# Patient Record
Sex: FEMALE | Race: WHITE | NOT HISPANIC OR LATINO | Employment: FULL TIME | URBAN - METROPOLITAN AREA
[De-identification: names, ages, dates, MRNs, and addresses within clinical notes are randomized per-mention and may not be internally consistent; named-entity substitution may affect disease eponyms.]

---

## 2017-02-21 RX ORDER — GLIPIZIDE 5 MG/1
TABLET, EXTENDED RELEASE ORAL
Qty: 30 TAB | Refills: 5 | Status: SHIPPED | OUTPATIENT
Start: 2017-02-21 | End: 2017-10-30 | Stop reason: SDUPTHER

## 2017-02-21 NOTE — TELEPHONE ENCOUNTER
Was the patient seen in the last year in this department? No 12/15/15    Does patient have an active prescription for medications requested? No     Received Request Via: Pharmacy

## 2017-05-22 RX ORDER — HYDROCORTISONE 20 MG/1
TABLET ORAL
Qty: 60 TAB | Refills: 0 | Status: SHIPPED | OUTPATIENT
Start: 2017-05-22 | End: 2017-07-10 | Stop reason: SDUPTHER

## 2017-05-31 ENCOUNTER — OFFICE VISIT (OUTPATIENT)
Dept: MEDICAL GROUP | Facility: CLINIC | Age: 37
End: 2017-05-31
Payer: COMMERCIAL

## 2017-05-31 ENCOUNTER — HOSPITAL ENCOUNTER (OUTPATIENT)
Dept: LAB | Facility: MEDICAL CENTER | Age: 37
End: 2017-05-31
Attending: INTERNAL MEDICINE
Payer: COMMERCIAL

## 2017-05-31 VITALS
SYSTOLIC BLOOD PRESSURE: 120 MMHG | TEMPERATURE: 96.5 F | BODY MASS INDEX: 25.1 KG/M2 | HEIGHT: 64 IN | HEART RATE: 74 BPM | RESPIRATION RATE: 14 BRPM | OXYGEN SATURATION: 100 % | WEIGHT: 147 LBS | DIASTOLIC BLOOD PRESSURE: 70 MMHG

## 2017-05-31 DIAGNOSIS — E05.00 THYROTOXICOSIS WITH DIFFUSE GOITER AND WITHOUT THYROID STORM: ICD-10-CM

## 2017-05-31 DIAGNOSIS — E27.40 ADRENAL INSUFFICIENCY (HCC): ICD-10-CM

## 2017-05-31 DIAGNOSIS — E11.9 TYPE 2 DIABETES MELLITUS WITHOUT COMPLICATION, WITHOUT LONG-TERM CURRENT USE OF INSULIN (HCC): ICD-10-CM

## 2017-05-31 DIAGNOSIS — L70.0 ACNE VULGARIS: ICD-10-CM

## 2017-05-31 LAB
HBA1C MFR BLD: 6.4 % (ref ?–5.8)
INT CON NEG: NORMAL
INT CON POS: NORMAL
T3FREE SERPL-MCNC: 3.85 PG/ML (ref 2.4–4.2)
T4 FREE SERPL-MCNC: 1.11 NG/DL (ref 0.53–1.43)
TSH SERPL DL<=0.005 MIU/L-ACNC: 0.58 UIU/ML (ref 0.3–3.7)

## 2017-05-31 PROCEDURE — 36415 COLL VENOUS BLD VENIPUNCTURE: CPT

## 2017-05-31 PROCEDURE — 84481 FREE ASSAY (FT-3): CPT

## 2017-05-31 PROCEDURE — 84443 ASSAY THYROID STIM HORMONE: CPT

## 2017-05-31 PROCEDURE — 84439 ASSAY OF FREE THYROXINE: CPT

## 2017-05-31 PROCEDURE — 99214 OFFICE O/P EST MOD 30 MIN: CPT | Performed by: NURSE PRACTITIONER

## 2017-05-31 PROCEDURE — 92250 FUNDUS PHOTOGRAPHY W/I&R: CPT | Mod: TC | Performed by: NURSE PRACTITIONER

## 2017-05-31 PROCEDURE — 83036 HEMOGLOBIN GLYCOSYLATED A1C: CPT | Performed by: NURSE PRACTITIONER

## 2017-05-31 RX ORDER — MINOCYCLINE HYDROCHLORIDE 100 MG/1
100 TABLET ORAL 2 TIMES DAILY
Qty: 60 TAB | Refills: 11 | Status: SHIPPED | OUTPATIENT
Start: 2017-05-31 | End: 2018-06-16 | Stop reason: SDUPTHER

## 2017-05-31 RX ORDER — MINOCYCLINE HYDROCHLORIDE 50 MG/1
50 TABLET ORAL 2 TIMES DAILY
Qty: 60 TAB | Refills: 11 | Status: SHIPPED | OUTPATIENT
Start: 2017-05-31 | End: 2017-05-31

## 2017-05-31 ASSESSMENT — PATIENT HEALTH QUESTIONNAIRE - PHQ9: CLINICAL INTERPRETATION OF PHQ2 SCORE: 0

## 2017-05-31 NOTE — PROGRESS NOTES
CC: Medication Refill        HPI:     Gala presents today for the followin. Acne vulgaris  Here requesting refills of her minocycline for acne. Would like to know she can increase her dose that she does not feel it is helpful as has been previously for acne. She does not get any side effects no GI upset with this.    2. Type 2 diabetes mellitus without complication, without long-term current use of insulin (CMS-HCC)  Related to a history of for adrenal insufficiency and steroid use. Currently is taking glipizide 5 mg once daily at night. She does occasionally check her blood sugar at home and has not had any high levels. No reports of hypoglycemia. Has not had her blood sugar checked in a year    3. Adrenal insufficiency (CMS-HCC)  Followed by endocrinology for this as well as her hyperthyroidism. Has an appointment next week. Steroids daily.    Current Outpatient Prescriptions   Medication Sig Dispense Refill   • minocycline (DYNACIN) 100 MG tablet Take 1 Tab by mouth 2 times a day. 60 Tab 11   • hydrocortisone (CORTEF) 20 MG Tab TAKE ONE TABLET BY MOUTH TWICE A DAY 60 Tab 0   • GLIPIZIDE XL 5 MG TABLET SR 24 HR TAKE ONE TABLET BY MOUTH DAILY 30 Tab 5   • methimazole (TAPAZOLE) 10 MG Tab TAKE ONE AND ONE-HALF (1 & 1/2) TABLET BY MOUTH DAILY 60 Tab 3   • omeprazole (PRILOSEC) 40 MG delayed-release capsule Take 40 mg by mouth 2 times a day.     • sumatriptan (IMITREX) 50 MG Tab TAKE ONE TABLET BY MOUTH ONE TIME AS NEEDED FOR MIGRAINE. MAY REPEAT IN 2 HOURS IF HEADACHE PERSISTS. MAX 2 TABLETS IN 24 HOURS 10 Tab 5   • Ibuprofen (ADVIL) 200 MG CAPS Take 4 Caps by mouth as needed. Indications: Mild to Moderate Pain       No current facility-administered medications for this visit.     Social History   Substance Use Topics   • Smoking status: Former Smoker -- 1.00 packs/day for 13 years     Types: Cigarettes     Quit date: 2006   • Smokeless tobacco: Never Used   • Alcohol Use: No     I reviewed patients  "allergies, problem list and medications today in [facility].    ROS: Any/all pertinent positives listed in the HPI, otherwise all others reviewed are negative today.      /70 mmHg  Pulse 74  Temp(Src) 35.8 °C (96.5 °F)  Resp 14  Ht 1.626 m (5' 4\")  Wt 66.679 kg (147 lb)  BMI 25.22 kg/m2  SpO2 100%    Exam:   Gen: Alert and oriented, No apparent distress. WDWN  Psych: A+Ox3, normal affect and mood  Skin: Warm, dry and intact. Good turgor   No rashes in visible areas.  Eye: Conjunctiva clear, lids normal  ENMT: Lips without lesions, good dentition  Lungs: Clear to auscultation bilaterally, no rales or rhonchi   Unlabored respiratory effort.   CV: Regular rate and rhythm, S1, S2. No murmurs.   No Edema    Monofilament testing with a 10 gram force: sensation intact: intact bilaterally  Visual Inspection: Feet without maceration, ulcers, fissures.  Pedal pulses: intact bilaterally         Point-of-care A1c: 6.4    Assessment and Plan.   37 y.o. female with the following issues.    1. Acne vulgaris  Stable. Increased to 100 mg twice a day. She'll notify mouth she has any adverse effects or stomach upset with this. Recommend probiotic    2. Type 2 diabetes mellitus without complication, without long-term current use of insulin (CMS-HCC)  Stable.  Continue current medication labs ordered as below. Retinal scan today  - POCT  A1C  - COMP METABOLIC PANEL; Future  - LIPID PROFILE; Future  - MICROALBUMIN CREAT RATIO URINE; Future  - Diabetic Monofilament LE Exam  - POCT Retinal Eye Exam    3. Adrenal insufficiency (CMS-[facility])  Stable. Continue follow-up with endocrinology        "

## 2017-05-31 NOTE — MR AVS SNAPSHOT
"        Gala Camarena   2017 11:40 AM   Office Visit   MRN: [id number]    Department:  [facility]   Dept Phone:  [phone]    Description:  Female : 1980   Provider:  SHANNA Wagoner           Reason for Visit     Medication Refill dynacin      Allergies as of 2017     Allergen Noted Reactions    Ativan 2015       Hallucinations, restless    Compazine 2014   Palpitations    Hypertension    Prochlorperazine 2014   Palpitations    \"Compazine\"; severe HTN    Tape 2014   Rash    Paper tape is okay.      You were diagnosed with     Acne vulgaris   [172882]       Type 2 diabetes mellitus without complication, without long-term current use of insulin (CMS-HCC)   [7727133]       Adrenal insufficiency (CMS-HCC)   [471143]         Vital Signs     Blood Pressure Pulse Temperature Respirations Height Weight    120/70 mmHg 74 35.8 °C (96.5 °F) 14 1.626 m (5' 4\") 66.679 kg (147 lb)    Body Mass Index Oxygen Saturation Smoking Status             25.22 kg/m2 100% Former Smoker         Basic Information     Date Of Birth Sex Race Ethnicity Preferred Language    1980 Female White Non- English      Your appointments     2017 11:20 AM   Established Patient with Sebastian Soler M.D.   [facility]    [address] [place] [address], [address]  [facility] [address] [phone]           You will be receiving a confirmation call a few days before your appointment from our automated call confirmation system.              Problem List              ICD-10-CM Priority Class Noted - Resolved    S/P colectomy Z90.49 Low  2014 - Present    Ulcerative colitis (CMS-HCC) K51.90 Medium  2014 - Present    Mixed anxiety and depressive disorder F41.8 Low  2014 - Present    Adrenal insufficiency (CMS-HCC) E27.40 Medium  2014 - Present    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CMS-HCC) C90.00 High  2014 - Present    IgA " gammopathy D47.2 Low  Unknown - Present    Thyrotoxicosis E05.90 Low  11/6/2014 - Present    Headache, classical migraine G43.109 Low  11/1/2014 - Present    Chronic abdominal pain R10.9, G89.29 Low  3/6/2015 - Present    Ulcerative colitis (CMS-HCC) K51.90 Low  5/20/2015 - Present    Type 2 diabetes mellitus E11.9 Medium  12/14/2015 - Present    Acne vulgaris L70.0 Low  12/15/2015 - Present    IUD (intrauterine device) in place-mirena 8/2013 Z97.5 Low  6/15/2016 - Present      Health Maintenance        Date Due Completion Dates    IMM HEP B VACCINE (1 of 3 - Primary Series) 1980 ---    DIABETES MONOFILAMENT / LE EXAM 1980 ---    RETINAL SCREENING 5/25/1997 ---    IMM DTaP/Tdap/Td Vaccine (1 - Tdap) 5/25/1998 ---    IMM PNEUMOCOCCAL 19-64 (ADULT) HIGHEST RISK SERIES (1 of 3 - PCV13) 5/25/1998 ---    A1C SCREENING 8/13/2016 2/13/2016, 4/27/2015, 12/8/2014, 9/19/2014    FASTING LIPID PROFILE 2/13/2017 2/13/2016, 12/8/2014    URINE ACR / MICROALBUMIN 2/13/2017 2/13/2016    SERUM CREATININE 2/13/2017 2/13/2016, 5/30/2015, 5/29/2015, 5/27/2015, 5/24/2015, 5/23/2015, 5/22/2015, 5/21/2015, 5/8/2015, 4/27/2015, 4/27/2015, 12/29/2014, 12/8/2014, 11/26/2014, 10/29/2014, 10/2/2014, 9/19/2014, 9/4/2014, 9/1/2014, 8/30/2014, 8/29/2014, 8/28/2014, 8/27/2014, 8/26/2014, 8/25/2014, 8/24/2014, 8/23/2014, 8/22/2014, 8/21/2014, 8/21/2014, 8/21/2014, 8/18/2014, 8/13/2014, 8/12/2014, 8/11/2014, 7/21/2014, 6/23/2014, 6/23/2014, 6/22/2014, 6/21/2014, 6/21/2014, 6/20/2014, 6/19/2014, 6/19/2014, 6/18/2014, 6/17/2014, 6/17/2014, 6/17/2014, 6/15/2014, 6/14/2014, 6/13/2014, 6/11/2014, 6/10/2014, 6/8/2014, 6/7/2014, 6/6/2014, 6/5/2014, 6/4/2014, 6/3/2014, 5/31/2014, 5/29/2014, 5/28/2014, 5/26/2014, 5/22/2014, 5/20/2014, 5/19/2014, 5/18/2014, 5/17/2014, 5/16/2014, 5/16/2014, 5/15/2014, 5/14/2014, 5/13/2014, 5/12/2014, 5/10/2014, 5/9/2014, 5/2/2014, 4/28/2014, 4/12/2014, 4/11/2014, 4/10/2014, 4/9/2014, 4/8/2014, 4/7/2014, 4/6/2014,  4/5/2014, 4/4/2014, 3/31/2014, 3/30/2014, 3/29/2014, 3/27/2014, 3/26/2014, 3/25/2014, 3/24/2014, 3/23/2014, 3/22/2014, 3/21/2014, 3/20/2014, 3/19/2014, 3/18/2014, 3/17/2014, 3/16/2014, 3/15/2014, 3/14/2014, 3/13/2014, 3/12/2014, 3/11/2014, 3/10/2014, 3/9/2014, 3/8/2014, 3/7/2014, 3/6/2014, 3/6/2014, 3/5/2014, 3/3/2014, 3/2/2014, 2/27/2014, 2/26/2014, 2/25/2014, 2/22/2014, 2/21/2014, 2/20/2014, 2/15/2014    PAP SMEAR 5/14/2017 5/14/2014 (Done)    Override on 5/14/2014: Done            Results     POCT  A1C      Component    Glycohemoglobin    6.4    Internal Control Negative    Internal Control Positive                        Current Immunizations     Influenza TIV (IM) 2/24/2014    Influenza Vaccine Quad Inj (Preserved) 12/19/2014    Tuberculin Skin Test  Incomplete      Below and/or attached are the medications your provider expects you to take. Review all of your home medications and newly ordered medications with your provider and/or pharmacist. Follow medication instructions as directed by your provider and/or pharmacist. Please keep your medication list with you and share with your provider. Update the information when medications are discontinued, doses are changed, or new medications (including over-the-counter products) are added; and carry medication information at all times in the event of emergency situations     Allergies:  ATIVAN - (reactions not documented)     COMPAZINE - Palpitations     PROCHLORPERAZINE - Palpitations     TAPE - Rash               Medications  Valid as of: May 31, 2017 - 12:09 PM    Generic Name Brand Name Tablet Size Instructions for use    GlipiZIDE (TABLET SR 24 HR) GLIPIZIDE XL 5 MG TAKE ONE TABLET BY MOUTH DAILY        Hydrocortisone (Tab) CORTEF 20 MG TAKE ONE TABLET BY MOUTH TWICE A DAY        Ibuprofen (Cap) Ibuprofen 200 MG Take 4 Caps by mouth as needed. Indications: Mild to Moderate Pain        MethIMAzole (Tab) TAPAZOLE 10 MG TAKE ONE AND ONE-HALF (1 & 1/2) TABLET BY  MOUTH DAILY        Minocycline HCl (Tab) DYNACIN 100 MG Take 1 Tab by mouth 2 times a day.        Omeprazole (CAPSULE DELAYED RELEASE) PRILOSEC 40 MG Take 40 mg by mouth 2 times a day.        SUMAtriptan Succinate (Tab) IMITREX 50 MG TAKE ONE TABLET BY MOUTH ONE TIME AS NEEDED FOR MIGRAINE. MAY REPEAT IN 2 HOURS IF HEADACHE PERSISTS. MAX 2 TABLETS IN 24 HOURS        .                 Medicines prescribed today were sent to:     [facility] #[id number] - [address] AT [address] [address]    Phone: [phone] Fax: [phone]    Open 24 Hours?: No      Medication refill instructions:       If your prescription bottle indicates you have medication refills left, it is not necessary to call your provider’s office. Please contact your pharmacy and they will refill your medication.    If your prescription bottle indicates you do not have any refills left, you may request refills at any time through one of the following ways: The online iCrumz system (except Urgent Care), by calling your provider’s office, or by asking your pharmacy to contact your provider’s office with a refill request. Medication refills are processed only during regular business hours and may not be available until the next business day. Your provider may request additional information or to have a follow-up visit with you prior to refilling your medication.   *Please Note: Medication refills are assigned a new Rx number when refilled electronically. Your pharmacy may indicate that no refills were authorized even though a new prescription for the same medication is available at the pharmacy. Please request the medicine by name with the pharmacy before contacting your provider for a refill.        Your To Do List     Future Labs/Procedures Complete By Expires    COMP METABOLIC PANEL  As directed 6/1/2018    LIPID PROFILE  As directed 6/1/2018    MICROALBUMIN CREAT RATIO URINE  As directed 6/1/2018          [employer] Access Code: Activation code not generated  Current [employer] Status: Active

## 2017-06-06 ENCOUNTER — OFFICE VISIT (OUTPATIENT)
Dept: ENDOCRINOLOGY | Facility: MEDICAL CENTER | Age: 37
End: 2017-06-06
Payer: COMMERCIAL

## 2017-06-06 VITALS
HEART RATE: 88 BPM | DIASTOLIC BLOOD PRESSURE: 66 MMHG | BODY MASS INDEX: 24.41 KG/M2 | SYSTOLIC BLOOD PRESSURE: 104 MMHG | WEIGHT: 143 LBS | HEIGHT: 64 IN | OXYGEN SATURATION: 99 %

## 2017-06-06 DIAGNOSIS — E05.80 OTHER THYROTOXICOSIS: Primary | ICD-10-CM

## 2017-06-06 DIAGNOSIS — E27.40 ADRENAL INSUFFICIENCY (HCC): ICD-10-CM

## 2017-06-06 PROCEDURE — 99214 OFFICE O/P EST MOD 30 MIN: CPT | Performed by: INTERNAL MEDICINE

## 2017-06-06 NOTE — MR AVS SNAPSHOT
"        Gala Camarena   2017 11:20 AM   Office Visit   MRN: [id number]    Department:  Endocrinology [facility]   Dept Phone:  [phone]    Description:  Female : 1980   Provider:  Sebastian Soler M.D.           Allergies as of 2017     Allergen Noted Reactions    Ativan 2015       Hallucinations, restless    Compazine 2014   Palpitations    Hypertension    Prochlorperazine 2014   Palpitations    \"Compazine\"; severe HTN    Tape 2014   Rash    Paper tape is okay.      You were diagnosed with     Other thyrotoxicosis   [0928914]  -  Primary     Adrenal insufficiency (CMS-HCC)   [375667]         Vital Signs     Blood Pressure Pulse Height Weight Body Mass Index Oxygen Saturation    104/66 mmHg 88 1.626 m (5' 4.02\") 64.864 kg (143 lb) 24.53 kg/m2 99%    Smoking Status                   Former Smoker           Basic Information     Date Of Birth Sex Race Ethnicity Preferred Language    1980 Female White Non- English      Your appointments     2017 10:40 AM   Established Patient with Sebastian Soler M.D.   [facility]    [address] [facility], [address]  [facility] [address] [phone]           You will be receiving a confirmation call a few days before your appointment from our automated call confirmation system.              Problem List              ICD-10-CM Priority Class Noted - Resolved    S/P colectomy Z90.49 Low  2014 - Present    Ulcerative colitis (CMS-[facility]) K51.90 Medium  2014 - Present    Mixed anxiety and depressive disorder F41.8 Low  2014 - Present    Adrenal insufficiency (CMS-[facility]) E27.40 Medium  2014 - Present    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CMS-[facility]) C90.00 High  2014 - Present    IgA gammopathy D47.2 Low  Unknown - Present    Thyrotoxicosis E05.90 Low  2014 - Present    Headache, classical migraine G43.109 Low  2014 - Present    Chronic abdominal pain R10.9, G89.29 " Low  3/6/2015 - Present    Ulcerative colitis (CMS-HCC) K51.90 Low  5/20/2015 - Present    Type 2 diabetes mellitus E11.9 Medium  12/14/2015 - Present    Acne vulgaris L70.0 Low  12/15/2015 - Present    IUD (intrauterine device) in place-mirena 8/2013 Z97.5 Low  6/15/2016 - Present      Health Maintenance        Date Due Completion Dates    IMM HEP B VACCINE (1 of 3 - Primary Series) 1980 ---    IMM DTaP/Tdap/Td Vaccine (1 - Tdap) 5/25/1998 ---    IMM PNEUMOCOCCAL 19-64 (ADULT) HIGHEST RISK SERIES (1 of 3 - PCV13) 5/25/1998 ---    FASTING LIPID PROFILE 2/13/2017 2/13/2016, 12/8/2014    URINE ACR / MICROALBUMIN 2/13/2017 2/13/2016    SERUM CREATININE 2/13/2017 2/13/2016, 5/30/2015, 5/29/2015, 5/27/2015, 5/24/2015, 5/23/2015, 5/22/2015, 5/21/2015, 5/8/2015, 4/27/2015, 4/27/2015, 12/29/2014, 12/8/2014, 11/26/2014, 10/29/2014, 10/2/2014, 9/19/2014, 9/4/2014, 9/1/2014, 8/30/2014, 8/29/2014, 8/28/2014, 8/27/2014, 8/26/2014, 8/25/2014, 8/24/2014, 8/23/2014, 8/22/2014, 8/21/2014, 8/21/2014, 8/21/2014, 8/18/2014, 8/13/2014, 8/12/2014, 8/11/2014, 7/21/2014, 6/23/2014, 6/23/2014, 6/22/2014, 6/21/2014, 6/21/2014, 6/20/2014, 6/19/2014, 6/19/2014, 6/18/2014, 6/17/2014, 6/17/2014, 6/17/2014, 6/15/2014, 6/14/2014, 6/13/2014, 6/11/2014, 6/10/2014, 6/8/2014, 6/7/2014, 6/6/2014, 6/5/2014, 6/4/2014, 6/3/2014, 5/31/2014, 5/29/2014, 5/28/2014, 5/26/2014, 5/22/2014, 5/20/2014, 5/19/2014, 5/18/2014, 5/17/2014, 5/16/2014, 5/16/2014, 5/15/2014, 5/14/2014, 5/13/2014, 5/12/2014, 5/10/2014, 5/9/2014, 5/2/2014, 4/28/2014, 4/12/2014, 4/11/2014, 4/10/2014, 4/9/2014, 4/8/2014, 4/7/2014, 4/6/2014, 4/5/2014, 4/4/2014, 3/31/2014, 3/30/2014, 3/29/2014, 3/27/2014, 3/26/2014, 3/25/2014, 3/24/2014, 3/23/2014, 3/22/2014, 3/21/2014, 3/20/2014, 3/19/2014, 3/18/2014, 3/17/2014, 3/16/2014, 3/15/2014, 3/14/2014, 3/13/2014, 3/12/2014, 3/11/2014, 3/10/2014, 3/9/2014, 3/8/2014, 3/7/2014, 3/6/2014, 3/6/2014, 3/5/2014, 3/3/2014, 3/2/2014, 2/27/2014, 2/26/2014,  2/25/2014, 2/22/2014, 2/21/2014, 2/20/2014, 2/15/2014    PAP SMEAR 5/14/2017 5/14/2014 (Done)    Override on 5/14/2014: Done    A1C SCREENING 11/30/2017 5/31/2017, 2/13/2016, 4/27/2015, 12/8/2014, 9/19/2014    RETINAL SCREENING 5/31/2018 5/31/2017    DIABETES MONOFILAMENT / LE EXAM 5/31/2018 5/31/2017            Current Immunizations     Influenza TIV (IM) 2/24/2014    Influenza Vaccine Quad Inj (Preserved) 12/19/2014    Tuberculin Skin Test  Incomplete      Below and/or attached are the medications your provider expects you to take. Review all of your home medications and newly ordered medications with your provider and/or pharmacist. Follow medication instructions as directed by your provider and/or pharmacist. Please keep your medication list with you and share with your provider. Update the information when medications are discontinued, doses are changed, or new medications (including over-the-counter products) are added; and carry medication information at all times in the event of emergency situations     Allergies:  ATIVAN - (reactions not documented)     COMPAZINE - Palpitations     PROCHLORPERAZINE - Palpitations     TAPE - Rash               Medications  Valid as of: June 06, 2017 - 12:00 PM    Generic Name Brand Name Tablet Size Instructions for use    GlipiZIDE (TABLET SR 24 HR) GLIPIZIDE XL 5 MG TAKE ONE TABLET BY MOUTH DAILY        Hydrocortisone (Tab) CORTEF 20 MG TAKE ONE TABLET BY MOUTH TWICE A DAY        Ibuprofen (Cap) Ibuprofen 200 MG Take 4 Caps by mouth as needed. Indications: Mild to Moderate Pain        MethIMAzole (Tab) TAPAZOLE 10 MG TAKE ONE AND ONE-HALF (1 & 1/2) TABLET BY MOUTH DAILY        Minocycline HCl (Tab) DYNACIN 100 MG Take 1 Tab by mouth 2 times a day.        Omeprazole (CAPSULE DELAYED RELEASE) PRILOSEC 40 MG Take 40 mg by mouth 2 times a day.        SUMAtriptan Succinate (Tab) IMITREX 50 MG TAKE ONE TABLET BY MOUTH ONE TIME AS NEEDED FOR MIGRAINE. MAY REPEAT IN 2 HOURS IF  HEADACHE PERSISTS. MAX 2 TABLETS IN 24 HOURS        .                 Medicines prescribed today were sent to:     [facility] #[id number] - [place], [place] - [address] [facility] AT [address] [place] [address]    Phone: [phone] Fax: [phone]    Open 24 Hours?: No      Medication refill instructions:       If your prescription bottle indicates you have medication refills left, it is not necessary to call your provider’s office. Please contact your pharmacy and they will refill your medication.    If your prescription bottle indicates you do not have any refills left, you may request refills at any time through one of the following ways: The online [employer] system (except Urgent Care), by calling your provider’s office, or by asking your pharmacy to contact your provider’s office with a refill request. Medication refills are processed only during regular business hours and may not be available until the next business day. Your provider may request additional information or to have a follow-up visit with you prior to refilling your medication.   *Please Note: Medication refills are assigned a new Rx number when refilled electronically. Your pharmacy may indicate that no refills were authorized even though a new prescription for the same medication is available at the pharmacy. Please request the medicine by name with the pharmacy before contacting your provider for a refill.        Your To Do List     Future Labs/Procedures Complete By Expires    ACTH  As directed 12/7/2017    BASIC METABOLIC PANEL  As directed 6/6/2018    CORTISOL  As directed 12/7/2017    FREE THYROXINE  As directed 12/7/2017    T3 FREE  As directed 12/7/2017    THYROID PEROXIDASE  (TPO) AB  As directed 12/7/2017    TSH  As directed 12/7/2017    US-SOFT TISSUES OF HEAD - NECK  As directed 12/5/2017         Peerform Access Code: Activation code not generated  Current Peerform Status: Active           33.5 51

## 2017-06-07 NOTE — PROGRESS NOTES
"Chief Complaint   Patient presents with   • Thyrotoxicosis        HPI:        1. Thyrotoxicosis.    The patient is doing very well and is clinically euthyroid taking methimazole 10 mg per day.  Her TSH is normal at 0.5 and free T4 is mid range at 1.1 and free T3 upper normal at 3.8.  She does not have the Grave’s antibody.  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  I am going to do an ultrasound to define the anatomy.  Also we will get TPO antibodies to see if she might have Charley toxicosis.  I think it is too soon to change her dose of methimazole.  She will stay on 10 mg and recheck in one month.    2. Adrenal insufficiency.    This is a diagnosis that came out of the hospital without much of a work up.  However she was quite ill and did well taking hydrocortisone.  We haven’t wanted to take her off too quickly.  The 21-hydroxylase antibody is negative.  In the meantime, I would like to lower her dose a little bit to see if we can gradually wean off or tell us that we shouldn’t so she will go down to 10 mg AM and 5 mg PM.  She does have a Medic Alert bracelet.  She does know that she is to increase her cortisone double or triple at the time of illness or injury.      I will review that also again in one month.  We will get a morning ACTH and cortisol level before she takes her morning dose of cortisone.      ROS:  All other systems reported as negative or unchanged since last exam      Allergies:   Allergies   Allergen Reactions   • Ativan      Hallucinations, restless   • Compazine Palpitations     Hypertension   • Prochlorperazine Palpitations     \"Compazine\"; severe HTN   • Tape Rash     Paper tape is okay.       Current medicines including changes today:  Current Outpatient Prescriptions   Medication Sig Dispense Refill   • minocycline (DYNACIN) 100 MG tablet Take 1 Tab by mouth 2 times a day. 60 Tab 11   • hydrocortisone (CORTEF) 20 MG Tab TAKE ONE TABLET BY " "MOUTH TWICE A DAY 60 Tab 0   • GLIPIZIDE XL 5 MG TABLET SR 24 HR TAKE ONE TABLET BY MOUTH DAILY 30 Tab 5   • methimazole (TAPAZOLE) 10 MG Tab TAKE ONE AND ONE-HALF (1 & 1/2) TABLET BY MOUTH DAILY 60 Tab 3   • omeprazole (PRILOSEC) 40 MG delayed-release capsule Take 40 mg by mouth 2 times a day.     • sumatriptan (IMITREX) 50 MG Tab TAKE ONE TABLET BY MOUTH ONE TIME AS NEEDED FOR MIGRAINE. MAY REPEAT IN 2 HOURS IF HEADACHE PERSISTS. MAX 2 TABLETS IN 24 HOURS 10 Tab 5   • Ibuprofen (ADVIL) 200 MG CAPS Take 4 Caps by mouth as needed. Indications: Mild to Moderate Pain       No current facility-administered medications for this visit.        Past Medical History   Diagnosis Date   • Ulcerative colitis, chronic (CMS-HCC)    • Ovarian cyst    • Opioid dependence (CMS-HCC) 2014     hx of heavy use of dilaudid   • Mixed anxiety and depressive disorder 2014   • Chronic erosive gastritis 8/14     Dr Mercedes   • Hypercalcemia 2014   • Pancreatitis 2014   • Blood transfusion, without reported diagnosis 8/2013   • Adrenal insufficiency (CMS-HCC) 2014     21-hydroxylase antibody = neg   • S/P total colectomy 2014   • IgA gammopathy 2014   • Breath shortness    • Ileostomy in place (CMS-HCC)    • Pain 05-08-15     abd., rectum, 3/10   • Headache, classical migraine 11/2014     since 8yoa, triggers=unk, imitrex works well.  freq=3-4x/month.     • Thyrotoxicosis 2015     hyper, on methimazole, sees endo   • Type II or unspecified type diabetes mellitus without mention of complication, not stated as uncontrolled 2/2014     NIDDM, sees endo.  no GDM.  takes glipizide   • Kidney disease      hx of pyelo, outpat tx   • GERD (gastroesophageal reflux disease)      omeprazole   • Anxiety      in remission   • Depression      in remission       PHYSICAL EXAM:    /66 mmHg  Pulse 88  Ht 1.626 m (5' 4.02\")  Wt 64.864 kg (143 lb)  BMI 24.53 kg/m2  SpO2 99%    Gen.   appears healthy     Skin   appropriate for sex and age    HEENT  " unremarkable    Neck   thyroid gland is sizable he enlarged and a bit asymmetrical. Left lobe larger than right    Heart  regular    Extremities  no edema    Neuro  gait and station normal, no tremor    Psych  appropriate, calm, pleasant      ASSESSMENT AND RECOMMENDATIONS    1.  Thyrotoxicosis, ? Etiology    - FREE THYROXINE; Future  - T3 FREE; Future  - TSH; Future  - THYROID PEROXIDASE  (TPO) AB; Future  - US-SOFT TISSUES OF HEAD - NECK; Future    2. Adrenal insufficiency (CMS-HCC)                This diagnosis is made in the hospital when she was acutely ill and responded dramatically to hydrocortisone replacement                21-hydroxylase antibody negative  - CORTISOL; Future  - ACTH; Future  - BASIC METABOLIC PANEL; Future      DISPOSITION: Return in about 1 month (around 7/6/2017).       Sebastian Soler M.D.    Copies to: EMILE WagonerRTruN. [phone]

## 2017-06-26 ENCOUNTER — HOSPITAL ENCOUNTER (OUTPATIENT)
Dept: LAB | Facility: MEDICAL CENTER | Age: 37
End: 2017-06-26
Attending: INTERNAL MEDICINE
Payer: COMMERCIAL

## 2017-06-26 DIAGNOSIS — E05.80 OTHER THYROTOXICOSIS: ICD-10-CM

## 2017-06-26 DIAGNOSIS — E27.40 ADRENAL INSUFFICIENCY (HCC): ICD-10-CM

## 2017-06-26 LAB
ANION GAP SERPL CALC-SCNC: 3 MMOL/L (ref 0–11.9)
BUN SERPL-MCNC: 18 MG/DL (ref 8–22)
CALCIUM SERPL-MCNC: 8.8 MG/DL (ref 8.5–10.5)
CHLORIDE SERPL-SCNC: 111 MMOL/L (ref 96–112)
CO2 SERPL-SCNC: 24 MMOL/L (ref 20–33)
CORTIS SERPL-MCNC: 7.8 UG/DL (ref 0–23)
CREAT SERPL-MCNC: 0.93 MG/DL (ref 0.5–1.4)
GFR SERPL CREATININE-BSD FRML MDRD: >60 ML/MIN/1.73 M 2
GLUCOSE SERPL-MCNC: 106 MG/DL (ref 65–99)
POTASSIUM SERPL-SCNC: 4.8 MMOL/L (ref 3.6–5.5)
SODIUM SERPL-SCNC: 138 MMOL/L (ref 135–145)
T3FREE SERPL-MCNC: 3.86 PG/ML (ref 2.4–4.2)
T4 FREE SERPL-MCNC: 0.9 NG/DL (ref 0.53–1.43)
THYROPEROXIDASE AB SERPL-ACNC: 0.8 IU/ML (ref 0–9)
TSH SERPL DL<=0.005 MIU/L-ACNC: 0.22 UIU/ML (ref 0.3–3.7)

## 2017-06-26 PROCEDURE — 82024 ASSAY OF ACTH: CPT

## 2017-06-26 PROCEDURE — 86376 MICROSOMAL ANTIBODY EACH: CPT

## 2017-06-26 PROCEDURE — 84443 ASSAY THYROID STIM HORMONE: CPT

## 2017-06-26 PROCEDURE — 36415 COLL VENOUS BLD VENIPUNCTURE: CPT

## 2017-06-26 PROCEDURE — 84439 ASSAY OF FREE THYROXINE: CPT

## 2017-06-26 PROCEDURE — 80048 BASIC METABOLIC PNL TOTAL CA: CPT

## 2017-06-26 PROCEDURE — 82533 TOTAL CORTISOL: CPT

## 2017-06-26 PROCEDURE — 84481 FREE ASSAY (FT-3): CPT

## 2017-06-27 LAB — ACTH PLAS-MCNC: 6 PG/ML (ref 6–58)

## 2017-06-28 ENCOUNTER — OFFICE VISIT (OUTPATIENT)
Dept: ENDOCRINOLOGY | Facility: MEDICAL CENTER | Age: 37
End: 2017-06-28
Payer: COMMERCIAL

## 2017-06-28 VITALS
SYSTOLIC BLOOD PRESSURE: 120 MMHG | HEIGHT: 63 IN | OXYGEN SATURATION: 98 % | WEIGHT: 143.8 LBS | HEART RATE: 87 BPM | DIASTOLIC BLOOD PRESSURE: 72 MMHG | BODY MASS INDEX: 25.48 KG/M2

## 2017-06-28 DIAGNOSIS — E27.40 ADRENAL INSUFFICIENCY (HCC): ICD-10-CM

## 2017-06-28 DIAGNOSIS — E05.80 OTHER THYROTOXICOSIS: Primary | ICD-10-CM

## 2017-06-28 PROCEDURE — 99214 OFFICE O/P EST MOD 30 MIN: CPT | Performed by: INTERNAL MEDICINE

## 2017-06-28 NOTE — PROGRESS NOTES
"Chief Complaint   Patient presents with   • Thyrotoxicosis        HPI:     1.  Thyrotoxicosis, ? Type             Patient is clinically euthyroid taking methimazole 10 mg once a day. TSH is still slightly suppressed at 0.2. Free T4 is low normal at 0.9 and free T3 is upper normal at 3.8. Her gland is somewhat bosselated and I'm suspicious she has nodules. I have ordered a thyroid ultrasound twice but not done. I asked her again to please get it done so I can make some treatment decisions. She indicated she would do that soon.    2.  Adrenal insufficiency            This is an empiric diagnosis made when she was acutely ill in the hospital. Given steroids and improved remarkably. Currently she is on low-dose hydrocortisone i.e. 10 mg a.m. and 5 mg p.m. She is feeling very well.          A morning cortisol before her hydrocortisone dose was 7.8 and ACTH 6. Not very convincing numbers. At some point I want to see if we can withdraw her off of hydrocortisone and test to prove that she does or doesn't have adrenal insufficiency. I do not want to do that while she is thyrotoxic. That could precipitate an adrenal crisis. I will try to settle the thyroid issue definitively before lower her hydrocortisone dose any further.    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      Allergies:   Allergies   Allergen Reactions   • Ativan      Hallucinations, restless   • Compazine Palpitations     Hypertension   • Prochlorperazine Palpitations     \"Compazine\"; severe HTN   • Tape Rash     Paper tape is okay.       Current medicines including changes today:  Current Outpatient Prescriptions   Medication Sig Dispense Refill   • minocycline (DYNACIN) 100 MG tablet Take 1 Tab by mouth 2 times a day. 60 Tab 11   • hydrocortisone (CORTEF) 20 MG Tab TAKE ONE TABLET BY MOUTH TWICE A DAY (Patient taking differently: TAKE ONE TABLET BY MOUTH am " "and 1/2 tab at night) 60 Tab 0   • GLIPIZIDE XL 5 MG TABLET SR 24 HR TAKE ONE TABLET BY MOUTH DAILY 30 Tab 5   • methimazole (TAPAZOLE) 10 MG Tab TAKE ONE AND ONE-HALF (1 & 1/2) TABLET BY MOUTH DAILY (Patient taking differently: TAKE ONE AND ONE-HALF (1 ) TABLET BY MOUTH DAILY) 60 Tab 3   • omeprazole (PRILOSEC) 40 MG delayed-release capsule Take 40 mg by mouth 2 times a day.     • sumatriptan (IMITREX) 50 MG Tab TAKE ONE TABLET BY MOUTH ONE TIME AS NEEDED FOR MIGRAINE. MAY REPEAT IN 2 HOURS IF HEADACHE PERSISTS. MAX 2 TABLETS IN 24 HOURS 10 Tab 5   • Ibuprofen (ADVIL) 200 MG CAPS Take 4 Caps by mouth as needed. Indications: Mild to Moderate Pain       No current facility-administered medications for this visit.        Past Medical History   Diagnosis Date   • Ulcerative colitis, chronic (CMS-HCC)    • Ovarian cyst    • Opioid dependence (CMS-HCC) 2014     hx of heavy use of dilaudid   • Mixed anxiety and depressive disorder 2014   • Chronic erosive gastritis 8/14     Dr Mercedes   • Hypercalcemia 2014   • Pancreatitis 2014   • Blood transfusion, without reported diagnosis 8/2013   • Adrenal insufficiency (CMS-HCC) 2014     21-hydroxylase antibody = neg   • S/P total colectomy 2014   • IgA gammopathy 2014   • Breath shortness    • Ileostomy in place (CMS-HCC)    • Pain 05-08-15     abd., rectum, 3/10   • Headache, classical migraine 11/2014     since 8yoa, triggers=unk, imitrex works well.  freq=3-4x/month.     • Thyrotoxicosis 2015     hyper, on methimazole, sees endo   • Type II or unspecified type diabetes mellitus without mention of complication, not stated as uncontrolled 2/2014     NIDDM, sees endo.  no GDM.  takes glipizide   • Kidney disease      hx of pyelo, outpat tx   • GERD (gastroesophageal reflux disease)      omeprazole   • Anxiety      in remission   • Depression      in remission       PHYSICAL EXAM:    /72 mmHg  Pulse 87  Ht 1.6 m (5' 3\")  Wt 65.227 kg (143 lb 12.8 oz)  BMI 25.48 kg/m2  " SpO2 98%    Gen.   appears healthy     Skin   appropriate for sex and age    HEENT  no eye signs of Graves' disease    Neck   thyroid gland is enlarged at least 3 times normal size with a bosselated surface. Nontender    Heart  regular    Extremities  no edema    Neuro  gait and station normal    Psych  appropriate      ASSESSMENT AND RECOMMENDATIONS    1.  Thyrotoxicosis, ? Type             Continue methimazole 10 mg per day  - FREE THYROXINE; Future  - T3 FREE; Future  - TSH; Future  - US-SOFT TISSUES OF HEAD - NECK;     2. Adrenal insufficiency (CMS-HCC)           Continue low-dose hydrocortisone supplements. 10 mg a.m. 5 mg p.m.      DISPOSITION: Return in about 1 month (around 7/28/2017).       Sebastian Soler M.D.    Copies to: ROWDY WagonerP.R.N. [phone]

## 2017-06-28 NOTE — MR AVS SNAPSHOT
"        Gala Camarena   2017 10:40 AM   Office Visit   MRN: [id number]    Department:  Endocrinology [facility]   Dept Phone:  [phone]    Description:  Female : 1980   Provider:  Sebastian Soler M.D.           Allergies as of 2017     Allergen Noted Reactions    Ativan 2015       Hallucinations, restless    Compazine 2014   Palpitations    Hypertension    Prochlorperazine 2014   Palpitations    \"Compazine\"; severe HTN    Tape 2014   Rash    Paper tape is okay.      You were diagnosed with     Other thyrotoxicosis   [0929147]  -  Primary     Adrenal insufficiency (CMS-HCC)   [208260]         Vital Signs     Blood Pressure Pulse Height Weight Body Mass Index Oxygen Saturation    120/72 mmHg 87 1.6 m (5' 3\") 65.227 kg (143 lb 12.8 oz) 25.48 kg/m2 98%    Smoking Status                   Former Smoker           Basic Information     Date Of Birth Sex Race Ethnicity Preferred Language    1980 Female White Non- English      Your appointments     2017 11:00 AM   Established Patient with Sebastian Soler M.D.   [facility]    [address], [address]  [facility] [facility] [place] [address] [phone]           You will be receiving a confirmation call a few days before your appointment from our automated call confirmation system.              Problem List              ICD-10-CM Priority Class Noted - Resolved    S/P colectomy Z90.49 Low  2014 - Present    Ulcerative colitis (CMS-[facility]) K51.90 Medium  2014 - Present    Mixed anxiety and depressive disorder F41.8 Low  2014 - Present    Adrenal insufficiency (CMS-[facility]) E27.40 Medium  2014 - Present    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CMS-[facility]) C90.00 High  2014 - Present    IgA gammopathy D47.2 Low  Unknown - Present    Thyrotoxicosis E05.90 Low  2014 - Present    Headache, classical migraine G43.109 Low  2014 - Present    Chronic abdominal pain R10.9, " G89.29 Low  3/6/2015 - Present    Ulcerative colitis (CMS-HCC) K51.90 Low  5/20/2015 - Present    Type 2 diabetes mellitus E11.9 Medium  12/14/2015 - Present    Acne vulgaris L70.0 Low  12/15/2015 - Present    IUD (intrauterine device) in place-mirena 8/2013 Z97.5 Low  6/15/2016 - Present      Health Maintenance        Date Due Completion Dates    IMM HEP B VACCINE (1 of 3 - Primary Series) 1980 ---    IMM DTaP/Tdap/Td Vaccine (1 - Tdap) 5/25/1998 ---    IMM PNEUMOCOCCAL 19-64 (ADULT) HIGHEST RISK SERIES (1 of 3 - PCV13) 5/25/1998 ---    FASTING LIPID PROFILE 2/13/2017 2/13/2016, 12/8/2014    URINE ACR / MICROALBUMIN 2/13/2017 2/13/2016    PAP SMEAR 5/14/2017 5/14/2014 (Done)    Override on 5/14/2014: Done    A1C SCREENING 11/30/2017 5/31/2017, 2/13/2016, 4/27/2015, 12/8/2014, 9/19/2014    RETINAL SCREENING 5/31/2018 5/31/2017    DIABETES MONOFILAMENT / LE EXAM 5/31/2018 5/31/2017    SERUM CREATININE 6/26/2018 6/26/2017, 2/13/2016, 5/30/2015, 5/29/2015, 5/27/2015, 5/24/2015, 5/23/2015, 5/22/2015, 5/21/2015, 5/8/2015, 4/27/2015, 4/27/2015, 12/29/2014, 12/8/2014, 11/26/2014, 10/29/2014, 10/2/2014, 9/19/2014, 9/4/2014, 9/1/2014, 8/30/2014, 8/29/2014, 8/28/2014, 8/27/2014, 8/26/2014, 8/25/2014, 8/24/2014, 8/23/2014, 8/22/2014, 8/21/2014, 8/21/2014, 8/21/2014, 8/18/2014, 8/13/2014, 8/12/2014, 8/11/2014, 7/21/2014, 6/23/2014, 6/23/2014, 6/22/2014, 6/21/2014, 6/21/2014, 6/20/2014, 6/19/2014, 6/19/2014, 6/18/2014, 6/17/2014, 6/17/2014, 6/17/2014, 6/15/2014, 6/14/2014, 6/13/2014, 6/11/2014, 6/10/2014, 6/8/2014, 6/7/2014, 6/6/2014, 6/5/2014, 6/4/2014, 6/3/2014, 5/31/2014, 5/29/2014, 5/28/2014, 5/26/2014, 5/22/2014, 5/20/2014, 5/19/2014, 5/18/2014, 5/17/2014, 5/16/2014, 5/16/2014, 5/15/2014, 5/14/2014, 5/13/2014, 5/12/2014, 5/10/2014, 5/9/2014, 5/2/2014, 4/28/2014, 4/12/2014, 4/11/2014, 4/10/2014, 4/9/2014, 4/8/2014, 4/7/2014, 4/6/2014, 4/5/2014, 4/4/2014, 3/31/2014, 3/30/2014, 3/29/2014, 3/27/2014, 3/26/2014, 3/25/2014,  3/24/2014, 3/23/2014, 3/22/2014, 3/21/2014, 3/20/2014, 3/19/2014, 3/18/2014, 3/17/2014, 3/16/2014, 3/15/2014, 3/14/2014, 3/13/2014, 3/12/2014, 3/11/2014, 3/10/2014, 3/9/2014, 3/8/2014, 3/7/2014, 3/6/2014, 3/6/2014, 3/5/2014, 3/3/2014, 3/2/2014, 2/27/2014, 2/26/2014, 2/25/2014, 2/22/2014, 2/21/2014, 2/20/2014, 2/15/2014            Current Immunizations     Influenza TIV (IM) 2/24/2014    Influenza Vaccine Quad Inj (Preserved) 12/19/2014    Tuberculin Skin Test  Incomplete      Below and/or attached are the medications your provider expects you to take. Review all of your home medications and newly ordered medications with your provider and/or pharmacist. Follow medication instructions as directed by your provider and/or pharmacist. Please keep your medication list with you and share with your provider. Update the information when medications are discontinued, doses are changed, or new medications (including over-the-counter products) are added; and carry medication information at all times in the event of emergency situations     Allergies:  ATIVAN - (reactions not documented)     COMPAZINE - Palpitations     PROCHLORPERAZINE - Palpitations     TAPE - Rash               Medications  Valid as of: June 28, 2017 - 11:06 AM    Generic Name Brand Name Tablet Size Instructions for use    GlipiZIDE (TABLET SR 24 HR) GLIPIZIDE XL 5 MG TAKE ONE TABLET BY MOUTH DAILY        Hydrocortisone (Tab) CORTEF 20 MG TAKE ONE TABLET BY MOUTH TWICE A DAY        Ibuprofen (Cap) Ibuprofen 200 MG Take 4 Caps by mouth as needed. Indications: Mild to Moderate Pain        MethIMAzole (Tab) TAPAZOLE 10 MG TAKE ONE AND ONE-HALF (1 & 1/2) TABLET BY MOUTH DAILY        Minocycline HCl (Tab) DYNACIN 100 MG Take 1 Tab by mouth 2 times a day.        Omeprazole (CAPSULE DELAYED RELEASE) PRILOSEC 40 MG Take 40 mg by mouth 2 times a day.        SUMAtriptan Succinate (Tab) IMITREX 50 MG TAKE ONE TABLET BY MOUTH ONE TIME AS NEEDED FOR MIGRAINE. MAY REPEAT  IN 2 HOURS IF HEADACHE PERSISTS. MAX 2 TABLETS IN 24 HOURS        .                 Medicines prescribed today were sent to:     [facility][id number] - [place], [place] - [facility] AT [address] [place] [address]    Phone: [phone] Fax: [phone]    Open 24 Hours?: No      Medication refill instructions:       If your prescription bottle indicates you have medication refills left, it is not necessary to call your provider’s office. Please contact your pharmacy and they will refill your medication.    If your prescription bottle indicates you do not have any refills left, you may request refills at any time through one of the following ways: The online [employer] system (except Urgent Care), by calling your provider’s office, or by asking your pharmacy to contact your provider’s office with a refill request. Medication refills are processed only during regular business hours and may not be available until the next business day. Your provider may request additional information or to have a follow-up visit with you prior to refilling your medication.   *Please Note: Medication refills are assigned a new Rx number when refilled electronically. Your pharmacy may indicate that no refills were authorized even though a new prescription for the same medication is available at the pharmacy. Please request the medicine by name with the pharmacy before contacting your provider for a refill.        Your To Do List     Future Labs/Procedures Complete By Expires    FREE THYROXINE  As directed 12/29/2017    T3 FREE  As directed 12/29/2017    TSH  As directed 12/29/2017    US-SOFT TISSUES OF HEAD - NECK  As directed 12/27/2017         [employer] Access Code: Activation code not generated  Current [employer] Status: Active

## 2017-07-11 RX ORDER — HYDROCORTISONE 20 MG/1
TABLET ORAL
Qty: 60 TAB | Refills: 0 | Status: SHIPPED | OUTPATIENT
Start: 2017-07-11 | End: 2017-09-03 | Stop reason: SDUPTHER

## 2017-07-12 ENCOUNTER — HOSPITAL ENCOUNTER (OUTPATIENT)
Dept: RADIOLOGY | Facility: MEDICAL CENTER | Age: 37
End: 2017-07-12
Attending: INTERNAL MEDICINE
Payer: COMMERCIAL

## 2017-07-12 DIAGNOSIS — E05.80 OTHER THYROTOXICOSIS: ICD-10-CM

## 2017-07-12 PROCEDURE — 76536 US EXAM OF HEAD AND NECK: CPT

## 2017-07-24 RX ORDER — METHIMAZOLE 10 MG/1
TABLET ORAL
Qty: 60 TAB | Refills: 2 | Status: SHIPPED | OUTPATIENT
Start: 2017-07-24 | End: 2018-03-03 | Stop reason: SDUPTHER

## 2017-07-31 ENCOUNTER — HOSPITAL ENCOUNTER (OUTPATIENT)
Dept: LAB | Facility: MEDICAL CENTER | Age: 37
End: 2017-07-31
Attending: INTERNAL MEDICINE
Payer: COMMERCIAL

## 2017-07-31 ENCOUNTER — OFFICE VISIT (OUTPATIENT)
Dept: ENDOCRINOLOGY | Facility: MEDICAL CENTER | Age: 37
End: 2017-07-31
Payer: COMMERCIAL

## 2017-07-31 VITALS
SYSTOLIC BLOOD PRESSURE: 112 MMHG | HEIGHT: 63 IN | WEIGHT: 144.6 LBS | BODY MASS INDEX: 25.62 KG/M2 | HEART RATE: 81 BPM | DIASTOLIC BLOOD PRESSURE: 72 MMHG | OXYGEN SATURATION: 99 %

## 2017-07-31 DIAGNOSIS — E05.80 OTHER THYROTOXICOSIS: ICD-10-CM

## 2017-07-31 DIAGNOSIS — E04.2 MULTIPLE THYROID NODULES: ICD-10-CM

## 2017-07-31 LAB
T3FREE SERPL-MCNC: 4.21 PG/ML (ref 2.4–4.2)
T4 FREE SERPL-MCNC: 0.87 NG/DL (ref 0.53–1.43)
TSH SERPL DL<=0.005 MIU/L-ACNC: 0.87 UIU/ML (ref 0.3–3.7)

## 2017-07-31 PROCEDURE — 36415 COLL VENOUS BLD VENIPUNCTURE: CPT

## 2017-07-31 PROCEDURE — 84481 FREE ASSAY (FT-3): CPT

## 2017-07-31 PROCEDURE — 84443 ASSAY THYROID STIM HORMONE: CPT

## 2017-07-31 PROCEDURE — 84439 ASSAY OF FREE THYROXINE: CPT

## 2017-07-31 PROCEDURE — 99213 OFFICE O/P EST LOW 20 MIN: CPT | Performed by: INTERNAL MEDICINE

## 2017-07-31 NOTE — MR AVS SNAPSHOT
"Gala Camarena   2017 11:00 AM   Office Visit   MRN: [id number]    Department:  Endocrinology [facility]   Dept Phone:  [phone]    Description:  Female : 1980   Provider:  Sebastian Soler M.D.           Reason for Visit     Thyrotoxicosis           Allergies as of 2017     Allergen Noted Reactions    Ativan 2015       Hallucinations, restless    Compazine 2014   Palpitations    Hypertension    Prochlorperazine 2014   Palpitations    \"Compazine\"; severe HTN    Tape 2014   Rash    Paper tape is okay.      You were diagnosed with     Other thyrotoxicosis   [[id number]]         Vital Signs     Blood Pressure Pulse Height Weight Body Mass Index Oxygen Saturation    112/72 mmHg 81 1.6 m (5' 3\") 65.59 kg (144 lb 9.6 oz) 25.62 kg/m2 99%    Smoking Status                   Former Smoker           Basic Information     Date Of Birth Sex Race Ethnicity Preferred Language    1980 Female White Non- English      Your appointments     Sep 05, 2017  1:40 PM   Established Patient with Sebastian Soler M.D.   [facility] ([facility]    [address], [address]  [facility] [place] [address] [phone]           You will be receiving a confirmation call a few days before your appointment from our automated call confirmation system.              Problem List              ICD-10-CM Priority Class Noted - Resolved    S/P colectomy Z90.49 Low  2014 - Present    Ulcerative colitis (CMS-HCC) K51.90 Medium  2014 - Present    Mixed anxiety and depressive disorder F41.8 Low  2014 - Present    Adrenal insufficiency (CMS-HCC) E27.40 Medium  2014 - Present    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CMS-HCC) C90.00 High  2014 - Present    IgA gammopathy D47.2 Low  Unknown - Present    Thyrotoxicosis E05.90 Low  2014 - Present    Headache, classical migraine G43.109 Low  2014 - Present    Chronic abdominal pain R10.9, G89.29 Low  " 3/6/2015 - Present    Ulcerative colitis (CMS-HCC) K51.90 Low  5/20/2015 - Present    Type 2 diabetes mellitus E11.9 Medium  12/14/2015 - Present    Acne vulgaris L70.0 Low  12/15/2015 - Present    IUD (intrauterine device) in place-mirena 8/2013 Z97.5 Low  6/15/2016 - Present      Health Maintenance        Date Due Completion Dates    IMM HEP B VACCINE (1 of 3 - Primary Series) 1980 ---    IMM DTaP/Tdap/Td Vaccine (1 - Tdap) 5/25/1998 ---    IMM PNEUMOCOCCAL 19-64 (ADULT) HIGHEST RISK SERIES (1 of 3 - PCV13) 5/25/1998 ---    FASTING LIPID PROFILE 2/13/2017 2/13/2016, 12/8/2014    URINE ACR / MICROALBUMIN 2/13/2017 2/13/2016    PAP SMEAR 5/14/2017 5/14/2014 (Done)    Override on 5/14/2014: Done    IMM INFLUENZA (1) 9/1/2017 12/19/2014, 2/24/2014    A1C SCREENING 11/30/2017 5/31/2017, 2/13/2016, 4/27/2015, 12/8/2014, 9/19/2014    RETINAL SCREENING 5/31/2018 5/31/2017    DIABETES MONOFILAMENT / LE EXAM 5/31/2018 5/31/2017    SERUM CREATININE 6/26/2018 6/26/2017, 2/13/2016, 5/30/2015, 5/29/2015, 5/27/2015, 5/24/2015, 5/23/2015, 5/22/2015, 5/21/2015, 5/8/2015, 4/27/2015, 4/27/2015, 12/29/2014, 12/8/2014, 11/26/2014, 10/29/2014, 10/2/2014, 9/19/2014, 9/4/2014, 9/1/2014, 8/30/2014, 8/29/2014, 8/28/2014, 8/27/2014, 8/26/2014, 8/25/2014, 8/24/2014, 8/23/2014, 8/22/2014, 8/21/2014, 8/21/2014, 8/21/2014, 8/18/2014, 8/13/2014, 8/12/2014, 8/11/2014, 7/21/2014, 6/23/2014, 6/23/2014, 6/22/2014, 6/21/2014, 6/21/2014, 6/20/2014, 6/19/2014, 6/19/2014, 6/18/2014, 6/17/2014, 6/17/2014, 6/17/2014, 6/15/2014, 6/14/2014, 6/13/2014, 6/11/2014, 6/10/2014, 6/8/2014, 6/7/2014, 6/6/2014, 6/5/2014, 6/4/2014, 6/3/2014, 5/31/2014, 5/29/2014, 5/28/2014, 5/26/2014, 5/22/2014, 5/20/2014, 5/19/2014, 5/18/2014, 5/17/2014, 5/16/2014, 5/16/2014, 5/15/2014, 5/14/2014, 5/13/2014, 5/12/2014, 5/10/2014, 5/9/2014, 5/2/2014, 4/28/2014, 4/12/2014, 4/11/2014, 4/10/2014, 4/9/2014, 4/8/2014, 4/7/2014, 4/6/2014, 4/5/2014, 4/4/2014, 3/31/2014, 3/30/2014,  3/29/2014, 3/27/2014, 3/26/2014, 3/25/2014, 3/24/2014, 3/23/2014, 3/22/2014, 3/21/2014, 3/20/2014, 3/19/2014, 3/18/2014, 3/17/2014, 3/16/2014, 3/15/2014, 3/14/2014, 3/13/2014, 3/12/2014, 3/11/2014, 3/10/2014, 3/9/2014, 3/8/2014, 3/7/2014, 3/6/2014, 3/6/2014, 3/5/2014, 3/3/2014, 3/2/2014, 2/27/2014, 2/26/2014, 2/25/2014, 2/22/2014, 2/21/2014, 2/20/2014, 2/15/2014            Current Immunizations     Influenza TIV (IM) 2/24/2014    Influenza Vaccine Quad Inj (Preserved) 12/19/2014    Tuberculin Skin Test  Incomplete      Below and/or attached are the medications your provider expects you to take. Review all of your home medications and newly ordered medications with your provider and/or pharmacist. Follow medication instructions as directed by your provider and/or pharmacist. Please keep your medication list with you and share with your provider. Update the information when medications are discontinued, doses are changed, or new medications (including over-the-counter products) are added; and carry medication information at all times in the event of emergency situations     Allergies:  ATIVAN - (reactions not documented)     COMPAZINE - Palpitations     PROCHLORPERAZINE - Palpitations     TAPE - Rash               Medications  Valid as of: July 31, 2017 - 11:20 AM    Generic Name Brand Name Tablet Size Instructions for use    GlipiZIDE (TABLET SR 24 HR) GLIPIZIDE XL 5 MG TAKE ONE TABLET BY MOUTH DAILY        Hydrocortisone (Tab) CORTEF 20 MG TAKE ONE TABLET BY MOUTH TWICE A DAY        Ibuprofen (Cap) Ibuprofen 200 MG Take 4 Caps by mouth as needed. Indications: Mild to Moderate Pain        MethIMAzole (Tab) TAPAZOLE 10 MG TAKE ONE AND ONE-HALF (1 & 1/2) TABLET BY MOUTH DAILY        Minocycline HCl (Tab) DYNACIN 100 MG Take 1 Tab by mouth 2 times a day.        Omeprazole (CAPSULE DELAYED RELEASE) PRILOSEC 40 MG Take 40 mg by mouth 2 times a day.        SUMAtriptan Succinate (Tab) IMITREX 50 MG TAKE ONE TABLET BY MOUTH  ONE TIME AS NEEDED FOR MIGRAINE. MAY REPEAT IN 2 HOURS IF HEADACHE PERSISTS. MAX 2 TABLETS IN 24 HOURS        .                 Medicines prescribed today were sent to:     [facility] #[id number] - [place], [place] - [facility] AT [address] [address]    Phone: [phone] Fax: [phone]    Open 24 Hours?: No      Medication refill instructions:       If your prescription bottle indicates you have medication refills left, it is not necessary to call your provider’s office. Please contact your pharmacy and they will refill your medication.    If your prescription bottle indicates you do not have any refills left, you may request refills at any time through one of the following ways: The online [employer] system (except Urgent Care), by calling your provider’s office, or by asking your pharmacy to contact your provider’s office with a refill request. Medication refills are processed only during regular business hours and may not be available until the next business day. Your provider may request additional information or to have a follow-up visit with you prior to refilling your medication.   *Please Note: Medication refills are assigned a new Rx number when refilled electronically. Your pharmacy may indicate that no refills were authorized even though a new prescription for the same medication is available at the pharmacy. Please request the medicine by name with the pharmacy before contacting your provider for a refill.        Your To Do List     Future Labs/Procedures Complete By Expires    FREE THYROXINE  As directed 1/31/2018    T3 FREE  As directed 1/31/2018    THYROTROPIN RECEP AB  As directed 7/31/2018    TSH  As directed 1/31/2018         [employer] Access Code: Activation code not generated  Current [employer] Status: Active

## 2017-07-31 NOTE — PROGRESS NOTES
"Chief Complaint   Patient presents with   • Thyrotoxicosis        HPI:        1. Thyrotoxicosis.    The patient is tolerating methimazole 10 mg per day very well.  She is clinically euthyroid.  Weight is stable and no thyrotoxic symptoms remain.  She had thyroid blood tests done today but the results are pending.  Last month her T4 was 0.9 and free T3 normal at 3.8 and TSH low normal at 0.2.  I suspect the levels will be lower now so I have asked her to go ahead with the methimazole 5 mg per day.      I reviewed her ultrasound with the radiologist.  The report that is in the record is not accurate.  Her left lobe has a large nodule.  The right lobe has smaller nondescript nodules and cysts.  However, the dominant nodule in the left lobe could likely be an autonomously functioning nodule which is causing her thyrotoxicosis.  I am going to reduce her methimazole dose to 5 mg and in about two weeks she will start a low iodine diet.  When we get our next thyroid levels, if they are reasonable, I am going to have her stop methimazole for a week to enable us to get an isotopic I-123 scan to define the activity of this large nodule and perhaps others.  She will be on a low iodine diet previously for two weeks.     ROS:  All other systems reported as negative or unchanged since last exam      Allergies:   Allergies   Allergen Reactions   • Ativan      Hallucinations, restless   • Compazine Palpitations     Hypertension   • Prochlorperazine Palpitations     \"Compazine\"; severe HTN   • Tape Rash     Paper tape is okay.       Current medicines including changes today:  Current Outpatient Prescriptions   Medication Sig Dispense Refill   • methimazole (TAPAZOLE) 10 MG Tab TAKE ONE AND ONE-HALF (1 & 1/2) TABLET BY MOUTH DAILY (Patient taking differently: TAKE ONE AND ONE-HALF (1 ) TABLET BY MOUTH DAILY) 60 Tab 2   • hydrocortisone (CORTEF) 20 MG Tab TAKE ONE TABLET BY MOUTH TWICE A DAY 60 Tab 0   • minocycline (DYNACIN) 100 MG " "tablet Take 1 Tab by mouth 2 times a day. 60 Tab 11   • GLIPIZIDE XL 5 MG TABLET SR 24 HR TAKE ONE TABLET BY MOUTH DAILY 30 Tab 5   • omeprazole (PRILOSEC) 40 MG delayed-release capsule Take 40 mg by mouth 2 times a day.     • sumatriptan (IMITREX) 50 MG Tab TAKE ONE TABLET BY MOUTH ONE TIME AS NEEDED FOR MIGRAINE. MAY REPEAT IN 2 HOURS IF HEADACHE PERSISTS. MAX 2 TABLETS IN 24 HOURS 10 Tab 5   • Ibuprofen (ADVIL) 200 MG CAPS Take 4 Caps by mouth as needed. Indications: Mild to Moderate Pain       No current facility-administered medications for this visit.        Past Medical History   Diagnosis Date   • Ulcerative colitis, chronic (CMS-HCC)    • Ovarian cyst    • Opioid dependence (CMS-HCC) 2014     hx of heavy use of dilaudid   • Mixed anxiety and depressive disorder 2014   • Chronic erosive gastritis 8/14     Dr Mercedes   • Hypercalcemia 2014   • Pancreatitis 2014   • Blood transfusion, without reported diagnosis 8/2013   • Adrenal insufficiency (CMS-HCC) 2014     21-hydroxylase antibody = neg   • S/P total colectomy 2014   • IgA gammopathy 2014   • Breath shortness    • Ileostomy in place (CMS-HCC)    • Pain 05-08-15     abd., rectum, 3/10   • Headache, classical migraine 11/2014     since 8yoa, triggers=unk, imitrex works well.  freq=3-4x/month.     • Thyrotoxicosis 2015     hyper, on methimazole, sees endo   • Type II or unspecified type diabetes mellitus without mention of complication, not stated as uncontrolled 2/2014     NIDDM, sees endo.  no GDM.  takes glipizide   • Kidney disease      hx of pyelo, outpat tx   • GERD (gastroesophageal reflux disease)      omeprazole   • Anxiety      in remission   • Depression      in remission   • Multiple thyroid nodules 7/31/2017       PHYSICAL EXAM:    /72 mmHg  Pulse 81  Ht 1.6 m (5' 3\")  Wt 65.59 kg (144 lb 9.6 oz)  BMI 25.62 kg/m2  SpO2 99%    Gen.   appears healthy and clinically euthyroid    Skin   appropriate for sex and age    HEENT  no eye signs " of Graves' disease    Neck   thyroid gland is irregular and lumpy on palpation with left lobe more prominent than right.             No satellite nodules or masses elsewhere in the neck or supraclavicular areas.    Heart  regular    Extremities  no edema    Neuro  gait and station normal, no tremor    Psych  appropriate, calm, pleasant      ASSESSMENT AND RECOMMENDATIONS    1.  Thyrotoxicosis, ? Toxic nodule (s)             Decrease methimazole to 5 mg per day and in 2 weeks start a low iodine diet.             If we are able to discontinue methimazole next month I will do a I-123 scan to see if she has a large autonomous nodule in the left lobe  - FREE THYROXINE; Future  - T3 FREE; Future  - TSH; Future  - THYROTROPIN RECEP AB; Future    2. Multiple thyroid nodules            Predominantly a large nodule in the left lobe            Right lobe has small nondescript nodules and cysts      DISPOSITION: Return in about 1 month (around 8/31/2017).       Sebastian Soler M.D.    Copies to: EMILE WagonerR.N. [phone]

## 2017-09-05 RX ORDER — HYDROCORTISONE 20 MG/1
TABLET ORAL
Qty: 60 TAB | Refills: 0 | Status: SHIPPED | OUTPATIENT
Start: 2017-09-05 | End: 2017-11-04 | Stop reason: SDUPTHER

## 2017-10-05 ENCOUNTER — TELEPHONE (OUTPATIENT)
Dept: OBGYN | Facility: CLINIC | Age: 37
End: 2017-10-05

## 2017-10-05 NOTE — TELEPHONE ENCOUNTER
Patient called stating that she is having issues with her IUD. Scheduled her for 12/01/17. She doesn't think that is acceptable and wants to know what she can do. Please call patient

## 2017-10-10 NOTE — TELEPHONE ENCOUNTER
Tried calling patient back but no answer and voicemail not set up. Patient has appt on 12/1/17 and wants to be seen sooner. Will call patient if any appt become available. For now, 12/1/17 is the soonest.

## 2017-10-30 RX ORDER — GLIPIZIDE 5 MG/1
TABLET, FILM COATED, EXTENDED RELEASE ORAL
Qty: 90 TAB | Refills: 1 | Status: SHIPPED | OUTPATIENT
Start: 2017-10-30 | End: 2018-10-25

## 2017-11-06 RX ORDER — HYDROCORTISONE 20 MG/1
TABLET ORAL
Qty: 60 TAB | Refills: 0 | Status: SHIPPED | OUTPATIENT
Start: 2017-11-06 | End: 2018-01-07 | Stop reason: SDUPTHER

## 2018-01-08 RX ORDER — HYDROCORTISONE 20 MG/1
TABLET ORAL
Qty: 60 TAB | Refills: 1 | Status: SHIPPED | OUTPATIENT
Start: 2018-01-08 | End: 2018-05-28 | Stop reason: SDUPTHER

## 2018-03-05 RX ORDER — METHIMAZOLE 10 MG/1
TABLET ORAL
Qty: 45 TAB | Refills: 1 | Status: SHIPPED | OUTPATIENT
Start: 2018-03-05 | End: 2018-08-13 | Stop reason: SDUPTHER

## 2018-05-28 DIAGNOSIS — G43.109 MIGRAINE WITH AURA AND WITHOUT STATUS MIGRAINOSUS, NOT INTRACTABLE: ICD-10-CM

## 2018-05-29 RX ORDER — SUMATRIPTAN 50 MG/1
TABLET, FILM COATED ORAL
Qty: 9 TAB | Refills: 4 | Status: SHIPPED | OUTPATIENT
Start: 2018-05-29 | End: 2019-05-30 | Stop reason: SDUPTHER

## 2018-05-30 RX ORDER — HYDROCORTISONE 20 MG/1
TABLET ORAL
Qty: 30 TAB | Refills: 0 | Status: SHIPPED | OUTPATIENT
Start: 2018-05-30 | End: 2018-07-06 | Stop reason: SDUPTHER

## 2018-06-17 RX ORDER — MINOCYCLINE HYDROCHLORIDE 100 MG/1
TABLET ORAL
Qty: 30 TAB | Refills: 10 | Status: SHIPPED | OUTPATIENT
Start: 2018-06-17 | End: 2018-12-28 | Stop reason: SDUPTHER

## 2018-06-25 RX ORDER — HYDROCORTISONE 20 MG/1
TABLET ORAL
Refills: 0 | OUTPATIENT
Start: 2018-06-25

## 2018-07-06 RX ORDER — HYDROCORTISONE 20 MG/1
TABLET ORAL
Qty: 30 TAB | Refills: 0 | Status: SHIPPED | OUTPATIENT
Start: 2018-07-06 | End: 2018-10-25

## 2018-07-06 NOTE — TELEPHONE ENCOUNTER
Please call patient  I sent a short refill of her hydrocortisone however she needs to continue or reestablish with endo for any future refills.

## 2018-08-14 RX ORDER — METHIMAZOLE 10 MG/1
TABLET ORAL
Qty: 45 TAB | Refills: 0 | Status: SHIPPED | OUTPATIENT
Start: 2018-08-14 | End: 2018-10-14 | Stop reason: SDUPTHER

## 2018-08-27 RX ORDER — HYDROCORTISONE 20 MG/1
TABLET ORAL
Refills: 0 | OUTPATIENT
Start: 2018-08-27

## 2018-08-30 ENCOUNTER — TELEPHONE (OUTPATIENT)
Dept: ENDOCRINOLOGY | Facility: MEDICAL CENTER | Age: 38
End: 2018-08-30

## 2018-08-30 DIAGNOSIS — E27.40 ADRENAL INSUFFICIENCY (HCC): Primary | ICD-10-CM

## 2018-08-30 DIAGNOSIS — E05.80 OTHER THYROTOXICOSIS WITHOUT THYROTOXIC CRISIS OR STORM: ICD-10-CM

## 2018-08-30 DIAGNOSIS — E11.9 TYPE 2 DIABETES MELLITUS WITHOUT COMPLICATION, WITHOUT LONG-TERM CURRENT USE OF INSULIN (HCC): ICD-10-CM

## 2018-08-30 RX ORDER — HYDROCORTISONE 10 MG/1
10 TABLET ORAL 2 TIMES DAILY
Qty: 60 TAB | Refills: 2 | Status: SHIPPED | OUTPATIENT
Start: 2018-08-30 | End: 2019-03-17 | Stop reason: SDUPTHER

## 2018-08-30 NOTE — TELEPHONE ENCOUNTER
Telephone conversation with patient    Pharmacy called for refills which brought her record back to my attention. She has not been seen in over a year and no laboratory data over this period of time as well. I don't know how she slipped through. I think she didn't feel the need for follow-up because she is feeling well.    She is taking methimazole 5 mg a day. Also taking hydrocortisone 10 mg a.m. and 5 mg p.m. She has not had any intercurrent illnesses or problems.    I reminded her she has a large thyroid nodule that needs attention and diagnosis. Also, I am questioning whether she really has adrenal insufficiency and I would like to try to reestablish the diagnosis or wean her off of cortisone eventually if possible. I emphasized that these are very important issues to address.    Sebastian Soler M.D.

## 2018-09-06 ENCOUNTER — HOSPITAL ENCOUNTER (OUTPATIENT)
Dept: LAB | Facility: MEDICAL CENTER | Age: 38
End: 2018-09-06
Attending: INTERNAL MEDICINE
Payer: COMMERCIAL

## 2018-09-06 DIAGNOSIS — E27.40 ADRENAL INSUFFICIENCY (HCC): ICD-10-CM

## 2018-09-06 DIAGNOSIS — E05.80 OTHER THYROTOXICOSIS WITHOUT THYROTOXIC CRISIS OR STORM: ICD-10-CM

## 2018-09-06 DIAGNOSIS — E11.9 TYPE 2 DIABETES MELLITUS WITHOUT COMPLICATION, WITHOUT LONG-TERM CURRENT USE OF INSULIN (HCC): ICD-10-CM

## 2018-09-06 LAB
ALBUMIN SERPL BCP-MCNC: 3.8 G/DL (ref 3.2–4.9)
ALBUMIN/GLOB SERPL: 1.4 G/DL
ALP SERPL-CCNC: 70 U/L (ref 30–99)
ALT SERPL-CCNC: 18 U/L (ref 2–50)
ANION GAP SERPL CALC-SCNC: 9 MMOL/L (ref 0–11.9)
APPEARANCE UR: ABNORMAL
AST SERPL-CCNC: 17 U/L (ref 12–45)
BASOPHILS # BLD AUTO: 0.3 % (ref 0–1.8)
BASOPHILS # BLD: 0.02 K/UL (ref 0–0.12)
BILIRUB SERPL-MCNC: 0.4 MG/DL (ref 0.1–1.5)
BILIRUB UR QL STRIP.AUTO: NEGATIVE
BUN SERPL-MCNC: 17 MG/DL (ref 8–22)
CALCIUM SERPL-MCNC: 9.3 MG/DL (ref 8.5–10.5)
CHLORIDE SERPL-SCNC: 107 MMOL/L (ref 96–112)
CO2 SERPL-SCNC: 25 MMOL/L (ref 20–33)
COLOR UR: ABNORMAL
CREAT SERPL-MCNC: 0.96 MG/DL (ref 0.5–1.4)
EOSINOPHIL # BLD AUTO: 0.08 K/UL (ref 0–0.51)
EOSINOPHIL NFR BLD: 1 % (ref 0–6.9)
ERYTHROCYTE [DISTWIDTH] IN BLOOD BY AUTOMATED COUNT: 43.3 FL (ref 35.9–50)
EST. AVERAGE GLUCOSE BLD GHB EST-MCNC: 146 MG/DL
GLOBULIN SER CALC-MCNC: 2.7 G/DL (ref 1.9–3.5)
GLUCOSE SERPL-MCNC: 145 MG/DL (ref 65–99)
GLUCOSE UR STRIP.AUTO-MCNC: NEGATIVE MG/DL
HBA1C MFR BLD: 6.7 % (ref 0–5.6)
HCT VFR BLD AUTO: 42.3 % (ref 37–47)
HGB BLD-MCNC: 13.7 G/DL (ref 12–16)
IMM GRANULOCYTES # BLD AUTO: 0.02 K/UL (ref 0–0.11)
IMM GRANULOCYTES NFR BLD AUTO: 0.3 % (ref 0–0.9)
KETONES UR STRIP.AUTO-MCNC: ABNORMAL MG/DL
LEUKOCYTE ESTERASE UR QL STRIP.AUTO: ABNORMAL
LYMPHOCYTES # BLD AUTO: 1.19 K/UL (ref 1–4.8)
LYMPHOCYTES NFR BLD: 15.1 % (ref 22–41)
MCH RBC QN AUTO: 28.7 PG (ref 27–33)
MCHC RBC AUTO-ENTMCNC: 32.4 G/DL (ref 33.6–35)
MCV RBC AUTO: 88.7 FL (ref 81.4–97.8)
MICRO URNS: ABNORMAL
MONOCYTES # BLD AUTO: 0.33 K/UL (ref 0–0.85)
MONOCYTES NFR BLD AUTO: 4.2 % (ref 0–13.4)
NEUTROPHILS # BLD AUTO: 6.23 K/UL (ref 2–7.15)
NEUTROPHILS NFR BLD: 79.1 % (ref 44–72)
NITRITE UR QL STRIP.AUTO: NEGATIVE
NRBC # BLD AUTO: 0 K/UL
NRBC BLD-RTO: 0 /100 WBC
PH UR STRIP.AUTO: 5 [PH]
PLATELET # BLD AUTO: 222 K/UL (ref 164–446)
PMV BLD AUTO: 10.2 FL (ref 9–12.9)
POTASSIUM SERPL-SCNC: 4.2 MMOL/L (ref 3.6–5.5)
PROT SERPL-MCNC: 6.5 G/DL (ref 6–8.2)
PROT UR QL STRIP: NEGATIVE MG/DL
RBC # BLD AUTO: 4.77 M/UL (ref 4.2–5.4)
RBC UR QL AUTO: NEGATIVE
SODIUM SERPL-SCNC: 141 MMOL/L (ref 135–145)
SP GR UR STRIP.AUTO: 1.04
T4 FREE SERPL-MCNC: 1.58 NG/DL (ref 0.53–1.43)
TSH SERPL DL<=0.005 MIU/L-ACNC: <0.005 UIU/ML (ref 0.38–5.33)
UROBILINOGEN UR STRIP.AUTO-MCNC: 0.2 MG/DL
WBC # BLD AUTO: 7.9 K/UL (ref 4.8–10.8)

## 2018-09-06 PROCEDURE — 84443 ASSAY THYROID STIM HORMONE: CPT

## 2018-09-06 PROCEDURE — 87086 URINE CULTURE/COLONY COUNT: CPT

## 2018-09-06 PROCEDURE — 80053 COMPREHEN METABOLIC PANEL: CPT

## 2018-09-06 PROCEDURE — 85025 COMPLETE CBC W/AUTO DIFF WBC: CPT

## 2018-09-06 PROCEDURE — 83036 HEMOGLOBIN GLYCOSYLATED A1C: CPT

## 2018-09-06 PROCEDURE — 81001 URINALYSIS AUTO W/SCOPE: CPT

## 2018-09-06 PROCEDURE — 84439 ASSAY OF FREE THYROXINE: CPT

## 2018-09-06 PROCEDURE — 36415 COLL VENOUS BLD VENIPUNCTURE: CPT

## 2018-09-07 LAB
BACTERIA #/AREA URNS HPF: ABNORMAL /HPF
EPI CELLS #/AREA URNS HPF: ABNORMAL /HPF
HYALINE CASTS #/AREA URNS LPF: ABNORMAL /LPF
RBC # URNS HPF: ABNORMAL /HPF
WBC #/AREA URNS HPF: ABNORMAL /HPF

## 2018-09-09 LAB
BACTERIA UR CULT: NORMAL
SIGNIFICANT IND 70042: NORMAL
SITE SITE: NORMAL
SOURCE SOURCE: NORMAL

## 2018-09-11 ENCOUNTER — OFFICE VISIT (OUTPATIENT)
Dept: ENDOCRINOLOGY | Facility: MEDICAL CENTER | Age: 38
End: 2018-09-11
Payer: COMMERCIAL

## 2018-09-11 VITALS
RESPIRATION RATE: 16 BRPM | WEIGHT: 144 LBS | HEIGHT: 64 IN | BODY MASS INDEX: 24.59 KG/M2 | DIASTOLIC BLOOD PRESSURE: 82 MMHG | OXYGEN SATURATION: 98 % | SYSTOLIC BLOOD PRESSURE: 120 MMHG | HEART RATE: 110 BPM

## 2018-09-11 DIAGNOSIS — E04.2 MULTIPLE THYROID NODULES: ICD-10-CM

## 2018-09-11 DIAGNOSIS — E27.40 ADRENAL INSUFFICIENCY (HCC): ICD-10-CM

## 2018-09-11 DIAGNOSIS — E05.80 OTHER THYROTOXICOSIS WITHOUT THYROTOXIC CRISIS OR STORM: ICD-10-CM

## 2018-09-11 PROCEDURE — 99214 OFFICE O/P EST MOD 30 MIN: CPT | Performed by: INTERNAL MEDICINE

## 2018-09-12 NOTE — PROGRESS NOTES
Chief Complaint   Patient presents with   • Thyrotoxicosis        HPI:      Thyrotoxicosis/ thyroid nodule    We are regrouping and resetting after an extended absence which was unintended.  I was making plans to do an I-123 uptake and scan to assess her thyroid gland and see if she had a functional nodule that might be treated with I-131.  However my opinion has now changed.  She still is a little bit thyrotoxic.  TSH is completely suppressed and her free T4 is mildly elevated at 1.5 (0.5-1.4).  She does not have thyroid stimulating immunoglobulins.  Her previous ultrasound is somewhat confusing.  Both sides of her thyroid are enlarged and my impression was that the left lobe is predominantly enlarged and the ultrasound said the same thing.  It couldn’t be clarified if there were innumerable little cystic structures with calcifications or one large 5.5 cm mass with microcalcifications.  Now I have a different opinion.  Right now the right lobe is definitely larger than the left and she agrees with that.  So it has enlarged over the past one year.  I don’t feel any satellite nodules or masses and the gland is irregular and difficult to characterize as to nodules on palpation.      So we will update her thyroid ultrasound and right now my inclination is to suggest and request a thyroidectomy to get this whole issue settled.  She would be in agreement with that.  In the meantime, I am going to increase her methimazole from 10 mg to 15 mg per day.  She is not very symptomatic.  She is tachycardic today but she thinks that is just from being in the office.  She does not have a tremor.  Her weight is stable.  She does have fatigue but no unusual weakness.  So we will see what the ultrasound looks like but my inclination is to refer her to a surgeon.     For adrenal insufficiency she continues to take hydrocortisone 10 mg AM and 5 mg PM.  My plan was to settle the thyroid issue and once that is settled and she is going to  "be euthyroid for the predictable future, I would try to wean her off the hydrocortisone and re-establish the need.  The diagnosis was made when she was quite ill in the hospital.  She came out of the hospital on hydrocortisone and we have not stopped it since.  I told her not to run out of her hydrocortisone and explained that is her stress hormone when ill or injured or having surgery she needs to increase the dose.  I am hopeful that eventually we can wean her off that as well.  In any event, that is the plan.  Ultrasound next.  Discussion and possible surgical referral.     ROS:  All other systems reported as negative or unchanged since last exam      Allergies:   Allergies   Allergen Reactions   • Ativan      Hallucinations, restless   • Compazine Palpitations     Hypertension   • Prochlorperazine Palpitations     \"Compazine\"; severe HTN   • Tape Rash     Paper tape is okay.       Current medicines including changes today:  Current Outpatient Prescriptions   Medication Sig Dispense Refill   • hydrocortisone (CORTEF) 10 MG Tab Take 1 Tab by mouth 2 times a day. 60 Tab 2   • minocycline (DYNACIN) 100 MG tablet TAKE ONE TABLET BY MOUTH TWICE A DAY 30 Tab 10   • omeprazole (PRILOSEC) 40 MG delayed-release capsule Take 40 mg by mouth 2 times a day.     • methimazole (TAPAZOLE) 10 MG Tab TAKE ONE AND ONE-HALF (15MG) TABLETS BY MOUTH DAILY 45 Tab 0   • hydrocortisone (CORTEF) 20 MG Tab TAKE ONE TABLET BY MOUTH TWICE A DAY 30 Tab 0   • SUMAtriptan (IMITREX) 50 MG Tab TAKE ONE TABLET BY MOUTH ONE TIME AS NEEDED FOR MIGRAINE; MAY REPEAT ONE TABLET IN 2 HOURS IF HEADACHE PERSISTS. MAX OF 2 TABLETS A DAY 9 Tab 4   • glipiZIDE SR (GLIPIZIDE XL) 5 MG TABLET SR 24 HR TAKE ONE TABLET BY MOUTH DAILY 90 Tab 1   • Ibuprofen (ADVIL) 200 MG CAPS Take 4 Caps by mouth as needed. Indications: Mild to Moderate Pain       No current facility-administered medications for this visit.         Past Medical History:   Diagnosis Date   • " "Adrenal insufficiency ([facility]) 2014    21-hydroxylase antibody = neg   • Anxiety     in remission   • Blood transfusion, without reported diagnosis 8/2013   • Breath shortness    • Chronic erosive gastritis 8/14    Dr Mercedes   • Depression     in remission   • GERD (gastroesophageal reflux disease)     omeprazole   • Headache, classical migraine 11/2014    since 8yoa, triggers=unk, imitrex works well.  freq=3-4x/month.     • Hypercalcemia 2014   • IgA gammopathy 2014   • Ileostomy in place ([facility])    • Kidney disease     hx of pyelo, outpat tx   • Mixed anxiety and depressive disorder 2014   • Multiple thyroid nodules 7/31/2017   • Opioid dependence ([facility]) 2014    hx of heavy use of dilaudid   • Ovarian cyst    • Pain 05-08-15    abd., rectum, 3/10   • Pancreatitis 2014   • S/P total colectomy 2014   • Thyrotoxicosis 2015    hyper, on methimazole, sees endo   • Type II or unspecified type diabetes mellitus without mention of complication, not stated as uncontrolled 2/2014    NIDDM, sees endo.  no GDM.  takes glipizide   • Ulcerative colitis, chronic ([facility])        PHYSICAL EXAM:    /82   Pulse (!) 110   Resp 16   Ht 1.626 m (5' 4\")   Wt 65.3 kg (144 lb)   SpO2 98%   BMI 24.72 kg/m²       Gen.   appears healthy. Not overtly thyrotoxic    Skin   appropriate for sex and age    HEENT  unremarkable    Neck   thyroid gland is enlarged bilaterally left more than right bosselated surface. No satellite nodules outside the gland elsewhere in the neck or supraclavicular areas    Heart  regular    Extremities  no edema    Neuro  gait and station normal, slight tremor of the hands    Psych  appropriate, calm, pleasant      ASSESSMENT AND RECOMMENDATIONS    1. Other thyrotoxicosis without thyrotoxic crisis or storm                 Update thyroid ultrasound                 Discuss definitive thyroidectomy    2. Multiple thyroid nodules      3. Adrenal insufficiency ([facility])               Continues small dose hydrocortisone " replacement until the thyroid situation is definitively settled. After that attempt to wean off cortisone to reestablish the need      DISPOSITION:        Sebastian Soler M.D.    Copies to: EMILE WagonerRTruN. [phone]

## 2018-09-21 DIAGNOSIS — E04.1 THYROID NODULE: Primary | ICD-10-CM

## 2018-09-27 ENCOUNTER — HOSPITAL ENCOUNTER (OUTPATIENT)
Dept: RADIOLOGY | Facility: MEDICAL CENTER | Age: 38
End: 2018-09-27
Attending: INTERNAL MEDICINE
Payer: COMMERCIAL

## 2018-09-27 DIAGNOSIS — E04.1 THYROID NODULE: ICD-10-CM

## 2018-09-27 PROCEDURE — 76536 US EXAM OF HEAD AND NECK: CPT

## 2018-10-01 ENCOUNTER — OFFICE VISIT (OUTPATIENT)
Dept: ENDOCRINOLOGY | Facility: MEDICAL CENTER | Age: 38
End: 2018-10-01
Payer: COMMERCIAL

## 2018-10-01 VITALS
BODY MASS INDEX: 25.1 KG/M2 | DIASTOLIC BLOOD PRESSURE: 80 MMHG | OXYGEN SATURATION: 93 % | SYSTOLIC BLOOD PRESSURE: 124 MMHG | WEIGHT: 147 LBS | RESPIRATION RATE: 16 BRPM | HEIGHT: 64 IN | HEART RATE: 118 BPM

## 2018-10-01 DIAGNOSIS — E05.80 OTHER THYROTOXICOSIS WITHOUT THYROTOXIC CRISIS OR STORM: Primary | ICD-10-CM

## 2018-10-01 DIAGNOSIS — E27.40 ADRENAL INSUFFICIENCY (HCC): ICD-10-CM

## 2018-10-01 PROCEDURE — 99213 OFFICE O/P EST LOW 20 MIN: CPT | Performed by: INTERNAL MEDICINE

## 2018-10-01 NOTE — PROGRESS NOTES
"Chief Complaint   Patient presents with   • Thyrotoxicosis        HPI:    Thyrotoxicosis           The patient is feeling considerably better now. She is aware that she has a tachycardia. No tremor. She has gained 3 pounds. She appears to be relatively calm.            The gland is clinically unchanged from previously described. The ultrasound shows 2 large spongiform nodules one in each lobe. These have not been biopsied they are large with a likely sampling error and partly cystic. If these are functional nodules I think they're too big to treat with I-131 effectively. If we treated with I-131 I have not sure what would be left over of the gland and I'm sure it will not disappear. I think the best resolution for completeness is thyroidectomy. She had a close friend are in agreement and I will make a referral to Dr. Douglass            She will need some coaching on hydrocortisone management after surgery    ROS:  All other systems reported as negative or unchanged since last exam      Allergies:   Allergies   Allergen Reactions   • Ativan      Hallucinations, restless   • Compazine Palpitations     Hypertension   • Prochlorperazine Palpitations     \"Compazine\"; severe HTN   • Tape Rash     Paper tape is okay.       Current medicines including changes today:  Current Outpatient Prescriptions   Medication Sig Dispense Refill   • hydrocortisone (CORTEF) 10 MG Tab Take 1 Tab by mouth 2 times a day. 60 Tab 2   • methimazole (TAPAZOLE) 10 MG Tab TAKE ONE AND ONE-HALF (15MG) TABLETS BY MOUTH DAILY 45 Tab 0   • minocycline (DYNACIN) 100 MG tablet TAKE ONE TABLET BY MOUTH TWICE A DAY 30 Tab 10   • SUMAtriptan (IMITREX) 50 MG Tab TAKE ONE TABLET BY MOUTH ONE TIME AS NEEDED FOR MIGRAINE; MAY REPEAT ONE TABLET IN 2 HOURS IF HEADACHE PERSISTS. MAX OF 2 TABLETS A DAY 9 Tab 4   • glipiZIDE SR (GLIPIZIDE XL) 5 MG TABLET SR 24 HR TAKE ONE TABLET BY MOUTH DAILY 90 Tab 1   • Ibuprofen (ADVIL) 200 MG CAPS Take 4 Caps by mouth as " "needed. Indications: Mild to Moderate Pain     • omeprazole (PRILOSEC) 40 MG delayed-release capsule Take 40 mg by mouth 2 times a day.     • hydrocortisone (CORTEF) 20 MG Tab TAKE ONE TABLET BY MOUTH TWICE A DAY 30 Tab 0     No current facility-administered medications for this visit.         Past Medical History:   Diagnosis Date   • Adrenal insufficiency ([facility]) 2014    21-hydroxylase antibody = neg   • Anxiety     in remission   • Blood transfusion, without reported diagnosis 8/2013   • Breath shortness    • Chronic erosive gastritis 8/14    Dr Mercedes   • Depression     in remission   • GERD (gastroesophageal reflux disease)     omeprazole   • Headache, classical migraine 11/2014    since 8yoa, triggers=unk, imitrex works well.  freq=3-4x/month.     • Hypercalcemia 2014   • IgA gammopathy 2014   • Ileostomy in place ([facility])    • Kidney disease     hx of pyelo, outpat tx   • Mixed anxiety and depressive disorder 2014   • Multiple thyroid nodules 7/31/2017   • Opioid dependence ([facility]) 2014    hx of heavy use of dilaudid   • Ovarian cyst    • Pain 05-08-15    abd., rectum, 3/10   • Pancreatitis 2014   • S/P total colectomy 2014   • Thyrotoxicosis 2015    hyper, on methimazole, sees endo   • Type II or unspecified type diabetes mellitus without mention of complication, not stated as uncontrolled 2/2014    NIDDM, sees endo.  no GDM.  takes glipizide   • Ulcerative colitis, chronic ([facility])        PHYSICAL EXAM:    /80 (BP Location: Right arm, Patient Position: Sitting, BP Cuff Size: Adult)   Pulse (!) 118   Resp 16   Ht 1.626 m (5' 4\")   Wt 66.7 kg (147 lb)   SpO2 93%   BMI 25.23 kg/m²      Gen.   appears healthy and comfortable    Skin   appropriate for sex and age    HEENT  no eye signs of Graves' disease    Neck   thyroid gland is enlarged as previously described right lobe larger than the left on palpation although that is not confirmed by ultrasound. The gland is nontender    Heart  regular    Extremities  " no edema    Neuro  gait and station normal, no tremor    Psych  appropriate, calm, pleasant    ASSESSMENT AND RECOMMENDATIONS    1. Other thyrotoxicosis without thyrotoxic crisis or storm                 Options discussed and the patient wishes thyroidectomy for definitive resolution. I will make the referral                Increase methimazole to 10 mg twice a day  - FREE THYROXINE; Future  - T3 FREE; Future  - TSH; Future     2.  Adrenal insufficiency            Continue hydrocortisone 10 mg a.m. and 5 mg p.m. She will need stress dosing with surgical pre-op and a day or 2 after surgery. I will try to coordinate with Dr. de león                             Repeat thyroid levels in about 2 weeks  DISPOSITION: Follow-up 2-3 weeks post op       Sebastian Soler M.D.    Copies to: Althea Camarena, A.P.R.N. [phone]                   Dr. Joshua Douglass

## 2018-10-16 RX ORDER — METHIMAZOLE 10 MG/1
TABLET ORAL
Qty: 45 TAB | Refills: 0 | Status: ON HOLD | OUTPATIENT
Start: 2018-10-16 | End: 2018-10-31

## 2018-10-25 DIAGNOSIS — Z01.812 PRE-OPERATIVE LABORATORY EXAMINATION: ICD-10-CM

## 2018-10-25 LAB
ANION GAP SERPL CALC-SCNC: 13 MMOL/L (ref 0–11.9)
BASOPHILS # BLD AUTO: 0.3 % (ref 0–1.8)
BASOPHILS # BLD: 0.03 K/UL (ref 0–0.12)
BUN SERPL-MCNC: 13 MG/DL (ref 8–22)
CALCIUM SERPL-MCNC: 10 MG/DL (ref 8.5–10.5)
CHLORIDE SERPL-SCNC: 105 MMOL/L (ref 96–112)
CO2 SERPL-SCNC: 19 MMOL/L (ref 20–33)
CREAT SERPL-MCNC: 0.92 MG/DL (ref 0.5–1.4)
EOSINOPHIL # BLD AUTO: 0.09 K/UL (ref 0–0.51)
EOSINOPHIL NFR BLD: 1 % (ref 0–6.9)
ERYTHROCYTE [DISTWIDTH] IN BLOOD BY AUTOMATED COUNT: 43.6 FL (ref 35.9–50)
GLUCOSE SERPL-MCNC: 97 MG/DL (ref 65–99)
HCT VFR BLD AUTO: 45.9 % (ref 37–47)
HGB BLD-MCNC: 14.8 G/DL (ref 12–16)
IMM GRANULOCYTES # BLD AUTO: 0.03 K/UL (ref 0–0.11)
IMM GRANULOCYTES NFR BLD AUTO: 0.3 % (ref 0–0.9)
LYMPHOCYTES # BLD AUTO: 1.75 K/UL (ref 1–4.8)
LYMPHOCYTES NFR BLD: 20 % (ref 22–41)
MCH RBC QN AUTO: 28.8 PG (ref 27–33)
MCHC RBC AUTO-ENTMCNC: 32.2 G/DL (ref 33.6–35)
MCV RBC AUTO: 89.3 FL (ref 81.4–97.8)
MONOCYTES # BLD AUTO: 0.49 K/UL (ref 0–0.85)
MONOCYTES NFR BLD AUTO: 5.6 % (ref 0–13.4)
NEUTROPHILS # BLD AUTO: 6.34 K/UL (ref 2–7.15)
NEUTROPHILS NFR BLD: 72.8 % (ref 44–72)
NRBC # BLD AUTO: 0 K/UL
NRBC BLD-RTO: 0 /100 WBC
PLATELET # BLD AUTO: 224 K/UL (ref 164–446)
PMV BLD AUTO: 9.5 FL (ref 9–12.9)
POTASSIUM SERPL-SCNC: 3.7 MMOL/L (ref 3.6–5.5)
RBC # BLD AUTO: 5.14 M/UL (ref 4.2–5.4)
SODIUM SERPL-SCNC: 137 MMOL/L (ref 135–145)
WBC # BLD AUTO: 8.7 K/UL (ref 4.8–10.8)

## 2018-10-25 PROCEDURE — 36415 COLL VENOUS BLD VENIPUNCTURE: CPT

## 2018-10-25 PROCEDURE — 85025 COMPLETE CBC W/AUTO DIFF WBC: CPT

## 2018-10-25 PROCEDURE — 80048 BASIC METABOLIC PNL TOTAL CA: CPT

## 2018-10-31 ENCOUNTER — HOSPITAL ENCOUNTER (OUTPATIENT)
Facility: MEDICAL CENTER | Age: 38
End: 2018-11-01
Attending: SURGERY | Admitting: SURGERY
Payer: COMMERCIAL

## 2018-10-31 DIAGNOSIS — G89.18 POSTOPERATIVE PAIN: ICD-10-CM

## 2018-10-31 LAB
B-HCG FREE SERPL-ACNC: <5 MIU/ML
IHCGL IHCGL: NEGATIVE MIU/ML

## 2018-10-31 PROCEDURE — 160035 HCHG PACU - 1ST 60 MINS PHASE I: Performed by: SURGERY

## 2018-10-31 PROCEDURE — 160036 HCHG PACU - EA ADDL 30 MINS PHASE I: Performed by: SURGERY

## 2018-10-31 PROCEDURE — 502594 HCHG SCISSOR HANDLE, HARMONIC ACE: Performed by: SURGERY

## 2018-10-31 PROCEDURE — 700111 HCHG RX REV CODE 636 W/ 250 OVERRIDE (IP)

## 2018-10-31 PROCEDURE — 84702 CHORIONIC GONADOTROPIN TEST: CPT

## 2018-10-31 PROCEDURE — 501838 HCHG SUTURE GENERAL: Performed by: SURGERY

## 2018-10-31 PROCEDURE — 160009 HCHG ANES TIME/MIN: Performed by: SURGERY

## 2018-10-31 PROCEDURE — 160042 HCHG SURGERY MINUTES - EA ADDL 1 MIN LEVEL 5: Performed by: SURGERY

## 2018-10-31 PROCEDURE — 88307 TISSUE EXAM BY PATHOLOGIST: CPT | Mod: 59

## 2018-10-31 PROCEDURE — 500002 HCHG ADHESIVE, DERMABOND: Performed by: SURGERY

## 2018-10-31 PROCEDURE — 160031 HCHG SURGERY MINUTES - 1ST 30 MINS LEVEL 5: Performed by: SURGERY

## 2018-10-31 PROCEDURE — 160002 HCHG RECOVERY MINUTES (STAT): Performed by: SURGERY

## 2018-10-31 PROCEDURE — 700101 HCHG RX REV CODE 250

## 2018-10-31 PROCEDURE — 96374 THER/PROPH/DIAG INJ IV PUSH: CPT

## 2018-10-31 PROCEDURE — 700111 HCHG RX REV CODE 636 W/ 250 OVERRIDE (IP): Performed by: ANESTHESIOLOGY

## 2018-10-31 PROCEDURE — 160048 HCHG OR STATISTICAL LEVEL 1-5: Performed by: SURGERY

## 2018-10-31 PROCEDURE — 700102 HCHG RX REV CODE 250 W/ 637 OVERRIDE(OP): Performed by: SURGERY

## 2018-10-31 PROCEDURE — A9270 NON-COVERED ITEM OR SERVICE: HCPCS | Performed by: SURGERY

## 2018-10-31 PROCEDURE — 700111 HCHG RX REV CODE 636 W/ 250 OVERRIDE (IP): Performed by: SURGERY

## 2018-10-31 PROCEDURE — 700102 HCHG RX REV CODE 250 W/ 637 OVERRIDE(OP): Performed by: ANESTHESIOLOGY

## 2018-10-31 PROCEDURE — A9270 NON-COVERED ITEM OR SERVICE: HCPCS | Performed by: ANESTHESIOLOGY

## 2018-10-31 PROCEDURE — G0378 HOSPITAL OBSERVATION PER HR: HCPCS

## 2018-10-31 RX ORDER — OXYCODONE HYDROCHLORIDE 10 MG/1
10 TABLET ORAL
Status: DISCONTINUED | OUTPATIENT
Start: 2018-10-31 | End: 2018-11-01 | Stop reason: HOSPADM

## 2018-10-31 RX ORDER — DEXAMETHASONE SODIUM PHOSPHATE 4 MG/ML
4 INJECTION, SOLUTION INTRA-ARTICULAR; INTRALESIONAL; INTRAMUSCULAR; INTRAVENOUS; SOFT TISSUE
Status: DISCONTINUED | OUTPATIENT
Start: 2018-10-31 | End: 2018-11-01 | Stop reason: HOSPADM

## 2018-10-31 RX ORDER — ACETAMINOPHEN 500 MG
1000 TABLET ORAL ONCE
Status: COMPLETED | OUTPATIENT
Start: 2018-10-31 | End: 2018-10-31

## 2018-10-31 RX ORDER — OMEPRAZOLE 20 MG/1
40 CAPSULE, DELAYED RELEASE ORAL DAILY
Status: DISCONTINUED | OUTPATIENT
Start: 2018-11-01 | End: 2018-11-01 | Stop reason: HOSPADM

## 2018-10-31 RX ORDER — CELECOXIB 200 MG/1
200 CAPSULE ORAL ONCE
Status: COMPLETED | OUTPATIENT
Start: 2018-10-31 | End: 2018-10-31

## 2018-10-31 RX ORDER — OXYCODONE HCL 5 MG/5 ML
10 SOLUTION, ORAL ORAL
Status: COMPLETED | OUTPATIENT
Start: 2018-10-31 | End: 2018-10-31

## 2018-10-31 RX ORDER — BUPIVACAINE HYDROCHLORIDE AND EPINEPHRINE 5; 5 MG/ML; UG/ML
INJECTION, SOLUTION PERINEURAL
Status: DISCONTINUED | OUTPATIENT
Start: 2018-10-31 | End: 2018-10-31 | Stop reason: HOSPADM

## 2018-10-31 RX ORDER — SODIUM CHLORIDE, SODIUM LACTATE, POTASSIUM CHLORIDE, CALCIUM CHLORIDE 600; 310; 30; 20 MG/100ML; MG/100ML; MG/100ML; MG/100ML
INJECTION, SOLUTION INTRAVENOUS ONCE
Status: COMPLETED | OUTPATIENT
Start: 2018-10-31 | End: 2018-10-31

## 2018-10-31 RX ORDER — LABETALOL HYDROCHLORIDE 5 MG/ML
5 INJECTION, SOLUTION INTRAVENOUS
Status: DISCONTINUED | OUTPATIENT
Start: 2018-10-31 | End: 2018-10-31 | Stop reason: HOSPADM

## 2018-10-31 RX ORDER — OXYCODONE HYDROCHLORIDE 5 MG/1
5 TABLET ORAL
Status: DISCONTINUED | OUTPATIENT
Start: 2018-10-31 | End: 2018-10-31 | Stop reason: HOSPADM

## 2018-10-31 RX ORDER — HYDROMORPHONE HYDROCHLORIDE 1 MG/ML
0.1 INJECTION, SOLUTION INTRAMUSCULAR; INTRAVENOUS; SUBCUTANEOUS
Status: DISCONTINUED | OUTPATIENT
Start: 2018-10-31 | End: 2018-10-31 | Stop reason: HOSPADM

## 2018-10-31 RX ORDER — HYDROMORPHONE HYDROCHLORIDE 1 MG/ML
0.4 INJECTION, SOLUTION INTRAMUSCULAR; INTRAVENOUS; SUBCUTANEOUS
Status: DISCONTINUED | OUTPATIENT
Start: 2018-10-31 | End: 2018-10-31 | Stop reason: HOSPADM

## 2018-10-31 RX ORDER — ONDANSETRON 2 MG/ML
4 INJECTION INTRAMUSCULAR; INTRAVENOUS
Status: DISCONTINUED | OUTPATIENT
Start: 2018-10-31 | End: 2018-10-31 | Stop reason: HOSPADM

## 2018-10-31 RX ORDER — OXYCODONE HCL 5 MG/5 ML
5 SOLUTION, ORAL ORAL
Status: COMPLETED | OUTPATIENT
Start: 2018-10-31 | End: 2018-10-31

## 2018-10-31 RX ORDER — ONDANSETRON 2 MG/ML
4 INJECTION INTRAMUSCULAR; INTRAVENOUS EVERY 4 HOURS PRN
Status: DISCONTINUED | OUTPATIENT
Start: 2018-10-31 | End: 2018-11-01 | Stop reason: HOSPADM

## 2018-10-31 RX ORDER — OXYCODONE HYDROCHLORIDE 5 MG/1
5 TABLET ORAL
Status: DISCONTINUED | OUTPATIENT
Start: 2018-10-31 | End: 2018-11-01 | Stop reason: HOSPADM

## 2018-10-31 RX ORDER — OXYCODONE HYDROCHLORIDE 5 MG/1
10 TABLET ORAL
Status: DISCONTINUED | OUTPATIENT
Start: 2018-10-31 | End: 2018-10-31 | Stop reason: HOSPADM

## 2018-10-31 RX ORDER — HYDROMORPHONE HYDROCHLORIDE 1 MG/ML
0.2 INJECTION, SOLUTION INTRAMUSCULAR; INTRAVENOUS; SUBCUTANEOUS
Status: DISCONTINUED | OUTPATIENT
Start: 2018-10-31 | End: 2018-10-31 | Stop reason: HOSPADM

## 2018-10-31 RX ORDER — SUMATRIPTAN 50 MG/1
50 TABLET, FILM COATED ORAL EVERY 8 HOURS PRN
Status: DISCONTINUED | OUTPATIENT
Start: 2018-10-31 | End: 2018-11-01 | Stop reason: HOSPADM

## 2018-10-31 RX ORDER — GABAPENTIN 300 MG/1
300 CAPSULE ORAL ONCE
Status: COMPLETED | OUTPATIENT
Start: 2018-10-31 | End: 2018-10-31

## 2018-10-31 RX ORDER — ACETAMINOPHEN 500 MG
1000 TABLET ORAL EVERY 6 HOURS
Status: DISCONTINUED | OUTPATIENT
Start: 2018-10-31 | End: 2018-11-01 | Stop reason: HOSPADM

## 2018-10-31 RX ORDER — MEPERIDINE HYDROCHLORIDE 25 MG/ML
12.5 INJECTION INTRAMUSCULAR; INTRAVENOUS; SUBCUTANEOUS
Status: DISCONTINUED | OUTPATIENT
Start: 2018-10-31 | End: 2018-10-31 | Stop reason: HOSPADM

## 2018-10-31 RX ORDER — SODIUM CHLORIDE, SODIUM LACTATE, POTASSIUM CHLORIDE, CALCIUM CHLORIDE 600; 310; 30; 20 MG/100ML; MG/100ML; MG/100ML; MG/100ML
INJECTION, SOLUTION INTRAVENOUS CONTINUOUS
Status: DISCONTINUED | OUTPATIENT
Start: 2018-10-31 | End: 2018-10-31 | Stop reason: HOSPADM

## 2018-10-31 RX ADMIN — FENTANYL CITRATE 50 MCG: 50 INJECTION, SOLUTION INTRAMUSCULAR; INTRAVENOUS at 17:25

## 2018-10-31 RX ADMIN — ACETAMINOPHEN 1000 MG: 500 TABLET ORAL at 18:48

## 2018-10-31 RX ADMIN — ACETAMINOPHEN 1000 MG: 500 TABLET ORAL at 23:59

## 2018-10-31 RX ADMIN — GABAPENTIN 300 MG: 300 CAPSULE ORAL at 13:48

## 2018-10-31 RX ADMIN — FENTANYL CITRATE 50 MCG: 50 INJECTION, SOLUTION INTRAMUSCULAR; INTRAVENOUS at 17:00

## 2018-10-31 RX ADMIN — ACETAMINOPHEN 1000 MG: 500 TABLET, FILM COATED ORAL at 13:49

## 2018-10-31 RX ADMIN — CALCIUM CARBONATE-CHOLECALCIFEROL TAB 250 MG-125 UNIT 2 TABLET: 250-125 TAB at 18:49

## 2018-10-31 RX ADMIN — SODIUM CHLORIDE, SODIUM LACTATE, POTASSIUM CHLORIDE, CALCIUM CHLORIDE: 600; 310; 30; 20 INJECTION, SOLUTION INTRAVENOUS at 13:30

## 2018-10-31 RX ADMIN — OXYCODONE HYDROCHLORIDE 10 MG: 5 SOLUTION ORAL at 17:00

## 2018-10-31 RX ADMIN — CELECOXIB 200 MG: 200 CAPSULE ORAL at 13:48

## 2018-10-31 RX ADMIN — ONDANSETRON 4 MG: 2 INJECTION INTRAMUSCULAR; INTRAVENOUS at 18:49

## 2018-10-31 ASSESSMENT — COGNITIVE AND FUNCTIONAL STATUS - GENERAL
MOBILITY SCORE: 24
SUGGESTED CMS G CODE MODIFIER DAILY ACTIVITY: CH
DAILY ACTIVITIY SCORE: 24
SUGGESTED CMS G CODE MODIFIER MOBILITY: CH

## 2018-10-31 ASSESSMENT — PAIN SCALES - GENERAL
PAINLEVEL_OUTOF10: 5
PAINLEVEL_OUTOF10: 0
PAINLEVEL_OUTOF10: 6
PAINLEVEL_OUTOF10: 4
PAINLEVEL_OUTOF10: 7
PAINLEVEL_OUTOF10: 4
PAINLEVEL_OUTOF10: 4

## 2018-10-31 ASSESSMENT — PATIENT HEALTH QUESTIONNAIRE - PHQ9
SUM OF ALL RESPONSES TO PHQ9 QUESTIONS 1 AND 2: 0
2. FEELING DOWN, DEPRESSED, IRRITABLE, OR HOPELESS: NOT AT ALL
1. LITTLE INTEREST OR PLEASURE IN DOING THINGS: NOT AT ALL

## 2018-10-31 ASSESSMENT — LIFESTYLE VARIABLES: ALCOHOL_USE: NO

## 2018-10-31 NOTE — OP REPORT
Operative Report    Date: 10/31/2018    Surgeon: Joshua Douglass M.D.    Assistant: DON Santana    Anesthesiologist: Dr. Martino    Pre-operative Diagnosis: multinodular goiter     Post-operative Diagnosis: same     Procedure:   1. Total thyroidectomy  2. bilateral recurrent laryngeal nerve monitoring    Indications: This is a 38 year old female who presented to my office with a 4 cm bilateral thyroid nodule. I discussed risks of surgery including bleeding, infection, hypoparathyroidism, and recurrent laryngeal nerve injury, and she agreed to proceed.       Findings: Bilateral large thyroid nodules. The bilateral recurrent laryngeal nerves intact by NIM stimulation. The right superior, right inferior, left superior and left inferior parathyroid glands were seen and preserved.     Procedure in detail: The patient was identified in the pre-operative holding area and brought to the operating room. Correct side and site were identified.  GETA was smoothly induced. The patient was prepped and draped in the usual sterile fashion.    With the patient in the supine position, the head of the bed slightly elevated, the neck slightly extended, and the patient intubated with a NIM endotracheal tube, the precordial electrodes were placed by the surgical assistant, who then monitored the recurrent laryngeal nerves bilaterally throughout the procedure.     The neck was prepared with betadiene and draped in the usual fashion. The neck was entered through a lower transverse cervical incision and carried down through the platysma. Subplatysmal flaps were dissected superiorly and inferiorly down to the sternal notch. The midline cervical fascia was incised down to the capsule of the thyroid. The strap muscles were mobilized off the right thyroid lobe, and the superior pole vessels progressively divided with the Ligasure. The thyroid veins were divided with the Ligasure. The recurrent laryngeal nerve and both parathyroids were  identified and protected. Branches of the inferior thyroid artery were divided on the capsule of the gland with the Ligasure. Smaller vessels were either divided with the Ligasure or, when near to the recurrent nerve, divided between clamps and suture ligated with 3-0 Vicryl suture as the gland was dissected free from the nerve and off the trachea. The gland was transected at the medial border of the left thyroid lobe and the right thyroid lobe and the isthmus were sent for permanet pathologic exam.    The strap muscles were then mobilized off of the left thyroid lobe, and the superior pole vessels progressively divided with the Ligasure. The middle and inferior thyroid veins were divided with the Ligasure. The recurrent laryngeal nerve and both parathyroids were identified and protected. Branches of the inferior thyroid artery were divided on the capsule of the gland with the Ligasure. Smaller vessels were either divided with the Ligasure or, when close to the nerve, divided between clamps and suture ligated with 3-0 Vicryl as the gland was dissected free of the nerve and off of the trachea. The left  thyroid lobe was sent for permanent pathology. Good hemostasis was assured.     The integrity of the bilateral recurrent laryngeal nerves was documented. Small pieces of Surgicel Fibrillar were placed in  the neck. The midline cervical fascia was reapproximated with running 3-0 Vicryl. Platysma was reapproximated with interrupted 3-0 Vicryl. The skin was closed with a running subcuticular Prolene, and dressed with Dermabond. The Prolene was removed when the Dermabond dried.     The patient was awakened from general anesthetic, and was taken to the recovery room in stable condition.    Sponge and needle counts were correct at the end of the case.     Specimen:   1. Right thyroid lobe  2. Left thyroid lobe    EBL: 30 cc    [facility]  [facility]  [phone]

## 2018-11-01 VITALS
TEMPERATURE: 98.5 F | WEIGHT: 149.25 LBS | OXYGEN SATURATION: 98 % | DIASTOLIC BLOOD PRESSURE: 68 MMHG | SYSTOLIC BLOOD PRESSURE: 114 MMHG | RESPIRATION RATE: 17 BRPM | HEIGHT: 64 IN | HEART RATE: 62 BPM | BODY MASS INDEX: 25.48 KG/M2

## 2018-11-01 LAB
CALCIUM SERPL-MCNC: 8.9 MG/DL (ref 8.5–10.5)
PATHOLOGY CONSULT NOTE: NORMAL
PTH-INTACT SERPL-MCNC: 32.3 PG/ML (ref 14–72)

## 2018-11-01 PROCEDURE — 36415 COLL VENOUS BLD VENIPUNCTURE: CPT

## 2018-11-01 PROCEDURE — 83970 ASSAY OF PARATHORMONE: CPT

## 2018-11-01 PROCEDURE — 90471 IMMUNIZATION ADMIN: CPT

## 2018-11-01 PROCEDURE — 700111 HCHG RX REV CODE 636 W/ 250 OVERRIDE (IP): Performed by: SURGERY

## 2018-11-01 PROCEDURE — G0378 HOSPITAL OBSERVATION PER HR: HCPCS

## 2018-11-01 PROCEDURE — 90686 IIV4 VACC NO PRSV 0.5 ML IM: CPT | Performed by: SURGERY

## 2018-11-01 PROCEDURE — A9270 NON-COVERED ITEM OR SERVICE: HCPCS | Performed by: SURGERY

## 2018-11-01 PROCEDURE — 700102 HCHG RX REV CODE 250 W/ 637 OVERRIDE(OP): Performed by: SURGERY

## 2018-11-01 RX ORDER — LEVOTHYROXINE SODIUM 112 UG/1
112 TABLET ORAL
Qty: 30 TAB | Refills: 1 | Status: SHIPPED | OUTPATIENT
Start: 2018-11-12 | End: 2019-01-22

## 2018-11-01 RX ORDER — HYDROCODONE BITARTRATE AND ACETAMINOPHEN 5; 325 MG/1; MG/1
1-2 TABLET ORAL EVERY 6 HOURS PRN
Qty: 20 TAB | Refills: 0 | Status: SHIPPED | OUTPATIENT
Start: 2018-11-01 | End: 2018-11-06

## 2018-11-01 RX ADMIN — OMEPRAZOLE 40 MG: 20 CAPSULE, DELAYED RELEASE ORAL at 05:21

## 2018-11-01 RX ADMIN — ACETAMINOPHEN 1000 MG: 500 TABLET ORAL at 05:21

## 2018-11-01 RX ADMIN — ACETAMINOPHEN 1000 MG: 500 TABLET ORAL at 10:47

## 2018-11-01 RX ADMIN — INFLUENZA A VIRUS A/MICHIGAN/45/2015 X-275 (H1N1) ANTIGEN (FORMALDEHYDE INACTIVATED), INFLUENZA A VIRUS A/SINGAPORE/INFIMH-16-0019/2016 IVR-186 (H3N2) ANTIGEN (FORMALDEHYDE INACTIVATED), INFLUENZA B VIRUS B/PHUKET/3073/2013 ANTIGEN (FORMALDEHYDE INACTIVATED), AND INFLUENZA B VIRUS B/MARYLAND/15/2016 BX-69A ANTIGEN (FORMALDEHYDE INACTIVATED) 0.5 ML: 15; 15; 15; 15 INJECTION, SUSPENSION INTRAMUSCULAR at 10:42

## 2018-11-01 RX ADMIN — CALCIUM CARBONATE-CHOLECALCIFEROL TAB 250 MG-125 UNIT 2 TABLET: 250-125 TAB at 05:21

## 2018-11-01 RX ADMIN — CALCIUM CARBONATE-CHOLECALCIFEROL TAB 250 MG-125 UNIT 2 TABLET: 250-125 TAB at 10:42

## 2018-11-01 ASSESSMENT — PAIN SCALES - GENERAL
PAINLEVEL_OUTOF10: 4
PAINLEVEL_OUTOF10: 3

## 2018-11-01 NOTE — DISCHARGE SUMMARY
Discharge Summary      DATE OF ADMISSION: 10/31/2018    DATE OF DISCHARGE: 11/1/18    ADMISSION DIAGNOSIS (ES):  ? Toxic multinodular goiter    DISCHARGE DIAGNOSIS (ES):  ? same    DISCHARGE CONDITION:  ? good    CONSULTATIONS:  ? none    PROCEDURES:  ? Total thyroidectomy    BRIEF HPI:  ? 39 yo female with history of toxic multinodular goiter    HOSPITAL COURSE:  She underwent the above procedure and was admitted for routine post-operative care. She had an uneventful hospital course. By POD 1, she was ambulating well, tolerating a diet, and her pain was controlled. There was no sign of hypocalcemia.     MEDS:   Current Outpatient Prescriptions   Medication Sig Dispense Refill   • HYDROcodone-acetaminophen (NORCO) 5-325 MG Tab per tablet Take 1-2 Tabs by mouth every 6 hours as needed for up to 5 days. 20 Tab 0   • [START ON 11/12/2018] levothyroxine (SYNTHROID) 112 MCG Tab Take 1 Tab by mouth Every morning on an empty stomach. 30 Tab 1   • Calcium Carb-Cholecalciferol (OYSTER SHELL CALCIUM/VITAMIN D) 250-125 MG-UNIT Tab tablet Take 2 Tabs by mouth 3 times a day. 60 Tab 0       FOLLOW-UP:  ? Please call my office at [phone] to make an appointment in 1 week(s)    DISCHARGE INSTRUCTIONS:  Discharge instructions after thyroidectomy:    You had surgery called thyroidectomy. This means that part or all of your thyroid gland was removed. The main job of the thyroid gland is to make thyroid hormone. This hormone controls your body’s metabolism. This is the way your body creates and uses energy. Removing the thyroid gland removes your body’s source of thyroid hormone. So after the surgery, you will need to take thyroid hormone pills daily. This helps keep the level of thyroid hormone in your body steady. This sheet tells you more about how to care for yourself after surgery.    Recovering After Surgery:    Get plenty of rest.    Care for your incision as directed.    Avoid heavy lifting and strenuous activity for 2  weeks.    Walk a few times daily. But don’t push yourself too hard. Slowly increase your pace and distance, as you feel able.    Return to work when your doctor says it’s okay.    Pain Control:  Most patients do well after thyroid surgery. You may take ibuprofen or Tylenol as needed for pain. If you need stronger pain medication, call your doctor's office.     Taking Your Thyroid Medication:    Take your medication as directed.    Use a pillbox labeled with the days of the week. This will help you remember whether you’ve taken your medication each day.    Take your medication with a liquid,  avoid taking it with soy milk. Soy milk can affect how well your body absorbs the medication. The pill needs to reach your stomach and not dissolve in your throat.    Try to take your medication with the same types and amounts of food and liquid each day. This helps control the amount of thyroid hormone in your system.    After taking your medication:    Wait 4 hours before eating or drinking anything that contains soy.    Wait 4 hours before taking certain medications. These include:     Iron supplements     Calcium supplements     Antacids that contain either calcium or aluminum hydroxide     Medications that lower your cholesterol    Do not stop taking your medication even if you become pregnant.    Never stop taking medications on your own.    Keeping Your Doctor’s Appointments:    See your doctor for regular visits. These are needed to monitor your health.    Have routine blood tests done. These check the level of thyroid hormone in your body. This helps your doctor know whether to adjust the dosage of your medication if needed.    Tell your doctor about any signs of further thyroid problems.    Signs that you may have too much thyroid hormone in your body include:     Restlessness     Rapid weight loss     Sweating     Signs that you may have too little thyroid hormone in your body include:     Fatigue or  sluggishness     Puffy hands, face, or feet     Hoarseness     Muscle pain     Slow pulse (less than 60 beats per minute)    When to Call Your Doctor:  Call your doctor right away if you have any of the following:  Fever above 101°F  Swelling or bleeding at the incision site  Choking  Trouble breathing  A sore throat that lasts longer than 7 days  Tingling or cramps in your hands, feet, or lips    Calcium Supplementation  After thyroidectomy, you may have low calcium levels. Symptoms of low calcium levels include tingling or numbness in the fingers or lips, or muscle spasms.  To prevent low calcium levels, you should take Citracal Max. The usual dosage is 2 tablets three times daily. You may buy this OTC. If you have symptoms of low calcium, take two extra tablets and call your doctor's office.     Your doctor may also prescribe Rocaltrol to help with your calcium levels. Take this as prescribed.    FOLLOW-UP:  ? Please call my office at [phone] to make an appointment in 1 week    Office address:  [place] [address]    Joshua Douglass M.D.  [facility]  [phone]

## 2018-11-01 NOTE — PROGRESS NOTES
Assumed care at 1900    Received report from day shift RN.    Reviewed recent lab results, notes, orders, and MAR  POC discussed and updated on care board  Bed is in the lowest and locked position, call light within reach

## 2018-11-01 NOTE — PROGRESS NOTES
Patient requested tylenol prior to discharge.  Dose given and patient instructed to not take any additional tylenol until at least 1900.  Patient verbalized understanding.

## 2018-11-01 NOTE — DISCHARGE INSTRUCTIONS
Discharge Instructions    Discharged to home by car with relative. Discharged via wheelchair, hospital escort: Yes.  Special equipment needed: Not Applicable    Be sure to schedule a follow-up appointment with your primary care doctor or any specialists as instructed.     Discharge Plan:   Diet Plan: Discussed  Activity Level: Discussed  Confirmed Follow up Appointment: Patient to Call and Schedule Appointment  Confirmed Symptoms Management: Discussed  Medication Reconciliation Updated: Yes  Pneumococcal Vaccine Administered/Refused: Not given - Patient refused pneumococcal vaccine  Influenza Vaccine Indication: Indicated: 9 to 64 years of age  Influenza Vaccine Given - only chart on this line when given: Influenza Vaccine Given (See MAR)    I understand that a diet low in cholesterol, fat, and sodium is recommended for good health. Unless I have been given specific instructions below for another diet, I accept this instruction as my diet prescription.   Other diet: as tolerated    Special Instructions: None    · Is patient discharged on Warfarin / Coumadin?   No     Depression / Suicide Risk    As you are discharged from this [facility] facility, it is important to learn how to keep safe from harming yourself.    Recognize the warning signs:  · Abrupt changes in personality, positive or negative- including increase in energy   · Giving away possessions  · Change in eating patterns- significant weight changes-  positive or negative  · Change in sleeping patterns- unable to sleep or sleeping all the time   · Unwillingness or inability to communicate  · Depression  · Unusual sadness, discouragement and loneliness  · Talk of wanting to die  · Neglect of personal appearance   · Rebelliousness- reckless behavior  · Withdrawal from people/activities they love  · Confusion- inability to concentrate     If you or a loved one observes any of these behaviors or has concerns about self-harm, here's what you can do:  · Talk  about it- your feelings and reasons for harming yourself  · Remove any means that you might use to hurt yourself (examples: pills, rope, extension cords, firearm)  · Get professional help from the community (Mental Health, Substance Abuse, psychological counseling)  · Do not be alone:Call your Safe Contact- someone whom you trust who will be there for you.  · Call your local CRISIS HOTLINE 500-5369 or 505-484-4648  · Call your local Children's Mobile Crisis Response Team [place] (123) 157-5135 or wwwBeam.  · Call the toll free National Suicide Prevention Hotlines   · National Suicide Prevention Lifeline 004-468-GQPG (4945)  · National Hope Line Network 800-SUICIDE (809-2617)          Thyroidectomy, Care After  Refer to this sheet in the next few weeks. These instructions provide you with information about caring for yourself after your procedure. Your health care provider may also give you more specific instructions. Your treatment has been planned according to current medical practices, but problems sometimes occur. Call your health care provider if you have any problems or questions after your procedure.  WHAT TO EXPECT AFTER THE PROCEDURE  After your procedure, it is typical to have:  · Mild pain in the neck or upper body, especially when swallowing.  · A sore throat.  · A weak voice.  HOME CARE INSTRUCTIONS   · Take medicines only as directed by your health care provider.  · If your entire thyroid gland was removed, you may need to take thyroid hormone medicine from now on.  · Do not take medicines that contain aspirin and ibuprofen until your health care provider says that you can. These medicines can increase your risk of bleeding.  · Some pain medicines cause constipation. Drink enough fluid to keep your urine clear or pale yellow. This can help to prevent constipation.  · Start slowly with eating. You may need to have only liquids and soft foods for a few days or as directed by your health  care provider.  · Do not take baths, swim, or use a hot tub until your health care provider approves.  · There are many different ways to close and cover an incision, including stitches (sutures), skin glue, and adhesive strips. Follow your health care provider's instructions for:  ¨ Incision care.  ¨ Bandage (dressing) changes and removal.  ¨ Incision closure removal.  · Resume your usual activities as directed by your health care provider.  · For the first 10 days after the procedure or as instructed by your health care provider:  ¨ Do not lift anything heavier than 20 lb (9.1 kg).  ¨ Do not jog, swim, or do other strenuous exercises.  ¨ Do not play contact sports.  · Keep all follow-up visits as directed by your health care provider. This is important.  SEEK MEDICAL CARE IF:  · The soreness in your throat gets worse.  · You have increased pain at your incision or incisions.  · You have increased bleeding from an incision.  · Your incision becomes infected. Watch for:  ¨ Swelling.  ¨ Redness.  ¨ Warmth.  ¨ Pus.  · You notice a bad smell coming from an incision or dressing.  · You have a fever.  · You feel lightheaded or faint.  · You have numbness, tingling, or muscle spasms in your:  ¨ Arms.  ¨ Hands.  ¨ Feet.  ¨ Face.  · You have trouble swallowing.  SEEK IMMEDIATE MEDICAL CARE IF:   · You develop a rash.  · You have difficulty breathing.  · You hear whistling noises coming from your chest.  · You develop a cough that gets worse.  · Your speech changes, or you have hoarseness that gets worse.     This information is not intended to replace advice given to you by your health care provider. Make sure you discuss any questions you have with your health care provider.     Document Released: 07/07/2006 Document Revised: 01/08/2016 Document Reviewed: 05/20/2015  [employer] Interactive Patient Education ©2016 [employer]    Hypocalcemia, Adult  Introduction  Hypocalcemia is when the level of calcium in a person's blood  is below normal. Calcium is a mineral that is used by the body in many ways. A lack of blood calcium can affect the heart and muscles, make the bones more likely to break, and cause other problems.  What are the causes?  This condition may be caused by:  · Decreased production (hypoparathyroidism) or improper use of parathyroid hormone.  · Problems with the parathyroid glands or surgical removal of these glands.  · Problems with parathyroid function after removal of the thyroid gland.  · Lack (deficiency) of vitamin D or magnesium or both.  · Kidney problems.  Less common causes include:  · Intestinal problems that interfere with nutrient absorption.  · Alcoholism.  · Low levels of a body protein that is called albumin.  · Inflammation of the pancreas (pancreatitis).  · Certain medicines.  · Severe infections (sepsis).  · Certain diseases, such as sarcoidosis or hemochromatosis, that cause the parathyroid glands to be filled with cells or substances that are not normally present.  · Breakdown of large amounts of muscle fiber.  · High levels of phosphate in the body.  · Cancer.  · Massive blood transfusions, which usually occur with severe trauma.  What are the signs or symptoms?  Symptoms of this condition include:  · Numbness and tingling in the fingers, toes, or around the mouth.  · Muscle aches or cramps, especially in the legs, feet, and back.  · Muscle twitches.  · Abdominal cramping or pain.  · Memory problems, confusion, or difficulty thinking.  · Depression, anxiety, irritability, or changes in personality.  · Fainting.  · Chest pain.  · Difficulty swallowing.  · Changes in the sound of the voice.  · Shortness of breath or wheezing.  · General weakness and fatigue.  Symptoms of severe hypocalcemia include:  · Shaking uncontrollably (seizures).  · Seizure of the voice box (laryngospasm).  · Fast heartbeats (palpitations) and abnormal heart rhythms (arrhythmias).  Long-term symptoms of this condition  include:  · Coarse, brittle hair and nails.  · Dry skin or lasting (chronic) skin diseases (psoriasis, eczema,, or dermatitis).  · Clouding of the eye lens (cataracts).  How is this diagnosed?  This condition is usually diagnosed with a blood test. You may also have other tests to help determine the underlying cause of the condition. For example, a test may be done that records the electrical activity of the heart (electrocardiogram,or ECG).  How is this treated?  Treatment for this condition may include:  · Calcium given by mouth (orally) or given through an IV tube that is inserted into one of your veins. The method used for giving calcium will depend on the severity of the condition.  · Other minerals (electrolytes), such as magnesium.  Other treatment will depend on the cause of the condition.  Follow these instructions at home:  · Follow diet instructions from your health care provider or dietitian.  · Take supplements only as told by your health care provider.  · Keep all follow-up visits as told by your health care provider. This is important.  Contact a health care provider if:  · You have increased fatigue.  · You have increased muscle twitching.  · You have new swelling in the feet, ankles, or legs.  · You develop changes in mood, memory, or personality.  Get help right away if:  · You have chest pain.  · You have persistent rapid or irregular heartbeats.  · You have difficulty breathing.  · You faint.  · You start to have seizures.  · You have confusion.  This information is not intended to replace advice given to you by your health care provider. Make sure you discuss any questions you have with your health care provider.  Document Released: 06/07/2011 Document Revised: 05/25/2017 Document Reviewed: 05/04/2016  © 2017 Elsevier

## 2018-11-01 NOTE — CARE PLAN
Problem: Safety  Goal: Will remain free from injury  Outcome: PROGRESSING AS EXPECTED  Patient oriented to room, call light within reach, patient assessed for steadiness during ambulation.  Fall precautions in place.      Problem: Pain Management  Goal: Pain level will decrease to patient's comfort goal  Outcome: PROGRESSING AS EXPECTED  Patient's pain well controlled with medication per MAR

## 2018-11-01 NOTE — PROGRESS NOTES
Patient arrived to unit via wheelchair, ambulated with standby assist to bed.     Anterior neck incision is well approximated with dermabond and MARIA ISABEL, no drainage.   Ice pack in place.  Declines need for pain medication rating pain 6/10 upon arrival. Medicated with scheduled tylenol.   PIV SL.   Significant other at bedside.   Reports nausea upon admission. Medicated.   Tolerating full liquid diet.    Educated on admission including: unit policies, white board, TV system, call light, call before fall, plan of care, ambulation expectations, and up to chair for all meals expectation if possible.    Call light and belongings within reach.  All questions answered.

## 2018-11-01 NOTE — OR NURSING
1631 to pacu connected to all monitoring equipment report recvd from dr alvarez and rn pt remains asleep still with oral airway in place   1640 pt awake oral airway removed breathing even unlabored o2 switched to nasal cannula 2 iters sats 98%   1700 - medicated iv and po pain meds for c/o pain   1757 report called and pt transported to [facility] room t427-1 with all belongings accomp with sig other and transport in wheelchair

## 2018-11-01 NOTE — OR NURSING
1631 to pacu connected to all monitoring equipment report recvd from dr alvarez and rn pt remains asleep still with oral airway in place   1640 pt awake oral airway removed breathing even unlabored o2 switched to nasal cannula 2 iters sats 98%   1700 - medicated iv and po pain meds for c/o pain   1730- report called and transport ordered  Sig other brought to bedside   1746 transported to room with all belongings

## 2018-11-01 NOTE — PROGRESS NOTES
AO x 4.  Ride present for discharge.  Up to restroom to void, steady gait.  Anterior neck incision well approximated with dermabond.  No swelling or redness.  No difficulty breathing or swallowing.  Declined pain medication for 3/10 neck pain.  Plan of care discussed, questions answered, verbalized understanding.

## 2018-11-01 NOTE — PROGRESS NOTES
Discharging Patient home per physician order.  Discharged with significant other.  Demonstrated understanding of discharge instructions, follow up appointments, home medications, prescriptions, home care for surgical wound, and nursing care instructions for thyroidectomy and hypocalcemia.  Ambulating without assistance, voiding without difficulty, pain well controlled, tolerating oral medications, oxygen saturation greater than 90% , tolerating diet.   Denies tingling. Educational handouts given and discussed.  Discussed typelnol and pain mediations with tylenol and not to exceed 4,000mg in 24 hours. Verbalized understanding of discharge instructions and educational handouts.  All questions answered.  Patient able to state several reasons why to return to the ED, seek medical attention, or call MD.  Belongings, scripts, discharge papers, and Is with patient at time of discharge.

## 2018-11-17 ENCOUNTER — TELEPHONE (OUTPATIENT)
Dept: ENDOCRINOLOGY | Facility: MEDICAL CENTER | Age: 38
End: 2018-11-17

## 2018-11-17 NOTE — TELEPHONE ENCOUNTER
Telephone conversation with patient    Patient's thyroidectomy went well.  Pathology is benign.  She is taking Synthroid 112 mcg daily and is feeling well.  No symptoms of hypocalcemia.  Dr. Douglass plans to check her lab again shortly.  I will see her again in 2 months to make sure everything is in order and release her back to [facility].    Sebastian Soler M.D.

## 2018-12-30 RX ORDER — MINOCYCLINE HYDROCHLORIDE 100 MG/1
TABLET ORAL
Qty: 180 TAB | Refills: 1 | Status: SHIPPED | OUTPATIENT
Start: 2018-12-30 | End: 2019-08-07 | Stop reason: SDUPTHER

## 2019-01-04 DIAGNOSIS — E89.0 HYPOTHYROIDISM, POSTSURGICAL: Primary | ICD-10-CM

## 2019-01-04 RX ORDER — LEVOTHYROXINE SODIUM 112 MCG
112 TABLET ORAL
Qty: 30 TAB | Refills: 2 | Status: SHIPPED | OUTPATIENT
Start: 2019-01-04 | End: 2019-04-24 | Stop reason: SDUPTHER

## 2019-01-22 ENCOUNTER — OFFICE VISIT (OUTPATIENT)
Dept: ENDOCRINOLOGY | Facility: MEDICAL CENTER | Age: 39
End: 2019-01-22
Payer: COMMERCIAL

## 2019-01-22 VITALS
SYSTOLIC BLOOD PRESSURE: 130 MMHG | HEART RATE: 95 BPM | BODY MASS INDEX: 26.63 KG/M2 | WEIGHT: 156 LBS | HEIGHT: 64 IN | OXYGEN SATURATION: 99 % | DIASTOLIC BLOOD PRESSURE: 85 MMHG

## 2019-01-22 DIAGNOSIS — E89.0 HYPOTHYROIDISM, POSTSURGICAL: ICD-10-CM

## 2019-01-22 DIAGNOSIS — E83.51 HYPOCALCEMIA: ICD-10-CM

## 2019-01-22 PROCEDURE — 99213 OFFICE O/P EST LOW 20 MIN: CPT | Performed by: INTERNAL MEDICINE

## 2019-01-22 NOTE — PROGRESS NOTES
"     HPI:    Hypothyroidism, postsurgical            Thyroidectomy October 2018.  Pathology consistent with hyperplastic nodules consistent with her thyrotoxicosis.  She has done very well since surgery taking Synthroid 112 mcg daily.  She has difficulty taking calcium 3 times a day but I do not think that is going to be very important.  Once or twice a day is okay.             Wound  healed well.  No difficulties there    ROS:  All other systems reported as negative or unchanged since last exam      Allergies:   Allergies   Allergen Reactions   • Ativan      Hallucinations, restless   • Compazine Palpitations     Hypertension   • Prochlorperazine Palpitations     \"Compazine\"; severe HTN   • Tape Rash     Paper tape and tegaderm is okay.       Current medicines including changes today:  Current Outpatient Prescriptions   Medication Sig Dispense Refill   • SYNTHROID 112 MCG Tab Take 1 Tab by mouth Every morning on an empty stomach. 30 Tab 2   • minocycline (DYNACIN) 100 MG tablet TAKE ONE TABLET BY MOUTH TWICE A  Tab 1   • Calcium Carb-Cholecalciferol (OYSTER SHELL CALCIUM/VITAMIN D) 250-125 MG-UNIT Tab tablet Take 2 Tabs by mouth 3 times a day. 60 Tab 0   • hydrocortisone (CORTEF) 10 MG Tab Take 1 Tab by mouth 2 times a day. 60 Tab 2   • SUMAtriptan (IMITREX) 50 MG Tab TAKE ONE TABLET BY MOUTH ONE TIME AS NEEDED FOR MIGRAINE; MAY REPEAT ONE TABLET IN 2 HOURS IF HEADACHE PERSISTS. MAX OF 2 TABLETS A DAY 9 Tab 4   • Ibuprofen (ADVIL) 200 MG CAPS Take 4 Caps by mouth as needed. Indications: Mild to Moderate Pain     • omeprazole (PRILOSEC) 40 MG delayed-release capsule Take 40 mg by mouth every day.     • levothyroxine (SYNTHROID) 112 MCG Tab Take 1 Tab by mouth Every morning on an empty stomach. 30 Tab 1     No current facility-administered medications for this visit.         Past Medical History:   Diagnosis Date   • Adrenal insufficiency (HCC) 2014    21-hydroxylase antibody = neg   • Anxiety     in remission " "  • Blood transfusion, without reported diagnosis 8/2013   • Chronic erosive gastritis 8/14    Dr Mercedes   • Depression     in remission   • GERD (gastroesophageal reflux disease)     omeprazole   • Headache, classical migraine 11/2014    since 8yoa, triggers=unk, imitrex works well.  freq=3-4x/month.     • Heart burn    • Hypercalcemia 2014   • Hypothyroidism, postsurgical    • IgA gammopathy 2014   • Indigestion    • Kidney disease     hx of pyelo, outpat tx   • Mixed anxiety and depressive disorder 2014   • Multiple thyroid nodules 7/31/2017   • Opioid dependence (HCC) 2014    hx of heavy use of dilaudid   • Ovarian cyst    • Pain 05-08-15    abd., rectum, 3/10   • Pancreatitis 2014   • S/P total colectomy 2014   • Thyrotoxicosis 2015    hyper, on methimazole, sees endo   • Type II or unspecified type diabetes mellitus without mention of complication, not stated as uncontrolled 2/2014    NIDDM, sees endo.  no GDM.  diet controlled    • Ulcerative colitis, chronic (HCC)        PHYSICAL EXAM:    /85 (BP Location: Right arm, Patient Position: Sitting)   Pulse 95   Ht 1.626 m (5' 4\")   Wt 70.8 kg (156 lb)   SpO2 99%   BMI 26.78 kg/m²   Heart rate during my examination is 80 and regular    Gen.   appears healthy     Skin   appropriate for sex and age    HEENT  unremarkable    Neck   surgical wound has healed nicely.  No palpable residual thyroid or nodules.    Heart  regular    Extremities  no edema    Neuro  gait and station normal, no tremor    Psych  appropriate, calm, pleasant    ASSESSMENT AND RECOMMENDATIONS      Hypothyroidism  post thyroidectomy for thyrotoxicosis             Doing well taking Synthroid 112 mcg daily.  She knows how to take it correctly.              Clinically euthyroid             Update lab and adjust dose if necessary              DISPOSITION: Follow-up lab results by phone or my chart.                           I will see her again in 3 months to check up on thyroid status " and continue to monitor adrenal insufficiency.      Sebastian Soler M.D.    Copies to: [facility]agoner.P.R.N. [phone]

## 2019-02-01 ENCOUNTER — HOSPITAL ENCOUNTER (OUTPATIENT)
Dept: LAB | Facility: MEDICAL CENTER | Age: 39
End: 2019-02-01
Attending: INTERNAL MEDICINE
Payer: COMMERCIAL

## 2019-02-01 DIAGNOSIS — E89.0 HYPOTHYROIDISM, POSTSURGICAL: ICD-10-CM

## 2019-02-01 DIAGNOSIS — E83.51 HYPOCALCEMIA: ICD-10-CM

## 2019-02-01 LAB
ALBUMIN SERPL BCP-MCNC: 3.9 G/DL (ref 3.2–4.9)
CALCIUM SERPL-MCNC: 9.2 MG/DL (ref 8.5–10.5)
T4 FREE SERPL-MCNC: 1.11 NG/DL (ref 0.53–1.43)
TSH SERPL DL<=0.005 MIU/L-ACNC: 2.25 UIU/ML (ref 0.38–5.33)

## 2019-02-01 PROCEDURE — 84439 ASSAY OF FREE THYROXINE: CPT

## 2019-02-01 PROCEDURE — 84443 ASSAY THYROID STIM HORMONE: CPT

## 2019-02-01 PROCEDURE — 82310 ASSAY OF CALCIUM: CPT

## 2019-02-01 PROCEDURE — 82040 ASSAY OF SERUM ALBUMIN: CPT

## 2019-02-01 PROCEDURE — 36415 COLL VENOUS BLD VENIPUNCTURE: CPT

## 2019-03-17 DIAGNOSIS — E27.40 ADRENAL INSUFFICIENCY (HCC): ICD-10-CM

## 2019-03-18 RX ORDER — HYDROCORTISONE 10 MG/1
TABLET ORAL
Qty: 60 TAB | Refills: 1 | Status: SHIPPED | OUTPATIENT
Start: 2019-03-18 | End: 2019-07-07 | Stop reason: SDUPTHER

## 2019-03-18 NOTE — TELEPHONE ENCOUNTER
Was the patient seen in the last year in this department? Yes  LOV 1/22/19 [place]  Next appt 6/19/19 [place]    Does patient have an active prescription for medications requested? Yes    Received Request Via: Pharmacy

## 2019-04-24 DIAGNOSIS — E89.0 HYPOTHYROIDISM, POSTSURGICAL: ICD-10-CM

## 2019-04-25 RX ORDER — LEVOTHYROXINE SODIUM 112 MCG
TABLET ORAL
Qty: 30 TAB | Refills: 5 | Status: SHIPPED | OUTPATIENT
Start: 2019-04-25 | End: 2019-06-19

## 2019-04-25 NOTE — TELEPHONE ENCOUNTER
Was the patient seen in the last year in this department? Yes 01/22/19    Does patient have an active prescription for medications requested? No     Received Request Via: Pharmacy     SYNTHROID 112 MCG Tab  TAKE ONE TABLET BY MOUTH EVERY MORNING ON AN EMPTY STOMACH.

## 2019-05-30 DIAGNOSIS — G43.109 MIGRAINE WITH AURA AND WITHOUT STATUS MIGRAINOSUS, NOT INTRACTABLE: ICD-10-CM

## 2019-05-31 RX ORDER — SUMATRIPTAN 50 MG/1
TABLET, FILM COATED ORAL
Qty: 9 TAB | Refills: 11 | Status: SHIPPED | OUTPATIENT
Start: 2019-05-31

## 2019-06-19 ENCOUNTER — OFFICE VISIT (OUTPATIENT)
Dept: ENDOCRINOLOGY | Facility: MEDICAL CENTER | Age: 39
End: 2019-06-19
Payer: COMMERCIAL

## 2019-06-19 VITALS
WEIGHT: 155.6 LBS | BODY MASS INDEX: 26.56 KG/M2 | HEIGHT: 64 IN | HEART RATE: 94 BPM | DIASTOLIC BLOOD PRESSURE: 82 MMHG | SYSTOLIC BLOOD PRESSURE: 116 MMHG | OXYGEN SATURATION: 98 %

## 2019-06-19 DIAGNOSIS — E89.0 HYPOTHYROIDISM, POSTSURGICAL: ICD-10-CM

## 2019-06-19 DIAGNOSIS — E27.40 ADRENAL INSUFFICIENCY (HCC): ICD-10-CM

## 2019-06-19 PROCEDURE — 99213 OFFICE O/P EST LOW 20 MIN: CPT | Performed by: INTERNAL MEDICINE

## 2019-06-19 RX ORDER — LEVOTHYROXINE SODIUM 0.12 MG/1
125 TABLET ORAL
Qty: 60 TAB | Refills: 3 | Status: SHIPPED
Start: 2019-06-19 | End: 2020-01-08

## 2019-06-20 NOTE — PROGRESS NOTES
"     HPI:    See assessment and recommendations below    ROS:  Generally feeling well except for fatigue and weight gain since surgery      Allergies:   Allergies   Allergen Reactions   • Ativan      Hallucinations, restless   • Compazine Palpitations     Hypertension   • Prochlorperazine Palpitations     \"Compazine\"; severe HTN   • Tape Rash     Paper tape and tegaderm is okay.       Current medicines including changes today:  Current Outpatient Prescriptions   Medication Sig Dispense Refill   • levothyroxine (SYNTHROID) 125 MCG Tab Take 1 Tab by mouth Every morning on an empty stomach. 60 Tab 3   • hydrocortisone (CORTEF) 10 MG Tab TAKE 1 TABLET BY MOUTH TWO TIMES A DAY 60 Tab 1   • minocycline (DYNACIN) 100 MG tablet TAKE ONE TABLET BY MOUTH TWICE A  Tab 1   • Calcium Carb-Cholecalciferol (OYSTER SHELL CALCIUM/VITAMIN D) 250-125 MG-UNIT Tab tablet Take 2 Tabs by mouth 3 times a day. 60 Tab 0   • Ibuprofen (ADVIL) 200 MG CAPS Take 4 Caps by mouth as needed. Indications: Mild to Moderate Pain     • omeprazole (PRILOSEC) 40 MG delayed-release capsule Take 40 mg by mouth every day.     • SUMAtriptan (IMITREX) 50 MG Tab TAKE ONE TABLET BY MOUTH ONE TIME AS NEEDED FOR MIGRAINE; MAY REPEAT ONE TABLET IN 2 HOURS IF HEADACHE PERSISTS. MAX OF 2 TABLETS A DAY 9 Tab 11     No current facility-administered medications for this visit.         Past Medical History:   Diagnosis Date   • Adrenal insufficiency (HCC) 2014    21-hydroxylase antibody = neg   • Anxiety     in remission   • Blood transfusion, without reported diagnosis 8/2013   • Chronic erosive gastritis 8/14    Dr Mercedes   • Depression     in remission   • GERD (gastroesophageal reflux disease)     omeprazole   • Headache, classical migraine 11/2014    since 8yoa, triggers=unk, imitrex works well.  freq=3-4x/month.     • Heart burn    • Hypercalcemia 2014   • Hypothyroidism, postsurgical    • IgA gammopathy 2014   • Indigestion    • Kidney disease     hx of " "cristina, outpat tx   • Mixed anxiety and depressive disorder 2014   • Multiple thyroid nodules 7/31/2017   • Opioid dependence (HCC) 2014    hx of heavy use of dilaudid   • Ovarian cyst    • Pain 05-08-15    abd., rectum, 3/10   • Pancreatitis 2014   • S/P total colectomy 2014   • Thyrotoxicosis 2015    hyper, on methimazole, sees endo   • Type II or unspecified type diabetes mellitus without mention of complication, not stated as uncontrolled 2/2014    NIDDM, sees endo.  no GDM.  diet controlled    • Ulcerative colitis, chronic (HCC)        PHYSICAL EXAM:    /82 (BP Location: Left arm, Patient Position: Sitting, BP Cuff Size: Adult)   Pulse 94   Ht 1.626 m (5' 4.02\")   Wt 70.6 kg (155 lb 9.6 oz)   SpO2 98%   BMI 26.70 kg/m²     Gen.   appears healthy     Skin   appropriate for sex and age            May be developing an addisonian tan.  I think she is getting darker    HEENT  unremarkable    Neck   surgical wound is well-healed.  No palpable thyroid    Heart  regular    Extremities  no edema    Neuro  gait and station normal    Psych  appropriate    ASSESSMENT AND RECOMMENDATIONS    1. Hypothyroidism, postsurgical            Question of appropriate replacement dose.  I think she is a little bit hypothyroid causing fatigue and weight gain           Plan is to increase levothyroxine up to 125 mcg/day and reassess in 3 months  - FREE THYROXINE; Future  - TSH; Future  - levothyroxine (SYNTHROID) 125 MCG Tab;     2. Adrenal insufficiency (HCC)            Apparently well controlled  - Basic Metabolic Panel; Future      DISPOSITION: Return in 3 months and review      Sebastian Soler M.D.    Copies to: [facility].P.R.N. [phone]  "

## 2019-07-07 DIAGNOSIS — E27.40 ADRENAL INSUFFICIENCY (HCC): ICD-10-CM

## 2019-07-08 RX ORDER — HYDROCORTISONE 10 MG/1
TABLET ORAL
Qty: 60 TAB | Refills: 5 | Status: SHIPPED | OUTPATIENT
Start: 2019-07-08 | End: 2020-07-06

## 2019-07-09 NOTE — TELEPHONE ENCOUNTER
Was the patient seen in the last year in this department? Yes 2 weeks ago    Does patient have an active prescription for medications requested? No     Received Request Via: Pharmacy     hydrocortisone (CORTEF) 10 MG Tab  TAKE ONE TABLET BY MOUTH TWICE A DAY

## 2019-08-08 RX ORDER — MINOCYCLINE HYDROCHLORIDE 100 MG/1
TABLET ORAL
Qty: 60 TAB | Refills: 2 | Status: SHIPPED | OUTPATIENT
Start: 2019-08-08 | End: 2019-11-23 | Stop reason: SDUPTHER

## 2019-08-13 ENCOUNTER — OFFICE VISIT (OUTPATIENT)
Dept: URGENT CARE | Facility: PHYSICIAN GROUP | Age: 39
End: 2019-08-13
Payer: COMMERCIAL

## 2019-08-13 VITALS
BODY MASS INDEX: 26.46 KG/M2 | HEIGHT: 64 IN | WEIGHT: 155 LBS | TEMPERATURE: 99 F | DIASTOLIC BLOOD PRESSURE: 62 MMHG | OXYGEN SATURATION: 97 % | HEART RATE: 84 BPM | SYSTOLIC BLOOD PRESSURE: 106 MMHG

## 2019-08-13 DIAGNOSIS — M54.31 SCIATICA OF RIGHT SIDE: ICD-10-CM

## 2019-08-13 PROCEDURE — 99214 OFFICE O/P EST MOD 30 MIN: CPT | Performed by: FAMILY MEDICINE

## 2019-08-13 RX ORDER — PREDNISONE 20 MG/1
40 TABLET ORAL DAILY
Qty: 10 TAB | Refills: 0 | Status: SHIPPED | OUTPATIENT
Start: 2019-08-13 | End: 2019-08-18

## 2019-08-13 ASSESSMENT — ENCOUNTER SYMPTOMS
EYE REDNESS: 0
SENSORY CHANGE: 0
EYE DISCHARGE: 0
FLANK PAIN: 0
WEIGHT LOSS: 0
FOCAL WEAKNESS: 0

## 2019-08-13 NOTE — PROGRESS NOTES
"Subjective:      Gala Camarena is a 39 y.o. female who presents with Leg Pain (x4days poss sciatica)            Onset 8-9 months ago right sciatic pain. Resolved for about 2 months and then started again yesterday. Pain severity 6/10 currently. Some relief with ibuprofen. Sitting and standing still make the pain worse. No trauma. No fever. No PMH cancer or unwanted weight loss. No myelopathy. No other aggravating or alleviating factors.        Review of Systems   Constitutional: Negative for malaise/fatigue and weight loss.   Eyes: Negative for discharge and redness.   Genitourinary: Negative for flank pain and hematuria.   Skin: Negative for itching and rash.   Neurological: Negative for sensory change and focal weakness.     .  Medications, Allergies, and current problem list reviewed today in Epic       Objective:     /62   Pulse 84   Temp 37.2 °C (99 °F) (Temporal)   Ht 1.626 m (5' 4\")   Wt 70.3 kg (155 lb)   SpO2 97%   BMI 26.61 kg/m²      Physical Exam   Constitutional: She is oriented to person, place, and time. She appears well-developed and well-nourished. No distress.   HENT:   Head: Normocephalic and atraumatic.   Musculoskeletal:        Legs:  Neurological: She is alert and oriented to person, place, and time. She displays normal reflexes.   Bilateral lower extremity strength and sensory intact.  +straight leg raise.     Skin: Skin is warm and dry. No rash noted.               Assessment/Plan:     1. Sciatica of right side  REFERRAL TO PHYSICAL THERAPY Reason for Therapy: Eval/Treat/Report    REFERRAL TO PHYSIATRY (PMR)    predniSONE (DELTASONE) 20 MG Tab     Differential diagnosis, natural history, supportive care, and indications for immediate follow-up discussed at length.     "

## 2019-08-13 NOTE — LETTER
August 13, 2019         Patient: Gala Camarena   YOB: 1980   Date of Visit: 8/13/2019           To Whom it May Concern:    Gala Camarena was seen in my clinic on 8/13/2019. Please excuse 8/16 and 8/17/2019.    Sincerely,           Robert Headley M.D.  Electronically Signed

## 2019-08-14 ENCOUNTER — TELEPHONE (OUTPATIENT)
Dept: ENDOCRINOLOGY | Facility: MEDICAL CENTER | Age: 39
End: 2019-08-14

## 2019-08-14 NOTE — TELEPHONE ENCOUNTER
1. Caller Name: Gala Camarena                                         Call Back Number: [phone]      Patient approves a detailed voicemail message: yes    Pt called stating that she was seen at  8/13/19 for Sciatica and was rx Prednisone 40 mg qd x5 days. The provider she saw was not sure if she could take it for two weeks. She is wondering if she is ok to take it and if she can will you extend it for the full 2 weeks that she is supposed to take it for?

## 2019-08-26 ENCOUNTER — TELEPHONE (OUTPATIENT)
Dept: ENDOCRINOLOGY | Facility: MEDICAL CENTER | Age: 39
End: 2019-08-26

## 2019-08-26 DIAGNOSIS — M54.30 SCIATICA, UNSPECIFIED LATERALITY: ICD-10-CM

## 2019-08-26 RX ORDER — PREDNISONE 20 MG/1
TABLET ORAL
Qty: 10 TAB | Refills: 0 | Status: SHIPPED | OUTPATIENT
Start: 2019-08-26 | End: 2020-08-28

## 2019-08-26 NOTE — TELEPHONE ENCOUNTER
"Telephone conversation with patient.  After we discussed using prednisone for pain control I quizzed her some about diabetes.  She does not take any medication for diabetes and tests her sugars infrequently and they are always \"good\".  I instructed her to test 3 times a day while she is taking prednisone and alert PCP if her sugars are up above 200.  I expect the prednisone to be temporary but were not sure where her diabetes is right now.  She needs to test regularly and keep a list.    Sebastian Soler M.D.  "

## 2019-08-26 NOTE — PROGRESS NOTES
Telephone conversation with patient    The patient is still taking hydrocortisone 10 mg a.m. 5 mg p.m.  The plan was to weari her off when other medical conditions were stabilized.  However, in the meantime she developed very painful sciatica and was seen in urgent care and given prednisone 40 mg for 5 days.  This helped with the pain.  She also was given referrals to physical therapy and a pain management specialist.  No x-rays or studies.  She was told to consult me to make sure it is okay for her to take prednisone.  It is okay but she does not have a diagnosis as yet.  She cannot get in with the other specialists right away and has not tried to get in with her PCP.  I recommended that she does go through her PCP to make sure she is getting the proper referrals and arrangements.  In the meantime I will give her prednisone 20 mg a day for the next 10 days for relief until she can get some other management organized.  She understands that she is being treated for a condition without a diagnosis as yet..    Sebastian Soler M.D.

## 2019-09-18 ENCOUNTER — APPOINTMENT (OUTPATIENT)
Dept: ENDOCRINOLOGY | Facility: MEDICAL CENTER | Age: 39
End: 2019-09-18
Payer: COMMERCIAL

## 2019-09-18 ENCOUNTER — HOSPITAL ENCOUNTER (OUTPATIENT)
Dept: LAB | Facility: MEDICAL CENTER | Age: 39
End: 2019-09-18
Attending: INTERNAL MEDICINE
Payer: COMMERCIAL

## 2019-09-18 DIAGNOSIS — E27.40 ADRENAL INSUFFICIENCY (HCC): ICD-10-CM

## 2019-09-18 DIAGNOSIS — E89.0 HYPOTHYROIDISM, POSTSURGICAL: ICD-10-CM

## 2019-09-18 LAB
ANION GAP SERPL CALC-SCNC: 8 MMOL/L (ref 0–11.9)
BUN SERPL-MCNC: 13 MG/DL (ref 8–22)
CALCIUM SERPL-MCNC: 9.4 MG/DL (ref 8.5–10.5)
CHLORIDE SERPL-SCNC: 105 MMOL/L (ref 96–112)
CO2 SERPL-SCNC: 27 MMOL/L (ref 20–33)
CREAT SERPL-MCNC: 1.16 MG/DL (ref 0.5–1.4)
GLUCOSE SERPL-MCNC: 107 MG/DL (ref 65–99)
POTASSIUM SERPL-SCNC: 3.5 MMOL/L (ref 3.6–5.5)
SODIUM SERPL-SCNC: 140 MMOL/L (ref 135–145)

## 2019-09-18 PROCEDURE — 84443 ASSAY THYROID STIM HORMONE: CPT

## 2019-09-18 PROCEDURE — 84439 ASSAY OF FREE THYROXINE: CPT

## 2019-09-18 PROCEDURE — 80048 BASIC METABOLIC PNL TOTAL CA: CPT

## 2019-09-18 PROCEDURE — 36415 COLL VENOUS BLD VENIPUNCTURE: CPT

## 2019-09-19 ENCOUNTER — APPOINTMENT (OUTPATIENT)
Dept: PHYSICAL MEDICINE AND REHAB | Facility: MEDICAL CENTER | Age: 39
End: 2019-09-19
Payer: COMMERCIAL

## 2019-09-19 LAB
T4 FREE SERPL-MCNC: 0.98 NG/DL (ref 0.53–1.43)
TSH SERPL DL<=0.005 MIU/L-ACNC: 4.94 UIU/ML (ref 0.38–5.33)

## 2019-09-25 ENCOUNTER — OFFICE VISIT (OUTPATIENT)
Dept: ENDOCRINOLOGY | Facility: MEDICAL CENTER | Age: 39
End: 2019-09-25
Payer: COMMERCIAL

## 2019-09-25 VITALS
HEART RATE: 84 BPM | BODY MASS INDEX: 26.6 KG/M2 | WEIGHT: 155.8 LBS | HEIGHT: 64 IN | SYSTOLIC BLOOD PRESSURE: 116 MMHG | DIASTOLIC BLOOD PRESSURE: 72 MMHG | OXYGEN SATURATION: 100 %

## 2019-09-25 DIAGNOSIS — E27.40 ADRENAL INSUFFICIENCY (HCC): ICD-10-CM

## 2019-09-25 DIAGNOSIS — E89.0 HYPOTHYROIDISM, POSTSURGICAL: ICD-10-CM

## 2019-09-25 PROCEDURE — 99213 OFFICE O/P EST LOW 20 MIN: CPT | Performed by: INTERNAL MEDICINE

## 2019-09-26 NOTE — PROGRESS NOTES
Chief Complaint   Patient presents with   • Hypothyroidism     Post thyroidectomy   • Adrenal Insufficiency        HPI:        1. Hypothyroidism post thyroidectomy for thyrotoxicosis.    The patient has done very well following her thyroidectomy last October.  She is clinically euthyroid taking levothyroxine 125 mcg per day.  Her current TSH is 4.9 and free thyroxin 0.9.  She insists she does take it correctly and regularly so I think she probably does need a dose adjustment.  We will increase her levothyroxine to 137 mcg daily and check that again in about two months.      She carries a diagnosis of adrenal insufficiency.  For details consult my progress note of October 2014.  This diagnosis was made when she was desperately ill in the hospital with colitis and pancreatitis.  It was based on two cortisol readings that were both low and no subsequent follow up or stim test.  She was too ill to not give her cortisone and she has been on it ever since.  Currently she is doing well taking 10 mg AM and 5 mg PM.  She has not had an adrenal crisis since the original diagnosis.     Since things are very stable now, I would like to reassess her pituitary adrenal status and see if perhaps we can wean her off cortisol now that she is quite healthy.  I will have her do a morning ACTH and cortisol level before she takes the morning dose.  Depending on those levels, I will then have her skip the 5 mg evening dose and do another set of lab in the morning.  If it looks feasible, I will begin to slowly wean her off hydrocortisone and once off, then we can do a formal stimulation test.  She is in agreement.  I will be relaying test results as I see them and plan to review her status again in the next 2-3 months.     ROS:  All other systems reported as negative or unchanged since last exam      Allergies:   Allergies   Allergen Reactions   • Ativan      Hallucinations, restless   • Compazine Palpitations     Hypertension   •  "Prochlorperazine Palpitations     \"Compazine\"; severe HTN   • Tape Rash     Paper tape and tegaderm is okay.       Current medicines including changes today:  Current Outpatient Medications   Medication Sig Dispense Refill   • minocycline (DYNACIN) 100 MG tablet TAKE ONE TABLET BY MOUTH TWICE A DAY 60 Tab 2   • hydrocortisone (CORTEF) 10 MG Tab TAKE ONE TABLET BY MOUTH TWICE A DAY 60 Tab 5   • levothyroxine (SYNTHROID) 125 MCG Tab Take 1 Tab by mouth Every morning on an empty stomach. 60 Tab 3   • SUMAtriptan (IMITREX) 50 MG Tab TAKE ONE TABLET BY MOUTH ONE TIME AS NEEDED FOR MIGRAINE; MAY REPEAT ONE TABLET IN 2 HOURS IF HEADACHE PERSISTS. MAX OF 2 TABLETS A DAY 9 Tab 11   • Calcium Carb-Cholecalciferol (OYSTER SHELL CALCIUM/VITAMIN D) 250-125 MG-UNIT Tab tablet Take 2 Tabs by mouth 3 times a day. 60 Tab 0   • Ibuprofen (ADVIL) 200 MG CAPS Take 4 Caps by mouth as needed. Indications: Mild to Moderate Pain     • omeprazole (PRILOSEC) 40 MG delayed-release capsule Take 40 mg by mouth every day.     • predniSONE (DELTASONE) 20 MG Tab Take 1 tablet daily for 10 days (Patient not taking: Reported on 9/25/2019) 10 Tab 0     No current facility-administered medications for this visit.         Past Medical History:   Diagnosis Date   • Adrenal insufficiency (HCC) 2014    21-hydroxylase antibody = neg   • Anxiety     in remission   • Blood transfusion, without reported diagnosis 8/2013   • Chronic erosive gastritis 8/14    Dr Mercedes   • Depression     in remission   • GERD (gastroesophageal reflux disease)     omeprazole   • Headache, classical migraine 11/2014    since 8yoa, triggers=unk, imitrex works well.  freq=3-4x/month.     • Heart burn    • Hypercalcemia 2014   • Hypothyroidism, postsurgical    • IgA gammopathy 2014   • Indigestion    • Kidney disease     hx of pyelo, outpat tx   • Mixed anxiety and depressive disorder 2014   • Multiple thyroid nodules 7/31/2017   • Opioid dependence (HCC) 2014    hx of heavy use " "of dilaudid   • Ovarian cyst    • Pain 05-08-15    abd., rectum, 3/10   • Pancreatitis 2014   • S/P total colectomy 2014   • Sciatica 8/26/2019   • Thyrotoxicosis 2015    hyper, on methimazole, sees endo   • Type II or unspecified type diabetes mellitus without mention of complication, not stated as uncontrolled 2/2014    NIDDM, sees endo.  no GDM.  diet controlled    • Ulcerative colitis, chronic (HCC)        PHYSICAL EXAM:    /72 (BP Location: Left arm, Patient Position: Sitting, BP Cuff Size: Adult)   Pulse 84   Ht 1.626 m (5' 4.02\")   Wt 70.7 kg (155 lb 12.8 oz)   SpO2 100%   BMI 26.73 kg/m²     Gen.   appears healthy     Skin   appropriate for sex and age    HEENT  unremarkable    Neck   thyroidectomy wound is healed nicely    Heart  regular    Extremities  no edema    Neuro  gait and station normal    Psych  appropriate    ASSESSMENT AND RECOMMENDATIONS    1. Adrenal insufficiency (HCC)               See HPI               Reassess status to see if we might wean her off of hydrocortisone  - ACTH; Future  - CORTISOL; Future    2. Hypothyroidism, postsurgical               TSH 4.9                Increase levothyroxine to 137 mcg/day and recheck in 2 months      DISPOSITION:   Follow upcoming lab by telephone      Sebastian Soler M.D.    Copies to: Althe[facility] Camarena, LB.P.R.N. [phone]  "

## 2019-10-23 ENCOUNTER — HOSPITAL ENCOUNTER (OUTPATIENT)
Dept: LAB | Facility: MEDICAL CENTER | Age: 39
End: 2019-10-23
Attending: INTERNAL MEDICINE
Payer: COMMERCIAL

## 2019-10-23 DIAGNOSIS — E27.40 ADRENAL INSUFFICIENCY (HCC): ICD-10-CM

## 2019-10-23 LAB — CORTIS SERPL-MCNC: 9.9 UG/DL (ref 0–23)

## 2019-10-23 PROCEDURE — 82024 ASSAY OF ACTH: CPT

## 2019-10-23 PROCEDURE — 36415 COLL VENOUS BLD VENIPUNCTURE: CPT

## 2019-10-23 PROCEDURE — 82533 TOTAL CORTISOL: CPT

## 2019-10-25 LAB — ACTH PLAS-MCNC: 22.4 PG/ML (ref 7.2–63.3)

## 2019-11-06 ENCOUNTER — OFFICE VISIT (OUTPATIENT)
Dept: URGENT CARE | Facility: PHYSICIAN GROUP | Age: 39
End: 2019-11-06
Payer: COMMERCIAL

## 2019-11-06 ENCOUNTER — HOSPITAL ENCOUNTER (OUTPATIENT)
Dept: RADIOLOGY | Facility: MEDICAL CENTER | Age: 39
End: 2019-11-06
Attending: EMERGENCY MEDICINE
Payer: COMMERCIAL

## 2019-11-06 VITALS
RESPIRATION RATE: 18 BRPM | SYSTOLIC BLOOD PRESSURE: 116 MMHG | DIASTOLIC BLOOD PRESSURE: 74 MMHG | HEIGHT: 63 IN | BODY MASS INDEX: 28.35 KG/M2 | OXYGEN SATURATION: 98 % | TEMPERATURE: 98.8 F | HEART RATE: 97 BPM | WEIGHT: 160 LBS

## 2019-11-06 DIAGNOSIS — M79.671 ACUTE PAIN OF RIGHT FOOT: ICD-10-CM

## 2019-11-06 DIAGNOSIS — M67.88 RIGHT PERONEAL TENDINOSIS: ICD-10-CM

## 2019-11-06 PROCEDURE — 73630 X-RAY EXAM OF FOOT: CPT | Mod: RT

## 2019-11-06 PROCEDURE — 99203 OFFICE O/P NEW LOW 30 MIN: CPT | Performed by: EMERGENCY MEDICINE

## 2019-11-06 ASSESSMENT — ENCOUNTER SYMPTOMS
NUMBNESS: 0
FEVER: 0
FOCAL WEAKNESS: 0
SENSORY CHANGE: 0
TINGLING: 0
JOINT SWELLING: 0

## 2019-11-06 NOTE — PROGRESS NOTES
Subjective:      Gala Camarena is a 39 y.o. female who presents with Foot Swelling (pain, bruising x2d)            Foot Problem   This is a new problem. The current episode started yesterday. The problem occurs daily. The problem has been unchanged. Pertinent negatives include no fever, joint swelling, numbness or rash. The symptoms are aggravated by walking. She has tried rest for the symptoms. The treatment provided mild relief.   Patient notes right foot drop issue associated with lower back pain problem; has been improving.  Notes abrupt onset of right lateral foot pain, swelling, bruising yesterday.  No known trauma.    Review of Systems   Constitutional: Negative for fever.   Musculoskeletal: Negative for joint swelling.   Skin: Negative for rash.   Neurological: Negative for tingling, sensory change, focal weakness and numbness.     PMH:  has a past medical history of Adrenal insufficiency ([facility]) (2014), Anxiety, Blood transfusion, without reported diagnosis (8/2013), Chronic erosive gastritis (8/14), Depression, GERD (gastroesophageal reflux disease), Headache, classical migraine (11/2014), Heart burn, Hypercalcemia (2014), Hypothyroidism, postsurgical, IgA gammopathy (2014), Indigestion, Kidney disease, Mixed anxiety and depressive disorder (2014), Multiple thyroid nodules (7/31/2017), Opioid dependence ([facility]) (2014), Ovarian cyst, Pain (05-08-15), Pancreatitis (2014), S/P total colectomy (2014), Sciatica (8/26/2019), Thyrotoxicosis (2015), Type II or unspecified type diabetes mellitus without mention of complication, not stated as uncontrolled (2/2014), and Ulcerative colitis, chronic ([facility]). She also has no past medical history of Clotting disorder ([facility]), COPD (chronic obstructive pulmonary disease) ([facility]), Hyperlipidemia, Meningitis, Osteoporosis, Pituitary disease ([facility]), or Substance abuse ([facility]).  MEDS:   Current Outpatient Medications:   •  minocycline (DYNACIN) 100 MG tablet, TAKE ONE TABLET BY MOUTH  "TWICE A DAY, Disp: 60 Tab, Rfl: 2  •  hydrocortisone (CORTEF) 10 MG Tab, TAKE ONE TABLET BY MOUTH TWICE A DAY, Disp: 60 Tab, Rfl: 5  •  levothyroxine (SYNTHROID) 125 MCG Tab, Take 1 Tab by mouth Every morning on an empty stomach., Disp: 60 Tab, Rfl: 3  •  SUMAtriptan (IMITREX) 50 MG Tab, TAKE ONE TABLET BY MOUTH ONE TIME AS NEEDED FOR MIGRAINE; MAY REPEAT ONE TABLET IN 2 HOURS IF HEADACHE PERSISTS. MAX OF 2 TABLETS A DAY, Disp: 9 Tab, Rfl: 11  •  Calcium Carb-Cholecalciferol (OYSTER SHELL CALCIUM/VITAMIN D) 250-125 MG-UNIT Tab tablet, Take 2 Tabs by mouth 3 times a day., Disp: 60 Tab, Rfl: 0  •  Ibuprofen (ADVIL) 200 MG CAPS, Take 4 Caps by mouth as needed. Indications: Mild to Moderate Pain, Disp: , Rfl:   •  omeprazole (PRILOSEC) 40 MG delayed-release capsule, Take 40 mg by mouth every day., Disp: , Rfl:   •  predniSONE (DELTASONE) 20 MG Tab, Take 1 tablet daily for 10 days (Patient not taking: Reported on 9/25/2019), Disp: 10 Tab, Rfl: 0  ALLERGIES:   Allergies   Allergen Reactions   • Ativan      Hallucinations, restless   • Compazine Palpitations     Hypertension   • Prochlorperazine Palpitations     \"Compazine\"; severe HTN   • Tape Rash     Paper tape and tegaderm is okay.     SURGHX:   Past Surgical History:   Procedure Laterality Date   • THYROIDECTOMY TOTAL  10/31/2018    Procedure: THYROIDECTOMY TOTAL- OR NEAR TOTAL;  Surgeon: Joshua Douglass M.D.;  Location: SURGERY SAME DAY [facility];  Service: General   • THYROIDECTOMY TOTAL  2018    Thyrotoxicosis and benign nodules   • TRANSANAL PARTIAL PROCTECTOMY  5/20/2015    Procedure: TRANSANAL PARTIAL PROCTECTOMY Ileoanal J Pouch;  Surgeon: Smith Granado M.D.;  Location: SURGERY [facility];  Service:    • LOW ANTERIOR RESECTION  5/20/2015    Procedure: LOW ANTERIOR RESECTION;  Surgeon: Smith Granado M.D.;  Location: SURGERY [facility];  Service:    • EGD WITH ASP/BX  8/14     Daren   • EXPLORATORY LAPAROTOMY  5/19/2014    Performed by Smith RIOS" "LIOR Granado at SURGERY [facility]   • ILEO LOOP DIVERSION  5/19/2014    Performed by Smith Granado M.D. at SURGERY [facility]   • GASTROSCOPY-ENDO  5/14/2014    Performed by Celio Hill M.D. at ENDOSCOPY [facility]   • SIGMOIDOSCOPY FLEX  4/6/2014    Performed by Mauro Mercedes Jr., M.D. at ENDOSCOPY [facility]   • COLECTOMY  3/19/2014    Performed by Smith Granado M.D. at SURGERY [facility]   • ILEOSTOMY  3/19/2014    Performed by Smith Granado M.D. at SURGERY [facility]   • COLONOSCOPY - ENDO  3/4/2014    Performed by Hernan Luna M.D. at SURGERY [facility]   • CHOLECYSTECTOMY  2014   • OVARIAN CYSTECTOMY      2007     SOCHX:  reports that she has been smoking cigarettes. She has been smoking about 0.00 packs per day for the past 13.00 years. She has never used smokeless tobacco. She reports that she does not drink alcohol or use drugs.  FH: family history includes Cancer in her maternal grandmother; Diabetes in her mother; Heart Disease in her maternal grandfather; Hypertension in her maternal aunt and mother.     Objective:     /74 (BP Location: Left arm, Patient Position: Sitting, BP Cuff Size: Adult)   Pulse 97   Temp 37.1 °C (98.8 °F) (Temporal)   Resp 18   Ht 1.6 m (5' 3\")   Wt 72.6 kg (160 lb)   SpO2 98%   BMI 28.34 kg/m²      Physical Exam  Constitutional:       General: She is not in acute distress.     Appearance: Normal appearance.   Cardiovascular:      Pulses:           Dorsalis pedis pulses are 2+ on the right side.        Posterior tibial pulses are 2+ on the right side.   Musculoskeletal:      Right ankle: Normal.      Right foot: Normal range of motion. Bony tenderness, swelling and crepitus present. No deformity.        Feet:    Skin:     General: Skin is warm and dry.   Neurological:      Mental Status: She is alert.      Comments: Distal sensation to light touch and pressure intact.   Psychiatric:         Mood and Affect: Mood normal.        "  Behavior: Behavior is cooperative.            Not tender at site of x-ray abnormality; doubt fracture.     Assessment/Plan:       1. Right peroneal tendinosis  Walking boot splint  Ice, elevation prn  REF [employer]OR[employer] [facility]    2. Acute pain of right foot  - DX-FOOT-COMPLETE 3+ RIGHT; per radiologist  Subtle lucent line through the proximal shaft of the fifth metatarsal could represent vascular channel, nondisplaced fracture or perhaps stress fracture.

## 2019-11-20 ENCOUNTER — OFFICE VISIT (OUTPATIENT)
Dept: ENDOCRINOLOGY | Facility: MEDICAL CENTER | Age: 39
End: 2019-11-20
Payer: COMMERCIAL

## 2019-11-20 VITALS
WEIGHT: 157.4 LBS | HEART RATE: 86 BPM | BODY MASS INDEX: 27.89 KG/M2 | DIASTOLIC BLOOD PRESSURE: 78 MMHG | HEIGHT: 63 IN | SYSTOLIC BLOOD PRESSURE: 118 MMHG | OXYGEN SATURATION: 99 %

## 2019-11-20 DIAGNOSIS — E89.0 HYPOTHYROIDISM, POSTSURGICAL: ICD-10-CM

## 2019-11-20 DIAGNOSIS — E27.40 ADRENAL INSUFFICIENCY (HCC): ICD-10-CM

## 2019-11-20 PROCEDURE — 99213 OFFICE O/P EST LOW 20 MIN: CPT | Performed by: INTERNAL MEDICINE

## 2019-11-20 NOTE — PROGRESS NOTES
Chief Complaint   Patient presents with   • Hypothyroidism     Post thyroidectomy October 2018   • Adrenal Insufficiency        HPI:    Hypothyroidism    I am still adjusting her thyroid dose and am interested in seeing if we can wean her off of her hydrocortisone.  Things are moving slowly somewhat.  She had her thyroidectomy about a year ago, October 2018.  This September her TSH 4.9 so I increased her levothyroxine up to 137 mcg per day.  However she had her previous dose that she wanted to finish out so she has not even picked up the new dose and started it.  She is feeling pretty much okay so I guess she doesn’t feel an urgent need to adjust her thyroid.  However I told her to go ahead and do it now and I will check again in about a month and see how that is responding.     In the meantime, she is still taking hydrocortisone 10 mg AM 5 mg PM.  Last month we have a morning cortisol of 9.9 before taking the hydrocortisone and an ACTH of 22.4.  That level is very encouraging that she can possibly wean off of hydrocortisone which is my real interest.  Consult my note of October 2014 for the details about how she got started on hydrocortisone in the first place.  She does not have typical Goode’s disease.     I will see her again next month.  I don’t want to wean off of hydrocortisone while I am adjusting her thyroid dose up.  I will wait until that gets settled.  Next month when we do thyroid levels, I would like her to skip her evening dose of 5 mg hydrocortisone and have the blood drawn before the morning dose of 10 mg.  Then take the 10 mg to the lab with her and take the 10 mg after the blood is drawn.  I will see her around that time to review.     ROS:  The stress fracture in her foot apparently is healing and the tendinitis is resolved.  Not on prednisone      Allergies:   Allergies   Allergen Reactions   • Ativan      Hallucinations, restless   • Compazine Palpitations     Hypertension   •  "Prochlorperazine Palpitations     \"Compazine\"; severe HTN   • Tape Rash     Paper tape and tegaderm is okay.       Current medicines including changes today:  Current Outpatient Medications   Medication Sig Dispense Refill   • minocycline (DYNACIN) 100 MG tablet TAKE ONE TABLET BY MOUTH TWICE A DAY 60 Tab 2   • hydrocortisone (CORTEF) 10 MG Tab TAKE ONE TABLET BY MOUTH TWICE A DAY 60 Tab 5   • levothyroxine (SYNTHROID) 125 MCG Tab Take 1 Tab by mouth Every morning on an empty stomach. 60 Tab 3   • SUMAtriptan (IMITREX) 50 MG Tab TAKE ONE TABLET BY MOUTH ONE TIME AS NEEDED FOR MIGRAINE; MAY REPEAT ONE TABLET IN 2 HOURS IF HEADACHE PERSISTS. MAX OF 2 TABLETS A DAY 9 Tab 11   • Calcium Carb-Cholecalciferol (OYSTER SHELL CALCIUM/VITAMIN D) 250-125 MG-UNIT Tab tablet Take 2 Tabs by mouth 3 times a day. 60 Tab 0   • Ibuprofen (ADVIL) 200 MG CAPS Take 4 Caps by mouth as needed. Indications: Mild to Moderate Pain     • omeprazole (PRILOSEC) 40 MG delayed-release capsule Take 40 mg by mouth every day.     • predniSONE (DELTASONE) 20 MG Tab Take 1 tablet daily for 10 days (Patient not taking: Reported on 9/25/2019) 10 Tab 0     No current facility-administered medications for this visit.         Past Medical History:   Diagnosis Date   • Adrenal insufficiency (HCC) 2014    21-hydroxylase antibody = neg   • Anxiety     in remission   • Blood transfusion, without reported diagnosis 8/2013   • Chronic erosive gastritis 8/14    Dr Mercedes   • Depression     in remission   • GERD (gastroesophageal reflux disease)     omeprazole   • Headache, classical migraine 11/2014    since 8yoa, triggers=unk, imitrex works well.  freq=3-4x/month.     • Heart burn    • Hypercalcemia 2014   • Hypothyroidism, postsurgical    • IgA gammopathy 2014   • Indigestion    • Kidney disease     hx of pyelo, outpat tx   • Mixed anxiety and depressive disorder 2014   • Multiple thyroid nodules 7/31/2017   • Opioid dependence (HCC) 2014    hx of heavy use " "of dilaudid   • Ovarian cyst    • Pain 05-08-15    abd., rectum, 3/10   • Pancreatitis 2014   • S/P total colectomy 2014   • Sciatica 8/26/2019   • Thyrotoxicosis 2015    hyper, on methimazole, sees endo   • Type II or unspecified type diabetes mellitus without mention of complication, not stated as uncontrolled 2/2014    NIDDM, sees endo.  no GDM.  diet controlled    • Ulcerative colitis, chronic (HCC)        PHYSICAL EXAM:    /78 (BP Location: Left arm, Patient Position: Sitting, BP Cuff Size: Adult)   Pulse 86   Ht 1.6 m (5' 2.99\")   Wt 71.4 kg (157 lb 6.4 oz)   SpO2 99%   BMI 27.89 kg/m²     Gen.   appears healthy     Skin   appropriate for sex and age    HEENT  unremarkable    Neck   no palpable thyroid post thyroidectomy    Heart  regular    Extremities  no edema    Neuro  gait and station normal              Walking without a limp    Psych  appropriate    ASSESSMENT AND RECOMMENDATIONS    1. Hypothyroidism, postsurgical             She should begin her new dose of thyroid now.             Levothyroxine 137 mcg daily and follow-up in 1 month  - FREE THYROXINE; Future  - TSH; Future    2. Adrenal insufficiency (HCC)             Next month we will do a morning cortisol and ACTH after skipping the evening dose the day before and before the morning dose.  - ACTH; Future  - CORTISOL; Future      DISPOSITION: Follow-up in 1 month      Sebastian Soler M.D.    Copies to: Althea Camarena A.P.R.NTru [phone]  "

## 2019-11-24 RX ORDER — MINOCYCLINE HYDROCHLORIDE 100 MG/1
TABLET ORAL
Qty: 60 TAB | Refills: 2 | Status: SHIPPED
Start: 2019-11-24 | End: 2020-08-28

## 2020-01-02 ENCOUNTER — HOSPITAL ENCOUNTER (OUTPATIENT)
Dept: LAB | Facility: MEDICAL CENTER | Age: 40
End: 2020-01-02
Attending: INTERNAL MEDICINE
Payer: COMMERCIAL

## 2020-01-02 DIAGNOSIS — E27.40 ADRENAL INSUFFICIENCY (HCC): ICD-10-CM

## 2020-01-02 DIAGNOSIS — E89.0 HYPOTHYROIDISM, POSTSURGICAL: ICD-10-CM

## 2020-01-02 LAB
CORTIS SERPL-MCNC: 6.9 UG/DL (ref 0–23)
T4 FREE SERPL-MCNC: 1.27 NG/DL (ref 0.53–1.43)
TSH SERPL DL<=0.005 MIU/L-ACNC: 0.28 UIU/ML (ref 0.38–5.33)

## 2020-01-02 PROCEDURE — 84443 ASSAY THYROID STIM HORMONE: CPT

## 2020-01-02 PROCEDURE — 36415 COLL VENOUS BLD VENIPUNCTURE: CPT

## 2020-01-02 PROCEDURE — 82533 TOTAL CORTISOL: CPT

## 2020-01-02 PROCEDURE — 82024 ASSAY OF ACTH: CPT

## 2020-01-02 PROCEDURE — 84439 ASSAY OF FREE THYROXINE: CPT

## 2020-01-04 LAB — ACTH PLAS-MCNC: 14.2 PG/ML (ref 7.2–63.3)

## 2020-01-08 ENCOUNTER — OFFICE VISIT (OUTPATIENT)
Dept: ENDOCRINOLOGY | Facility: MEDICAL CENTER | Age: 40
End: 2020-01-08
Payer: COMMERCIAL

## 2020-01-08 VITALS
HEIGHT: 63 IN | HEART RATE: 82 BPM | WEIGHT: 156 LBS | OXYGEN SATURATION: 100 % | DIASTOLIC BLOOD PRESSURE: 82 MMHG | BODY MASS INDEX: 27.64 KG/M2 | SYSTOLIC BLOOD PRESSURE: 120 MMHG

## 2020-01-08 DIAGNOSIS — E27.40 ADRENAL INSUFFICIENCY (HCC): ICD-10-CM

## 2020-01-08 DIAGNOSIS — E89.0 HYPOTHYROIDISM, POSTSURGICAL: ICD-10-CM

## 2020-01-08 PROCEDURE — 99213 OFFICE O/P EST LOW 20 MIN: CPT | Performed by: INTERNAL MEDICINE

## 2020-01-08 RX ORDER — LEVOTHYROXINE SODIUM 137 UG/1
137 TABLET ORAL
Qty: 30 TAB | Refills: 5 | Status: SHIPPED | OUTPATIENT
Start: 2020-01-08 | End: 2020-08-26

## 2020-01-09 NOTE — PROGRESS NOTES
"Chief Complaint   Patient presents with   • Hypothyroidism   • Adrenal Insufficiency        HPI:    1. Hypothyroidism and adrenal insufficiency.    Things have gotten somewhat complicated.  Because of the timing of different medications such as thyroid versus calcium versus antacids versus her hydrocortisone, she has come around to taking her hydrocortisone 5 mg in the morning and 10 mg in the evening.  In terms of her hypothyroidism, I wanted her to increase her dose to 137 mcg per day.  The pharmacy could not find the prescription so what she did was take 125 mg every day and one day a week take an extra tablet which averages out to 142 mcg per day.  As a result her TSH has come down further than I wanted.  It came down from 4.9 to 0.2 and free T4 went up from 0.9 up to 1.2 which is okay and she is clinically euthyroid.  We also had her do a morning blood test not taking the evening hydrocortisone and before the morning hydrocortisone, the morning cortisol level was 6.9 but encouragingly ACTH =14.2.  I am going to try to wean her off of hydrocortisone.    The new regimen will be I will put in a new prescription for levothyroxine 137 mcg every day and I will recheck that in two months.     I am going to switch her hydrocortisone to 10 mg AM only and in three weeks do a morning ACTH and cortisol before she takes her hydrocortisone.  I will follow that up by MyChart or telephone and see if we can continue to wean off hydrocortisone.  I will see her in two months and we will follow up on the thyroid levels.      ROS:  All other systems reported as negative or unchanged since last exam      Allergies:   Allergies   Allergen Reactions   • Ativan      Hallucinations, restless   • Compazine Palpitations     Hypertension   • Prochlorperazine Palpitations     \"Compazine\"; severe HTN   • Tape Rash     Paper tape and tegaderm is okay.       Current medicines including changes today:  Current Outpatient Medications "   Medication Sig Dispense Refill   • levothyroxine (SYNTHROID) 137 MCG Tab Take 1 Tab by mouth Every morning on an empty stomach. 30 Tab 5   • minocycline (DYNACIN) 100 MG tablet TAKE ONE TABLET BY MOUTH TWICE A DAY 60 Tab 2   • hydrocortisone (CORTEF) 10 MG Tab TAKE ONE TABLET BY MOUTH TWICE A DAY 60 Tab 5   • SUMAtriptan (IMITREX) 50 MG Tab TAKE ONE TABLET BY MOUTH ONE TIME AS NEEDED FOR MIGRAINE; MAY REPEAT ONE TABLET IN 2 HOURS IF HEADACHE PERSISTS. MAX OF 2 TABLETS A DAY 9 Tab 11   • Calcium Carb-Cholecalciferol (OYSTER SHELL CALCIUM/VITAMIN D) 250-125 MG-UNIT Tab tablet Take 2 Tabs by mouth 3 times a day. 60 Tab 0   • Ibuprofen (ADVIL) 200 MG CAPS Take 4 Caps by mouth as needed. Indications: Mild to Moderate Pain     • omeprazole (PRILOSEC) 40 MG delayed-release capsule Take 40 mg by mouth every day.     • predniSONE (DELTASONE) 20 MG Tab Take 1 tablet daily for 10 days (Patient not taking: Reported on 1/8/2020) 10 Tab 0     No current facility-administered medications for this visit.         Past Medical History:   Diagnosis Date   • Adrenal insufficiency (HCC) 2014    21-hydroxylase antibody = neg   • Anxiety     in remission   • Blood transfusion, without reported diagnosis 8/2013   • Chronic erosive gastritis 8/14    Dr Mercedes   • Depression     in remission   • GERD (gastroesophageal reflux disease)     omeprazole   • Headache, classical migraine 11/2014    since 8yoa, triggers=unk, imitrex works well.  freq=3-4x/month.     • Heart burn    • Hypercalcemia 2014   • Hypothyroidism, postsurgical    • IgA gammopathy 2014   • Indigestion    • Kidney disease     hx of pyelo, outpat tx   • Mixed anxiety and depressive disorder 2014   • Multiple thyroid nodules 7/31/2017   • Opioid dependence (HCC) 2014    hx of heavy use of dilaudid   • Ovarian cyst    • Pain 05-08-15    abd., rectum, 3/10   • Pancreatitis 2014   • S/P total colectomy 2014   • Sciatica 8/26/2019   • Thyrotoxicosis 2015    hyper, on  "methimazole, sees endo   • Type II or unspecified type diabetes mellitus without mention of complication, not stated as uncontrolled 2/2014    NIDDM, sees endo.  no GDM.  diet controlled    • Ulcerative colitis, chronic (HCC)        PHYSICAL EXAM:    /82 (BP Location: Left arm, Patient Position: Sitting, BP Cuff Size: Adult)   Pulse 82   Ht 1.6 m (5' 2.99\")   Wt 70.8 kg (156 lb)   SpO2 100%   BMI 27.64 kg/m²     Gen.   appears healthy     Skin   appropriate for sex and age    HEENT  unremarkable    Neck   thyroid gland difficult to palpate.  May be atrophic    Heart  regular    Extremities  no edema    Neuro  gait and station normal    Psych  appropriate    ASSESSMENT AND RECOMMENDATIONS    1. Adrenal insufficiency (HCC)             ?  Remission              We will see if we can wean off of hydrocortisone  - ACTH; Future  - CORTISOL; Future    2. Hypothyroidism, postsurgical           Clinically euthyroid taking levothyroxine 125 mcg daily with 1 extra tablet 1 day/week which averages 142 mcg.  Current TSH slightly suppressed at 0.2.  We will substitute levothyroxine 137 mcg daily and recheck in 2 months    - levothyroxine (SYNTHROID) 137 MCG Tab; Take 1 Tab by mouth Every morning on an empty stomach.  Dispense: 30 Tab; Refill: 5      DISPOSITION: Follow-up current cortisol ACTH tests to be done next week by telephone and continue the weaning process.             Return to clinic in 2 months      Sebastian Soler M.D.    Copies to: SHANNA Wagoner [phone]  "

## 2020-02-20 ENCOUNTER — HOSPITAL ENCOUNTER (OUTPATIENT)
Dept: LAB | Facility: MEDICAL CENTER | Age: 40
End: 2020-02-20
Attending: INTERNAL MEDICINE
Payer: COMMERCIAL

## 2020-02-20 DIAGNOSIS — E27.40 ADRENAL INSUFFICIENCY (HCC): ICD-10-CM

## 2020-02-20 PROCEDURE — 82533 TOTAL CORTISOL: CPT

## 2020-02-20 PROCEDURE — 82024 ASSAY OF ACTH: CPT

## 2020-02-20 PROCEDURE — 36415 COLL VENOUS BLD VENIPUNCTURE: CPT

## 2020-02-21 LAB
ACTH PLAS-MCNC: 23.6 PG/ML (ref 7.2–63.3)
CORTIS SERPL-MCNC: 13.2 UG/DL (ref 0–23)

## 2020-02-23 DIAGNOSIS — E27.40 ADRENAL INSUFFICIENCY (HCC): ICD-10-CM

## 2020-04-24 DIAGNOSIS — E89.0 HYPOTHYROIDISM, POSTSURGICAL: ICD-10-CM

## 2020-04-24 RX ORDER — LEVOTHYROXINE SODIUM 0.12 MG/1
TABLET ORAL
Qty: 90 TAB | Refills: 2 | Status: SHIPPED
Start: 2020-04-24 | End: 2021-03-13

## 2020-07-03 DIAGNOSIS — E27.40 ADRENAL INSUFFICIENCY (HCC): ICD-10-CM

## 2020-07-06 RX ORDER — HYDROCORTISONE 10 MG/1
TABLET ORAL
Qty: 60 TAB | Refills: 4 | Status: SHIPPED | OUTPATIENT
Start: 2020-07-06 | End: 2022-03-25

## 2020-07-06 NOTE — TELEPHONE ENCOUNTER
Received request via: Pharmacy    Was the patient seen in the last year in this department? Yes    Does the patient have an active prescription (recently filled or refills available) for medication(s) requested? No     hydrocortisone (CORTEF) 10 MG Tab         Sig: TAKE ONE TABLET BY MOUTH TWICE A DAY

## 2020-08-25 DIAGNOSIS — E89.0 HYPOTHYROIDISM, POSTSURGICAL: ICD-10-CM

## 2020-08-26 RX ORDER — LEVOTHYROXINE SODIUM 137 UG/1
TABLET ORAL
Qty: 30 TAB | Refills: 4 | Status: SHIPPED | OUTPATIENT
Start: 2020-08-26 | End: 2021-03-13

## 2020-08-28 ENCOUNTER — OFFICE VISIT (OUTPATIENT)
Dept: PHYSICAL MEDICINE AND REHAB | Facility: MEDICAL CENTER | Age: 40
End: 2020-08-28
Payer: COMMERCIAL

## 2020-08-28 VITALS
BODY MASS INDEX: 27.77 KG/M2 | TEMPERATURE: 98.6 F | OXYGEN SATURATION: 98 % | WEIGHT: 156.75 LBS | HEIGHT: 63 IN | DIASTOLIC BLOOD PRESSURE: 64 MMHG | HEART RATE: 83 BPM | SYSTOLIC BLOOD PRESSURE: 118 MMHG

## 2020-08-28 DIAGNOSIS — M54.16 LUMBAR RADICULITIS: ICD-10-CM

## 2020-08-28 DIAGNOSIS — F41.9 ANXIETY: ICD-10-CM

## 2020-08-28 DIAGNOSIS — M51.36 DDD (DEGENERATIVE DISC DISEASE), LUMBAR: ICD-10-CM

## 2020-08-28 PROCEDURE — 99205 OFFICE O/P NEW HI 60 MIN: CPT | Performed by: PHYSICAL MEDICINE & REHABILITATION

## 2020-08-28 RX ORDER — DIAZEPAM 2 MG/1
2 TABLET ORAL
Qty: 2 TAB | Refills: 0 | Status: SHIPPED | OUTPATIENT
Start: 2020-08-28 | End: 2020-08-29

## 2020-08-28 ASSESSMENT — PATIENT HEALTH QUESTIONNAIRE - PHQ9: CLINICAL INTERPRETATION OF PHQ2 SCORE: 0

## 2020-08-28 ASSESSMENT — FIBROSIS 4 INDEX: FIB4 SCORE: 0.73

## 2020-08-28 ASSESSMENT — PAIN SCALES - GENERAL: PAINLEVEL: 6=MODERATE PAIN

## 2020-08-28 NOTE — PROGRESS NOTES
New patient note    Physiatry (physical medicine and  Rehabilitation), interventional spine and sports medicine    Date of Service: 08/28/2020    Chief complaint:   Chief Complaint   Patient presents with   • New Patient     Sciatica       HISTORY    HPI: Gala Camarena 40 y.o. female who presents today for evaluation of low back pain.  She reports that she had surgery 10/31/2018 thyroidectomy and around this time, she started having low back pain.  The pain seems to be either on the left or the right.    Pain is a 7/10 on the NRS.  Sometimes, she feels like she cannot walk due to the pain.  When she walks more, it gets worse when the pain is present on the right.  When it is on the left, she can sleep okay and walking seems to help.    She has pain in the left lower part of her leg and occasional numbness on the bottom of her left foot.  The pain has been longer-standing on the left side.  But, previously, she had right-sided leg pain.    Sitting and standing makes her pain worse.  Coughing and sneezing makes the pain worse.  Going up stairs is more difficult than going down.  Twisting makes the pain worse.    In the morning, she uses ice and takes advil.  After 30 minutes on ice, she seems to move better.     She has seen a chiropractor, no clear lasting benefit.  She had a two week round of prednisone from Urgent care once and this helped, about a year ago.       Medical records review:  Dr. Robert Headley 08/13/2019 She was seen for right leg pain.  She was referred to physical therapy and physiatry at that time.    Previous treatments:    Physical Therapy: No    Medications the patient is tried: NSAIDs    Previous interventions: none     Previous surgeries to relieve the above pain:  none      ROS:   GI: Ulcerative colitis, s/p colon resection 2014  Endo: Complete thyroidectomy  Red Flags ROS:   Fever, Chills, Sweats: Denies  Involuntary Weight Loss: Denies  Bladder Incontinence: Denies  Bowel Incontinence:  Denies  Saddle Anesthesia: Denies    All other systems reviewed and negative.       PMHx:   Past Medical History:   Diagnosis Date   • Adrenal insufficiency (HCC) 2014    21-hydroxylase antibody = neg   • Anxiety     in remission   • Blood transfusion, without reported diagnosis 8/2013   • Chronic erosive gastritis 8/14    Dr Mercedes   • Depression     in remission   • GERD (gastroesophageal reflux disease)     omeprazole   • Headache, classical migraine 11/2014    since 8yoa, triggers=unk, imitrex works well.  freq=3-4x/month.     • Heart burn    • Hypercalcemia 2014   • Hypothyroidism, postsurgical    • IgA gammopathy 2014   • Indigestion    • Kidney disease     hx of pyelo, outpat tx   • Mixed anxiety and depressive disorder 2014   • Multiple thyroid nodules 7/31/2017   • Opioid dependence (HCC) 2014    hx of heavy use of dilaudid   • Ovarian cyst    • Pain 05-08-15    abd., rectum, 3/10   • Pancreatitis 2014   • S/P total colectomy 2014   • Sciatica 8/26/2019   • Thyrotoxicosis 2015    hyper, on methimazole, sees endo   • Type II or unspecified type diabetes mellitus without mention of complication, not stated as uncontrolled 2/2014    NIDDM, sees endo.  no GDM.  diet controlled    • Ulcerative colitis, chronic (HCC)        PSHx:   Past Surgical History:   Procedure Laterality Date   • THYROIDECTOMY TOTAL  10/31/2018    Procedure: THYROIDECTOMY TOTAL- OR NEAR TOTAL;  Surgeon: Joshua Douglass M.D.;  Location: SURGERY SAME DAY [facility];  Service: General   • THYROIDECTOMY TOTAL  2018    Thyrotoxicosis and benign nodules   • TRANSANAL PARTIAL PROCTECTOMY  5/20/2015    Procedure: TRANSANAL PARTIAL PROCTECTOMY Ileoanal J Pouch;  Surgeon: Smith Granado M.D.;  Location: SURGERY [facility];  Service:    • LOW ANTERIOR RESECTION  5/20/2015    Procedure: LOW ANTERIOR RESECTION;  Surgeon: Smith Granado M.D.;  Location: SURGERY [facility];  Service:    • EGD WITH ASP/BX  8/14     Daren   • EXPLORATORY  "LAPAROTOMY  5/19/2014    Performed by Smith Granado M.D. at SURGERY [facility]   • ILEO LOOP DIVERSION  5/19/2014    Performed by Smith Granado M.D. at SURGERY [facility]   • GASTROSCOPY-ENDO  5/14/2014    Performed by Celio Hill M.D. at ENDOSCOPY [facility]   • SIGMOIDOSCOPY FLEX  4/6/2014    Performed by Mauro Mercedes Jr., M.D. at ENDOSCOPY [facility]   • COLECTOMY  3/19/2014    Performed by Smith Granado M.D. at SURGERY [facility]   • ILEOSTOMY  3/19/2014    Performed by Smith Granado M.D. at SURGERY [facility]   • COLONOSCOPY - ENDO  3/4/2014    Performed by Hernan Lnua M.D. at SURGERY [facility]   • CHOLECYSTECTOMY  2014   • OVARIAN CYSTECTOMY      2007       Family history   Family History   Problem Relation Age of Onset   • Hypertension Mother    • Diabetes Mother    • Cancer Maternal Grandmother         Ovarian   • Heart Disease Maternal Grandfather         chf   • Hypertension Maternal Aunt          Medications:   Current Outpatient Medications   Medication   • levothyroxine (SYNTHROID) 137 MCG Tab   • hydrocortisone (CORTEF) 10 MG Tab   • SUMAtriptan (IMITREX) 50 MG Tab   • Calcium Carb-Cholecalciferol (OYSTER SHELL CALCIUM/VITAMIN D) 250-125 MG-UNIT Tab tablet   • Ibuprofen (ADVIL) 200 MG CAPS   • levothyroxine (SYNTHROID) 125 MCG Tab   • omeprazole (PRILOSEC) 40 MG delayed-release capsule     No current facility-administered medications for this visit.        Allergies:   Allergies   Allergen Reactions   • Ativan      Hallucinations, restless   • Compazine Palpitations     Hypertension   • Prochlorperazine Palpitations     \"Compazine\"; severe HTN   • Tape Rash     Paper tape and tegaderm is okay.       Social Hx:   Social History     Socioeconomic History   • Marital status:      Spouse name: Not on file   • Number of children: Not on file   • Years of education: Not on file   • Highest education level: Not on file   Occupational History   • Not on file " "  Social Needs   • Financial resource strain: Not on file   • Food insecurity     Worry: Not on file     Inability: Not on file   • Transportation needs     Medical: Not on file     Non-medical: Not on file   Tobacco Use   • Smoking status: Current Some Day Smoker     Packs/day: 0.00     Years: 13.00     Pack years: 0.00     Types: Cigarettes   • Smokeless tobacco: Never Used   Substance and Sexual Activity   • Alcohol use: No     Comment: *social smoking    • Drug use: No   • Sexual activity: Never     Birth control/protection: I.U.D.     Comment: mirena 8/2013   Lifestyle   • Physical activity     Days per week: Not on file     Minutes per session: Not on file   • Stress: Not on file   Relationships   • Social connections     Talks on phone: Not on file     Gets together: Not on file     Attends Sikhism service: Not on file     Active member of club or organization: Not on file     Attends meetings of clubs or organizations: Not on file     Relationship status: Not on file   • Intimate partner violence     Fear of current or ex partner: Not on file     Emotionally abused: Not on file     Physically abused: Not on file     Forced sexual activity: Not on file   Other Topics Concern   •  Service No   • Blood Transfusions Yes   • Caffeine Concern No   • Occupational Exposure Yes   • Hobby Hazards No   • Sleep Concern No   • Stress Concern No   • Weight Concern Yes   • Special Diet No   • Back Care No   • Exercise No   • Bike Helmet No   • Seat Belt Yes   • Self-Exams Yes   Social History Narrative   • Not on file         EXAMINATION     Physical Exam:   Vitals: /64 (BP Location: Left arm, Patient Position: Sitting, BP Cuff Size: Small adult)   Pulse 83   Temp 37 °C (98.6 °F) (Temporal)   Ht 1.6 m (5' 3\")   Wt 71.1 kg (156 lb 12 oz)   SpO2 98%     Constitutional:   Body Habitus: Body mass index is 27.77 kg/m².  Cooperation: Fully cooperates with exam  Appearance: Well-groomed, well-nourished, not " disheveled, in no acute distress    Eyes: No scleral icterus, no proptosis     ENT -no obvious auditory deficits, wearing a face mask    Skin -no rashes or lesions noted     Respiratory-  breathing comfortable on room air, no audible wheezing    Cardiovascular- capillary refills less than 2 seconds. No lower extremity edema is noted.     Gastrointestinal - no obvious abdominal masses, No tenderness to palpation in the abdomen    Psychiatric- alert and oriented ×3. Normal affect.     Gait - normal gait, no use of ambulatory device, nonantalgic. The patient can toe walk with ease. The patient can heel walk with ease..     Musculoskeletal -   Cervical spine   Inspection: No deformities of the skin over the cervical spine. No rashes or lesions.    full  A/P ROM in all directions, without  pain      Spurling’s sign: negative bilaterally    No signs of muscular atrophy in bilateral upper extremities     Thoracic/Lumbar Spine/Sacral Spine/Hips   Inspection: No evidence of atrophy in bilateral lower extremities throughout     ROM: decreased AROM with flexion, extension, lateral flexion bilaterally, with pain in extension and quadrant loading on the left and right causing pain in the left, sweating noted after ROM    Palpation:   No tenderness to palpation in midline at T1-T12 levels. No tenderness to palpation in the left and right of the midline T1-L5, POSITIVE for tenderness to palpation to the para-midline region in the lower lumbar levels.  palpation over SI joint: positive left, negative right  palpation over buttock: positive left, negative right   palpation in hip or over the greater trochanters: negative bilaterally      Lumbar spine Special tests  Neuro tension  Straight leg test positive right, negative left      HIP   Range of motion in the hips is within normal limits in internal and external rotation bilaterally    SI joint tests  Observation patient sits on one buttocks: Negative  SUSI test negative  bilaterally       Neuro       Key points for the international standards for neurological classification of spinal cord injury (ISNCSCI) to light touch.     Dermatome R L   L2 2 2   L3 2 2   L4 2 2   L5 2 2   S1 2 2   S2 2 2         Motor Exam Lower Extremities    ? Myotome R L   Hip flexion L2 5 5   Knee extension L3 5 5   Ankle dorsiflexion L4 5 5   Toe extension L5 4 5   Ankle plantarflexion S1 5 5         Cabral’s sign negative bilaterally   Babinski sign negative bilaterally   Clonus of the ankle negative bilaterally     Reflexes  ?  R L   Biceps  2+  2+   Brachioradialis  2+ 2+   Patella  2+ 2+   Achilles   1+ 2+       MEDICAL DECISION MAKING    Medical records review: see under HPI section.     DATA    Labs:   Lab Results   Component Value Date/Time    SODIUM 140 09/18/2019 01:14 PM    POTASSIUM 3.5 (L) 09/18/2019 01:14 PM    CHLORIDE 105 09/18/2019 01:14 PM    CO2 27 09/18/2019 01:14 PM    ANION 8.0 09/18/2019 01:14 PM    GLUCOSE 107 (H) 09/18/2019 01:14 PM    BUN 13 09/18/2019 01:14 PM    CREATININE 1.16 09/18/2019 01:14 PM    CALCIUM 9.4 09/18/2019 01:14 PM    ASTSGOT 17 09/06/2018 01:52 PM    ALTSGPT 18 09/06/2018 01:52 PM    TBILIRUBIN 0.4 09/06/2018 01:52 PM    ALBUMIN 3.9 02/01/2019 02:26 PM    ALBUMIN 3.28 (L) 08/18/2014 12:04 PM    TOTPROTEIN 6.5 09/06/2018 01:52 PM    TOTPROTEIN 7.20 08/18/2014 12:04 PM    GLOBULIN 2.7 09/06/2018 01:52 PM    AGRATIO 1.4 09/06/2018 01:52 PM       Lab Results   Component Value Date/Time    PROTHROMBTM 14.3 05/09/2014 08:30 AM    INR 1.12 05/09/2014 08:30 AM        Lab Results   Component Value Date/Time    WBC 8.7 10/25/2018 11:18 AM    RBC 5.14 10/25/2018 11:18 AM    HEMOGLOBIN 14.8 10/25/2018 11:18 AM    HEMATOCRIT 45.9 10/25/2018 11:18 AM    MCV 89.3 10/25/2018 11:18 AM    MCH 28.8 10/25/2018 11:18 AM    MCHC 32.2 (L) 10/25/2018 11:18 AM    MPV 9.5 10/25/2018 11:18 AM    NEUTSPOLYS 72.80 (H) 10/25/2018 11:18 AM    LYMPHOCYTES 20.00 (L) 10/25/2018 11:18 AM     MONOCYTES 5.60 10/25/2018 11:18 AM    EOSINOPHILS 1.00 10/25/2018 11:18 AM    EOSINOPHILS 2 06/04/2014 11:45 AM    BASOPHILS 0.30 10/25/2018 11:18 AM    HYPOCHROMIA 1+ 08/11/2014 02:56 PM    ANISOCYTOSIS 1+ 05/12/2014 12:40 PM        Lab Results   Component Value Date/Time    HBA1C 6.7 (H) 09/06/2018 01:52 PM        Imaging: I personally reviewed following images, these are my reads  CT abdomen/pelvis with contrast 09/03/2014 with focus on lumbar spine  There is degenerative disc disease greatest at L5-S1    IMAGING radiology reads. I reviewed the following radiology reads   CT abdomen/pelvis with contrast 09/03/2014     Impression       1.  Left lower lobe atelectasis/possible pneumonia and small left pleural effusion.   2.  No pulmonary nodules within the right lung base identified. The right lung base is incompletely imaged as compared to the previous examinations.   3.  Postoperative changes consistent with colectomy and right lower quadrant ileostomy. Rectal remnant demonstrated. Previously demonstrated presacral fluid collection not identified.   4.  Small/moderate increased fluid within the abdomen and pelvis.   5.  Since previous examination, 5.3 x 6.4 cm multiseptated cystic structure/loculated fluid collection is located anterior to the uterus and posterior to the urinary bladder. Differential considerations include adnexal/ovarian cystic structure. Seroma pseudocyst, or abscess are not excluded.                                                                                              Diagnosis   Visit Diagnoses     ICD-10-CM   1. Lumbar radiculitis  M54.16   2. DDD (degenerative disc disease), lumbar  M51.36   3. Anxiety  F41.9           ASSESSMENT:  Gala Camarena 40 y.o. female seen for above     Gala was seen today for new patient.    Diagnoses and all orders for this visit:    Lumbar radiculitis  -     MR-LUMBAR SPINE-W/O; Future    DDD (degenerative disc disease), lumbar  -     MR-LUMBAR  SPINE-W/O; Future    Anxiety  -     diazePAM (VALIUM) 2 MG Tab; Take 1 Tab by mouth 1 time daily as needed for Anxiety (Take 1 tab 45 minutes prior to procedure, may repeat x1) for up to 1 day.        1. Caution about starting PT at this time.  Unclear if she will tolerate this yet.  Start with MRI lumbar spine.  2. Valium given to take prior to the MRI lumbar spine.        Follow-up: Return in about 4 weeks (around 9/25/2020).    Thank you very much for asking me to participate in Gala Camarena's care.  Please contact me with any questions or concerns.        Please note that this dictation was created using voice recognition software. I have made every reasonable attempt to correct obvious errors but there may be errors of grammar and content that I may have overlooked prior to finalization of this note.      Joshua Bauman MD  Physical Medicine and Rehabilitation  Interventional Spine and Sports Physiatry  [facility]

## 2020-09-03 ENCOUNTER — HOSPITAL ENCOUNTER (OUTPATIENT)
Dept: RADIOLOGY | Facility: MEDICAL CENTER | Age: 40
End: 2020-09-03
Attending: PHYSICAL MEDICINE & REHABILITATION
Payer: COMMERCIAL

## 2020-09-03 DIAGNOSIS — M51.36 DDD (DEGENERATIVE DISC DISEASE), LUMBAR: ICD-10-CM

## 2020-09-03 DIAGNOSIS — M54.16 LUMBAR RADICULITIS: ICD-10-CM

## 2020-09-03 PROCEDURE — 72148 MRI LUMBAR SPINE W/O DYE: CPT

## 2020-09-08 DIAGNOSIS — M51.24 THORACIC DISC HERNIATION: ICD-10-CM

## 2020-09-08 DIAGNOSIS — M54.50 LOW BACK PAIN WITH RADIATION: ICD-10-CM

## 2020-09-08 NOTE — RESULT ENCOUNTER NOTE
Please let Gala know that I have reviewed her MRI lumbar spine and there is a disc extrusion at T11-12 that is incompletely visualized and I have ordered an MRI thoracic spine to better see this disc herniation.  Thank you.

## 2020-09-17 ENCOUNTER — HOSPITAL ENCOUNTER (OUTPATIENT)
Dept: RADIOLOGY | Facility: MEDICAL CENTER | Age: 40
End: 2020-09-17
Attending: PHYSICAL MEDICINE & REHABILITATION
Payer: COMMERCIAL

## 2020-09-17 DIAGNOSIS — M51.24 THORACIC DISC HERNIATION: ICD-10-CM

## 2020-09-17 DIAGNOSIS — M54.50 LOW BACK PAIN WITH RADIATION: ICD-10-CM

## 2020-09-17 PROCEDURE — 72146 MRI CHEST SPINE W/O DYE: CPT

## 2020-09-25 ENCOUNTER — OFFICE VISIT (OUTPATIENT)
Dept: PHYSICAL MEDICINE AND REHAB | Facility: MEDICAL CENTER | Age: 40
End: 2020-09-25
Payer: COMMERCIAL

## 2020-09-25 VITALS
HEART RATE: 84 BPM | OXYGEN SATURATION: 99 % | SYSTOLIC BLOOD PRESSURE: 122 MMHG | HEIGHT: 63 IN | DIASTOLIC BLOOD PRESSURE: 70 MMHG | BODY MASS INDEX: 27.62 KG/M2 | WEIGHT: 155.87 LBS | TEMPERATURE: 98.3 F

## 2020-09-25 DIAGNOSIS — M51.24 THORACIC DISC HERNIATION: ICD-10-CM

## 2020-09-25 DIAGNOSIS — M51.36 DDD (DEGENERATIVE DISC DISEASE), LUMBAR: ICD-10-CM

## 2020-09-25 DIAGNOSIS — K43.9 ABDOMINAL WALL HERNIA: ICD-10-CM

## 2020-09-25 DIAGNOSIS — M48.061 FORAMINAL STENOSIS OF LUMBAR REGION: ICD-10-CM

## 2020-09-25 DIAGNOSIS — M54.50 LOW BACK PAIN WITH RADIATION: ICD-10-CM

## 2020-09-25 PROCEDURE — 99214 OFFICE O/P EST MOD 30 MIN: CPT | Performed by: PHYSICAL MEDICINE & REHABILITATION

## 2020-09-25 ASSESSMENT — PAIN SCALES - GENERAL: PAINLEVEL: 4=SLIGHT-MODERATE PAIN

## 2020-09-25 ASSESSMENT — PATIENT HEALTH QUESTIONNAIRE - PHQ9: CLINICAL INTERPRETATION OF PHQ2 SCORE: 0

## 2020-09-25 NOTE — Clinical Note
There was a note of abdominal wall hernia on MRI thoracic spine.  Patient will follow-up with you regarding this.  Thanks.  Joshua Bauman MD

## 2020-09-25 NOTE — PROGRESS NOTES
Follow-up patient note    Physiatry (physical medicine and  Rehabilitation), interventional spine and sports medicine    Date of Service: 09/25/2020    Chief complaint:   Chief Complaint   Patient presents with   • Follow-Up     Lower back pain       HISTORY    HPI: Gala Camarena 40 y.o. female who presents today for evaluation of low back pain.      Gala returns for follow-up of her mid and low back pain.  She reports that she occasionally has some numbness in the her leg and foot, right greater than left.  She does have some aching pain in the mid-back, but does not have radicular symptoms in the chest or abdomen.  Pain is most aching across the lumbosacral junction.    Pain today is a 4/10 on the NRS.  She has been taking advil 800mg two to three times a day.  This does help with her pain significantly.  No side effects.  Most of the time, she does try to take this with food.    She continues to be cautious with lifting at work.  At times, pain makes it difficult for her to walk.  Sleep is okay, she gets about 6-7 hours a night.    Usually, pain is somewhat worse in the morning.  Using ice seems to help.    No bowel or bladder changes and no weakness in the legs.     Medical records review:  Dr. Robert Headley 08/13/2019 She was seen for right leg pain.  She was referred to physical therapy and physiatry at that time.    Previous treatments:    Physical Therapy: No    Medications the patient is tried: NSAIDs    Previous interventions: none     Previous surgeries to relieve the above pain:  none      ROS: Unchanged from 08/28/2020  GI: Ulcerative colitis, s/p colon resection 2014  Endo: Complete thyroidectomy  Red Flags ROS:   Fever, Chills, Sweats: Denies  Involuntary Weight Loss: Denies  Bladder Incontinence: Denies  Bowel Incontinence: Denies  Saddle Anesthesia: Denies    All other systems reviewed and negative.       PMHx:   Past Medical History:   Diagnosis Date   • Adrenal insufficiency (HCC) 2014     21-hydroxylase antibody = neg   • Anxiety     in remission   • Blood transfusion, without reported diagnosis 8/2013   • Chronic erosive gastritis 8/14    Dr Mercedes   • Depression     in remission   • GERD (gastroesophageal reflux disease)     omeprazole   • Headache, classical migraine 11/2014    since 8yoa, triggers=unk, imitrex works well.  freq=3-4x/month.     • Heart burn    • Hypercalcemia 2014   • Hypothyroidism, postsurgical    • IgA gammopathy 2014   • Indigestion    • Kidney disease     hx of pyelo, outpat tx   • Mixed anxiety and depressive disorder 2014   • Multiple thyroid nodules 7/31/2017   • Opioid dependence (HCC) 2014    hx of heavy use of dilaudid   • Ovarian cyst    • Pain 05-08-15    abd., rectum, 3/10   • Pancreatitis 2014   • S/P total colectomy 2014   • Sciatica 8/26/2019   • Thyrotoxicosis 2015    hyper, on methimazole, sees endo   • Type II or unspecified type diabetes mellitus without mention of complication, not stated as uncontrolled 2/2014    NIDDM, sees endo.  no GDM.  diet controlled    • Ulcerative colitis, chronic (HCC)        PSHx:   Past Surgical History:   Procedure Laterality Date   • THYROIDECTOMY TOTAL  10/31/2018    Procedure: THYROIDECTOMY TOTAL- OR NEAR TOTAL;  Surgeon: Joshua Douglass M.D.;  Location: SURGERY SAME DAY [facility];  Service: General   • THYROIDECTOMY TOTAL  2018    Thyrotoxicosis and benign nodules   • TRANSANAL PARTIAL PROCTECTOMY  5/20/2015    Procedure: TRANSANAL PARTIAL PROCTECTOMY Ileoanal J Pouch;  Surgeon: Smith Granado M.D.;  Location: SURGERY [facility];  Service:    • LOW ANTERIOR RESECTION  5/20/2015    Procedure: LOW ANTERIOR RESECTION;  Surgeon: Smith Granado M.D.;  Location: SURGERY [facility];  Service:    • EGD WITH ASP/BX  8/14     [facility]   • EXPLORATORY LAPAROTOMY  5/19/2014    Performed by Smith Granado M.D. at [facility]   • ILEO LOOP DIVERSION  5/19/2014    Performed by Smith Granado M.D. at [facility]  [facility]   • GASTROSCOPY-ENDO  5/14/2014    Performed by Celio Hill M.D. at ENDOSCOPY [facility]   • SIGMOIDOSCOPY FLEX  4/6/2014    Performed by Mauro Mercedes Jr., M.D. at ENDOSCOPY [facility]   • COLECTOMY  3/19/2014    Performed by Smith Granado M.D. at SURGERY [facility]   • ILEOSTOMY  3/19/2014    Performed by Smith Granado M.D. at SURGERY [facility]   • COLONOSCOPY - ENDO  3/4/2014    Performed by Hernan Luna M.D. at SURGERY [facility]   • CHOLECYSTECTOMY  2014   • OVARIAN CYSTECTOMY      2007       Family history   Family History   Problem Relation Age of Onset   • Hypertension Mother    • Diabetes Mother    • Cancer Maternal Grandmother         Ovarian   • Heart Disease Maternal Grandfather         chf   • Hypertension Maternal Aunt          Medications:   Current Outpatient Medications   Medication   • hydrocortisone (CORTEF) 10 MG Tab   • SUMAtriptan (IMITREX) 50 MG Tab   • Calcium Carb-Cholecalciferol (OYSTER SHELL CALCIUM/VITAMIN D) 250-125 MG-UNIT Tab tablet   • Ibuprofen (ADVIL) 200 MG CAPS   • levothyroxine (SYNTHROID) 137 MCG Tab   • levothyroxine (SYNTHROID) 125 MCG Tab   • omeprazole (PRILOSEC) 40 MG delayed-release capsule     No current facility-administered medications for this visit.        Allergies:   Allergies   Allergen Reactions   • Ativan      Hallucinations, restless   • Compazine Palpitations     Hypertension   • Prochlorperazine Palpitations     \"Compazine\"; severe HTN   • Tape Rash     Paper tape and tegaderm is okay.       Social Hx:   Social History     Socioeconomic History   • Marital status:      Spouse name: Not on file   • Number of children: Not on file   • Years of education: Not on file   • Highest education level: Not on file   Occupational History   • Not on file   Social Needs   • Financial resource strain: Not on file   • Food insecurity     Worry: Not on file     Inability: Not on file   • Transportation needs     Medical: Not " "on file     Non-medical: Not on file   Tobacco Use   • Smoking status: Current Some Day Smoker     Packs/day: 0.00     Years: 13.00     Pack years: 0.00     Types: Cigarettes   • Smokeless tobacco: Never Used   Substance and Sexual Activity   • Alcohol use: No     Comment: *social smoking    • Drug use: No   • Sexual activity: Never     Birth control/protection: I.U.D.     Comment: mirena 8/2013   Lifestyle   • Physical activity     Days per week: Not on file     Minutes per session: Not on file   • Stress: Not on file   Relationships   • Social connections     Talks on phone: Not on file     Gets together: Not on file     Attends Jewish service: Not on file     Active member of club or organization: Not on file     Attends meetings of clubs or organizations: Not on file     Relationship status: Not on file   • Intimate partner violence     Fear of current or ex partner: Not on file     Emotionally abused: Not on file     Physically abused: Not on file     Forced sexual activity: Not on file   Other Topics Concern   •  Service No   • Blood Transfusions Yes   • Caffeine Concern No   • Occupational Exposure Yes   • Hobby Hazards No   • Sleep Concern No   • Stress Concern No   • Weight Concern Yes   • Special Diet No   • Back Care No   • Exercise No   • Bike Helmet No   • Seat Belt Yes   • Self-Exams Yes   Social History Narrative   • Not on file         EXAMINATION     Physical Exam:   Vitals: /70 (BP Location: Right arm, Patient Position: Sitting, BP Cuff Size: Adult long)   Pulse 84   Temp 36.8 °C (98.3 °F) (Temporal)   Ht 1.6 m (5' 3\")   Wt 70.7 kg (155 lb 13.8 oz)   SpO2 99%     Constitutional:   Body Habitus: Body mass index is 27.61 kg/m².  Cooperation: Fully cooperates with exam  Appearance: Well-groomed, well-nourished, not disheveled, in no acute distress    Eyes: No scleral icterus, no proptosis     ENT -no obvious auditory deficits, wearing a face mask    Skin -no rashes or lesions " noted     Respiratory-  breathing comfortable on room air, no audible wheezing    Cardiovascular- No lower extremity edema is noted.     Psychiatric- alert and oriented ×3. Normal affect.     Gait - normal gait, no use of ambulatory device, nonantalgic.     Musculoskeletal -     Thoracic/Lumbar Spine/Sacral Spine/Hips   Inspection: No evidence of atrophy in bilateral lower extremities throughout     ROM: mildly decreased AROM with flexion, extension, lateral flexion bilaterally, with pain in extension and quadrant loading on the left and right causing pain in the left, no sweating noted after ROM    Palpation:   No tenderness to palpation in midline at T1-T12 levels. No tenderness to palpation in the left and right of the midline T1-L5, POSITIVE for mild tenderness to palpation to the para-midline region in the lower lumbar levels.  palpation over SI joint: negative bilaterally  palpation over buttock: negative bilaterally   palpation in hip or over the greater trochanters: negative bilaterally      Lumbar spine Special tests  Neuro tension  Seated straight leg test negative bilaterally    Neuro     Key points for the international standards for neurological classification of spinal cord injury (ISNCSCI) to light touch.     Dermatome R L   L2 2 2   L3 2 2   L4 2 2   L5 2 2   S1 2 2   S2 2 2       Motor Exam Lower Extremities    ? Myotome R L   Hip flexion L2 5 5   Knee extension L3 5 5   Ankle dorsiflexion L4 5 5   Toe extension L5 4+ 5   Ankle plantarflexion S1 5 5       Reflexes  ?  R L   Patella  2+ 2+   Achilles   1+ 2+       MEDICAL DECISION MAKING    Medical records review: see under HPI section.     DATA    Labs:   Lab Results   Component Value Date/Time    SODIUM 140 09/18/2019 01:14 PM    POTASSIUM 3.5 (L) 09/18/2019 01:14 PM    CHLORIDE 105 09/18/2019 01:14 PM    CO2 27 09/18/2019 01:14 PM    ANION 8.0 09/18/2019 01:14 PM    GLUCOSE 107 (H) 09/18/2019 01:14 PM    BUN 13 09/18/2019 01:14 PM    CREATININE  1.16 09/18/2019 01:14 PM    CALCIUM 9.4 09/18/2019 01:14 PM    ASTSGOT 17 09/06/2018 01:52 PM    ALTSGPT 18 09/06/2018 01:52 PM    TBILIRUBIN 0.4 09/06/2018 01:52 PM    ALBUMIN 3.9 02/01/2019 02:26 PM    ALBUMIN 3.28 (L) 08/18/2014 12:04 PM    TOTPROTEIN 6.5 09/06/2018 01:52 PM    TOTPROTEIN 7.20 08/18/2014 12:04 PM    GLOBULIN 2.7 09/06/2018 01:52 PM    AGRATIO 1.4 09/06/2018 01:52 PM       Lab Results   Component Value Date/Time    PROTHROMBTM 14.3 05/09/2014 08:30 AM    INR 1.12 05/09/2014 08:30 AM        Lab Results   Component Value Date/Time    WBC 8.7 10/25/2018 11:18 AM    RBC 5.14 10/25/2018 11:18 AM    HEMOGLOBIN 14.8 10/25/2018 11:18 AM    HEMATOCRIT 45.9 10/25/2018 11:18 AM    MCV 89.3 10/25/2018 11:18 AM    MCH 28.8 10/25/2018 11:18 AM    MCHC 32.2 (L) 10/25/2018 11:18 AM    MPV 9.5 10/25/2018 11:18 AM    NEUTSPOLYS 72.80 (H) 10/25/2018 11:18 AM    LYMPHOCYTES 20.00 (L) 10/25/2018 11:18 AM    MONOCYTES 5.60 10/25/2018 11:18 AM    EOSINOPHILS 1.00 10/25/2018 11:18 AM    EOSINOPHILS 2 06/04/2014 11:45 AM    BASOPHILS 0.30 10/25/2018 11:18 AM    HYPOCHROMIA 1+ 08/11/2014 02:56 PM    ANISOCYTOSIS 1+ 05/12/2014 12:40 PM        Lab Results   Component Value Date/Time    HBA1C 6.7 (H) 09/06/2018 01:52 PM        Imaging: I personally reviewed following images, these are my reads  MRI thoracic spine 09/17/2020  There is note of a right paracentral disc protrusion at T11-12.  This does efface the spinal cord.  No central or foraminal stenosis.  Otherwise, there is note of a small disc protrusion at T7-8 without central or foraminal stenosis.  No other significant abnormalities in the remainder of the thoracic spine.    MRI lumbar spine 09/17/2020  There is mild decreased disc height at L5-S1 with mild bilateral foraminal stenosis.  No central canal stenosis.  No central or foraminal canal stenosis from L1-2 through L4-5.      CT abdomen/pelvis with contrast 09/03/2014 with focus on lumbar spine  There is  degenerative disc disease greatest at L5-S1    IMAGING radiology reads. I reviewed the following radiology reads     MRI thoracic spine 09/17/2020  IMPRESSION:  1.  Right paracentral disc protrusion at T11-12 which effaces the CSF space anterior to the spinal cord. No central canal or neural foraminal narrowing.  2.  Annular disc bulge with a small central disc protrusion at T7-8. No central canal or neural foraminal narrowing.  3.  No evidence of thoracic spine fracture.      MRI lumbar spine 09/03/2020  IMPRESSION:     1.  T11/12 right paracentral extrusion indents the ventral right aspect of the cord but no cord signal abnormality is detected     2.  Otherwise minimal degenerative disc disease with at most borderline left L5/S1 foraminal stenosis     3.  Abdominal wall hernia    CT abdomen/pelvis with contrast 09/03/2014     Impression       1.  Left lower lobe atelectasis/possible pneumonia and small left pleural effusion.   2.  No pulmonary nodules within the right lung base identified. The right lung base is incompletely imaged as compared to the previous examinations.   3.  Postoperative changes consistent with colectomy and right lower quadrant ileostomy. Rectal remnant demonstrated. Previously demonstrated presacral fluid collection not identified.   4.  Small/moderate increased fluid within the abdomen and pelvis.   5.  Since previous examination, 5.3 x 6.4 cm multiseptated cystic structure/loculated fluid collection is located anterior to the uterus and posterior to the urinary bladder. Differential considerations include adnexal/ovarian cystic structure. Seroma pseudocyst, or abscess are not excluded.                                                                                              Diagnosis   Visit Diagnoses     ICD-10-CM   1. DDD (degenerative disc disease), lumbar  M51.36   2. Low back pain with radiation  M54.40   3. Foraminal stenosis of lumbar region  M48.061   4. Thoracic disc  herniation  M51.24   5. Abdominal wall hernia  K43.9           ASSESSMENT:  Gala Camarena 40 y.o. female seen for above     Gala was seen today for follow-up.    Diagnoses and all orders for this visit:    DDD (degenerative disc disease), lumbar  -     REFERRAL TO PHYSICAL THERAPY Reason for Therapy: Eval/Treat/Report    Low back pain with radiation  -     REFERRAL TO PHYSICAL THERAPY Reason for Therapy: Eval/Treat/Report    Foraminal stenosis of lumbar region  -     REFERRAL TO PHYSICAL THERAPY Reason for Therapy: Eval/Treat/Report    Thoracic disc herniation  -     REFERRAL TO PHYSICAL THERAPY Reason for Therapy: Eval/Treat/Report    Abdominal wall hernia  Comments:  Noted on MRI thoracic spine        1. Reviewed MRI of the thoracic and lumbar spine.  She does have a right paracentral disc herniation at T11-12 and borderline foraminal stenosis at L5-S1 bilaterally  2. Discussed trial of PT.  Her exam is improved from previous visit.  If PT is not tolerated or if symptoms worsen, we will consider injections.  She will let me know if symptoms worsen with PT.  3. We reviewed findings of abdominal wall hernia noted on thoracic MRI.  We discussed that I will CC: Dr. Granado and she can follow-up with him regarding this finding and any need for further work-up and treatment.        Follow-up: Return in about 2 months (around 11/25/2020).      Please note that this dictation was created using voice recognition software. I have made every reasonable attempt to correct obvious errors but there may be errors of grammar and content that I may have overlooked prior to finalization of this note.      Joshua Bauman MD  Physical Medicine and Rehabilitation  Interventional Spine and Sports Physiatry  [facility]    CC: Dr. Granado

## 2020-10-06 ENCOUNTER — IMMUNIZATION (OUTPATIENT)
Dept: SOCIAL WORK | Facility: CLINIC | Age: 40
End: 2020-10-06
Payer: COMMERCIAL

## 2020-10-06 DIAGNOSIS — Z23 NEED FOR VACCINATION: ICD-10-CM

## 2020-10-06 PROCEDURE — 90686 IIV4 VACC NO PRSV 0.5 ML IM: CPT | Performed by: REGISTERED NURSE

## 2020-10-06 PROCEDURE — 90471 IMMUNIZATION ADMIN: CPT | Performed by: REGISTERED NURSE

## 2020-10-16 ENCOUNTER — PHYSICAL THERAPY (OUTPATIENT)
Dept: PHYSICAL THERAPY | Facility: REHABILITATION | Age: 40
End: 2020-10-16
Attending: PHYSICAL MEDICINE & REHABILITATION
Payer: COMMERCIAL

## 2020-10-16 DIAGNOSIS — M51.36 DDD (DEGENERATIVE DISC DISEASE), LUMBAR: ICD-10-CM

## 2020-10-16 DIAGNOSIS — M48.061 FORAMINAL STENOSIS OF LUMBAR REGION: ICD-10-CM

## 2020-10-16 DIAGNOSIS — M51.24 THORACIC DISC HERNIATION: ICD-10-CM

## 2020-10-16 DIAGNOSIS — M54.50 LOW BACK PAIN WITH RADIATION: ICD-10-CM

## 2020-10-16 PROCEDURE — 97110 THERAPEUTIC EXERCISES: CPT

## 2020-10-16 PROCEDURE — 97161 PT EVAL LOW COMPLEX 20 MIN: CPT

## 2020-10-16 SDOH — ECONOMIC STABILITY: GENERAL: QUALITY OF LIFE: FAIR

## 2020-10-16 ASSESSMENT — ENCOUNTER SYMPTOMS
PAIN SCALE: 0
PAIN SCALE AT LOWEST: 0
PAIN SCALE AT HIGHEST: 10

## 2020-10-16 NOTE — OP THERAPY EVALUATION
Outpatient Physical Therapy  INITIAL EVALUATION    [facility] [place]  [address]  [place] [address]  Phone:  [phone]  Fax:  [phone]    Date of Evaluation: 10/16/2020    Patient: Gala Camarena  YOB: 1980  MRN: [id number]     Referring Provider: Joshua Bauman M.D.  [id number]  [address]  [place] [address]   Referring Diagnosis DDD (degenerative disc disease), lumbar [M51.36];Low back pain with radiation [M54.5];Foraminal stenosis of lumbar region [M48.061];Thoracic disc herniation [M51.24]     Time Calculation    Start time: 0900  Stop time: 1000 Time Calculation (min): 60 minutes         Chief Complaint: Back Problem    Visit Diagnoses     ICD-10-CM   1. DDD (degenerative disc disease), lumbar  M51.36   2. Low back pain with radiation  M54.5   3. Foraminal stenosis of lumbar region  M48.061   4. Thoracic disc herniation  M51.24         Subjective:   History of Present Illness:     Date of onset:  10/16/2018  Quality of life:  Fair  Prior level of function:  Ongoing chronic LBP as of 2 years ago  Pain:     Current pain ratin (neck 2)    At best pain ratin    At worst pain rating:  10  Diagnostic Tests:     X-ray: abnormal      MRI studies: abnormal    Activities of Daily Living:     Patient reported ADL status: Limited ability to walk her dogs due to the pain from pulling  Limited participation in fitness activity  Decreased ADL participation  Limited with low transfers  Patient Goals:     Patient goals for therapy:  Increased strength, decreased pain and increased motion    Patient is a 40 y.o. female that presents to therapy with upper back and lower back pain. States that symptoms were insidious in onset. Reports the pain quality to be sharp/dull, constant and are primarily in the L glut with pain that moves from L to R. Reports that symptoms now not changing. States that aggravating factors are laying flat, prolonged standing,  sitting. States that easng factors are ice, meds, walk. Numbness and tingling sporadic in the LE B.    Past Medical History:   Diagnosis Date   • Adrenal insufficiency (HCC) 2014    21-hydroxylase antibody = neg   • Anxiety     in remission   • Blood transfusion, without reported diagnosis 8/2013   • Chronic erosive gastritis 8/14    Dr Mercedes   • Depression     in remission   • GERD (gastroesophageal reflux disease)     omeprazole   • Headache, classical migraine 11/2014    since 8yoa, triggers=unk, imitrex works well.  freq=3-4x/month.     • Heart burn    • Hypercalcemia 2014   • Hypothyroidism, postsurgical    • IgA gammopathy 2014   • Indigestion    • Kidney disease     hx of pyelo, outpat tx   • Mixed anxiety and depressive disorder 2014   • Multiple thyroid nodules 7/31/2017   • Opioid dependence (HCC) 2014    hx of heavy use of dilaudid   • Ovarian cyst    • Pain 05-08-15    abd., rectum, 3/10   • Pancreatitis 2014   • S/P total colectomy 2014   • Sciatica 8/26/2019   • Thyrotoxicosis 2015    hyper, on methimazole, sees endo   • Type II or unspecified type diabetes mellitus without mention of complication, not stated as uncontrolled 2/2014    NIDDM, sees endo.  no GDM.  diet controlled    • Ulcerative colitis, chronic (HCC)      Past Surgical History:   Procedure Laterality Date   • THYROIDECTOMY TOTAL  10/31/2018    Procedure: THYROIDECTOMY TOTAL- OR NEAR TOTAL;  Surgeon: Joshua Douglass M.D.;  Location: SURGERY SAME DAY [facility];  Service: General   • THYROIDECTOMY TOTAL  2018    Thyrotoxicosis and benign nodules   • TRANSANAL PARTIAL PROCTECTOMY  5/20/2015    Procedure: TRANSANAL PARTIAL PROCTECTOMY Ileoanal J Pouch;  Surgeon: Smith Granado M.D.;  Location: SURGERY [facility];  Service:    • LOW ANTERIOR RESECTION  5/20/2015    Procedure: LOW ANTERIOR RESECTION;  Surgeon: Smith Granado M.D.;  Location: SURGERY [facility];  Service:    • EGD WITH ASP/BX  8/14     [facility]   • EXPLORATORY  LAPAROTOMY  5/19/2014    Performed by Smith Granado M.D. at SURGERY [facility]   • ILEO LOOP DIVERSION  5/19/2014    Performed by Smith Granado M.D. at SURGERY [facility]   • GASTROSCOPY-ENDO  5/14/2014    Performed by Celio Hill M.D. at ENDOSCOPY [facility]   • SIGMOIDOSCOPY FLEX  4/6/2014    Performed by Mauro Mercedes Jr., M.D. at ENDOSCOPY [facility]   • COLECTOMY  3/19/2014    Performed by Smith Granado M.D. at SURGERY [facility]   • ILEOSTOMY  3/19/2014    Performed by Smith Granado M.D. at SURGERY [facility]   • COLONOSCOPY - ENDO  3/4/2014    Performed by Hernan Luna M.D. at SURGERY [facility]   • CHOLECYSTECTOMY  2014   • OVARIAN CYSTECTOMY      2007     Social History     Tobacco Use   • Smoking status: Current Some Day Smoker     Packs/day: 0.00     Years: 13.00     Pack years: 0.00     Types: Cigarettes   • Smokeless tobacco: Never Used   Substance Use Topics   • Alcohol use: No     Comment: *social smoking      Family and Occupational History     Socioeconomic History   • Marital status:      Spouse name: Not on file   • Number of children: Not on file   • Years of education: Not on file   • Highest education level: Not on file   Occupational History   • Not on file       Objective     Postural Observations  Seated posture: poor  Standing posture: poor        Neurological Testing     Reflexes   Left   Patellar (L4): normal (2+)  Achilles (S1): normal (2+)  Ankle clonus reflex: negative  Babinski sign: negative    Right   Patellar (L4): normal (2+)  Achilles (S1): normal (2+)  Ankle clonus reflex: negative  Babinski sign: negative    Myotome testing   Lumbar (left)   L1 (hip flexors): 5  L2 (hip flexors): 5  L3 (knee extensors): 5  L4 (ankle dorsiflexors): 5  L5 (great toe extension): 5  S1 (ankle plantar flexors): 5    Lumbar (right)   L1 (hip flexors): 4  L2 (hip flexors): 4  L3 (knee extensors): 5  L4 (ankle dorsiflexors): 5  L5 (great toe extension):  5  S1 (ankle plantar flexors): 5    Dermatome testing   Lumbar (left)   All left lumbar dermatomes intact        Additional Neurological Details  Slight decreased sensation LT on R below knee non dermatomal     Palpation   Left   No palpable tenderness to the lumbar paraspinals.   Hypertonic in the gluteus medius and quadratus lumborum.     Right   No palpable tenderness to the lumbar paraspinals.   Hypertonic in the gluteus medius and quadratus lumborum.     Active Range of Motion     Lumbar   Flexion: Lumbar active flexion: 105deg.  Extension: Lumbar active extension: 32deg.  Left lateral flexion: Left lateral lumbar spine flexion: 40deg.  Right lateral flexion: Right lateral lumbar spine flexion: 44deg.  Left Hip   Flexion: WFL  External rotation (90/90): WFL  Internal rotation (90/90): WFL    Right Hip   Flexion: WFL  External rotation (90/90): WFL  Internal rotation (90/90): WFL    Strength:      Left Hip   Planes of Motion   Abduction: 4  Adduction: 4+    Right Hip   Planes of Motion   Abduction: 4  Adduction: 4+    Left Knee   Flexion: 5    Right Knee   Flexion: 4-    Tests     Left Hip   Positive SI compression and SI distraction.   SLR: Positive.     Right Hip   Positive SI compression and SI distraction.   SLR: Negative.     Additional Tests Details  Traction (-)  Poor tolerance of supine position        Therapeutic Exercises (CPT 41505):     1. Basic tra x10min     2. Edu re: importance of stability      Time-based treatments/modalities:    Physical Therapy Timed Treatment Charges  Therapeutic exercise minutes (CPT 34473): 10 minutes      Assessment, Response and Plan:   Impairments: abnormal or restricted ROM, activity intolerance, difficulty performing job, impaired physical strength, limited mobility and pain with function    Assessment details:  Patient presents with signs and symptoms consistent with functional instability and a likely lumbar radiculopathy. Patient limitations include weakness,  decreased ROM, and pain. Patient demonstrated poor control with transfers and . Patient will benefit from skilled therapy to improve the aforementioned deficits and decrease further functional decline.   Prognosis comment:  Fair to poor  Goals:   Short Term Goals:   1) Patient's symptoms will improve to facilitate supine to sit transfer with no increase in pain.  2) Patient's symptoms will improve to facilitate lifting 20lbs without an increase in pain.  Short term goal time span:  2-4 weeks      Long Term Goals:    1) Patient's symptoms will improve to allow for return to a fitness program.  2) Patient's LBDI will improve by 15 to demonstrate functional improvement  Long term goal time span:  6-8 weeks    Plan:   Therapy options:  Physical therapy treatment to continue  Planned therapy interventions:  E Stim Unattended (CPT 67826), Hot or Cold Pack Therapy (CPT 61946), Manual Therapy (CPT 50160), Neuromuscular Re-education (CPT 75797) and Therapeutic Exercise (CPT 60915)  Frequency:  2x week  Duration in weeks:  6  Discussed with:  Patient      Functional Assessment Used  PT Functional Assessment Tool Used: Low Back Disability Questionnaire  PT Functional Assessment Score: 48.6     Referring provider co-signature:  I have reviewed this plan of care and my co-signature certifies the need for services.    Certification Period: 10/16/2020 to  11/27/20    Physician Signature: ________________________________ Date: ______________

## 2020-10-21 ENCOUNTER — APPOINTMENT (OUTPATIENT)
Dept: PHYSICAL THERAPY | Facility: REHABILITATION | Age: 40
End: 2020-10-21
Attending: PHYSICAL MEDICINE & REHABILITATION
Payer: COMMERCIAL

## 2020-10-23 ENCOUNTER — PHYSICAL THERAPY (OUTPATIENT)
Dept: PHYSICAL THERAPY | Facility: REHABILITATION | Age: 40
End: 2020-10-23
Attending: PHYSICAL MEDICINE & REHABILITATION
Payer: COMMERCIAL

## 2020-10-23 DIAGNOSIS — M48.061 FORAMINAL STENOSIS OF LUMBAR REGION: ICD-10-CM

## 2020-10-23 DIAGNOSIS — M51.36 DDD (DEGENERATIVE DISC DISEASE), LUMBAR: ICD-10-CM

## 2020-10-23 DIAGNOSIS — M51.24 THORACIC DISC HERNIATION: ICD-10-CM

## 2020-10-23 DIAGNOSIS — M54.50 LOW BACK PAIN WITH RADIATION: ICD-10-CM

## 2020-10-23 PROCEDURE — 97110 THERAPEUTIC EXERCISES: CPT

## 2020-10-23 NOTE — OP THERAPY DAILY TREATMENT
Outpatient Physical Therapy  DAILY TREATMENT     [facility] Outpatient Physical Therapy [place]  [address], [address]  [facility] [address]  Phone:  [phone]  Fax:  [phone]    Date: 10/23/2020    Patient: Gala Camarena  YOB: 1980  MRN: [id number]     Time Calculation    Start time: 1030  Stop time: 1100 Time Calculation (min): 30 minutes         Chief Complaint: Back Problem    Visit #: 2    SUBJECTIVE:  Patient reports no change since evaluation.     OBJECTIVE:  Current objective measures:           Therapeutic Exercises (CPT 80664):     1. Nu Step, x8min L5    2. Shuttle, x4min, L5c    3. 1/2 foam roller balance, x3min    4. Foam pad ballance, x3min      Therapeutic Exercise Summary:  Access Code: [email or web address]     Exercises Tra - 10 reps - 2 sets - 1x daily - 7x weekly   Clamshell - 10 reps - 2 sets - 1x daily - 7x weekly   Modified Elijah Stretch - 3 reps - 1 sets - 15sec hold - 3x daily - 7x weekly         Time-based treatments/modalities:    Physical Therapy Timed Treatment Charges  Therapeutic exercise minutes (CPT 72598): 30 minutes      Pain rating (1-10) before treatment:  4  Pain rating (1-10) after treatment:  3    ASSESSMENT:   Response to treatment: Patient responded well to therapy with an overall decrease in pain and increase in fatigue.     PLAN/RECOMMENDATIONS:   Plan for treatment: therapy treatment to continue next visit.  Planned interventions for next visit: continue with current treatment.

## 2020-10-30 ENCOUNTER — PHYSICAL THERAPY (OUTPATIENT)
Dept: PHYSICAL THERAPY | Facility: REHABILITATION | Age: 40
End: 2020-10-30
Attending: NURSE PRACTITIONER
Payer: COMMERCIAL

## 2020-10-30 DIAGNOSIS — M51.36 DDD (DEGENERATIVE DISC DISEASE), LUMBAR: ICD-10-CM

## 2020-10-30 PROCEDURE — 97110 THERAPEUTIC EXERCISES: CPT

## 2020-10-30 NOTE — OP THERAPY DAILY TREATMENT
Outpatient Physical Therapy  DAILY TREATMENT     [facility] Outpatient Physical Therapy [place]  [address]  [place] [facility] [address]  Phone:  [phone]  Fax:  [phone]    Date: 10/30/2020    Patient: Gala Camarena  YOB: 1980  MRN: [id number]     Time Calculation    Start time: 1100  Stop time: 1130 Time Calculation (min): 30 minutes         Chief Complaint: Hip Problem and Back Problem    Visit #: 3    SUBJECTIVE:  Patient reports decreased compliance with her HEP. Notes that she over did it at work.     OBJECTIVE:  Current objective measures:           Therapeutic Exercises (CPT 64313):     1. Nu Step, x8min L5    2. Shuttle, x4min, L5c    3. 1/2 foam roller balance, x3min    4. Foam pad ballance, x3min      Therapeutic Exercise Summary:  Access Code: [email or web address]       Exercises Tra - 10 reps - 2 sets - 1x daily - 7x weekly   Clamshell - 10 reps - 2 sets - 1x daily - 7x weekly   Modified Elijah Stretch - 3 reps - 1 sets - 15sec hold - 3x daily - 7x weekly   Standing Row with Anchored Resistance - 10 reps - 2 sets - 1x daily - 7x weekly   Shoulder Extension with Resistance - 10 reps - 2 sets - 1x daily - 7x weekly         Time-based treatments/modalities:    Physical Therapy Timed Treatment Charges  Therapeutic exercise minutes (CPT 50267): 30 minutes      Pain rating (1-10) before treatment:  4  Pain rating (1-10) after treatment:  4    ASSESSMENT:   Response to treatment: Patient responded fair to therapy with a slight increase in fatigue with no change in pain.     PLAN/RECOMMENDATIONS:   Plan for treatment: therapy treatment to continue next visit.  Planned interventions for next visit: continue with current treatment.

## 2020-11-06 ENCOUNTER — PHYSICAL THERAPY (OUTPATIENT)
Dept: PHYSICAL THERAPY | Facility: REHABILITATION | Age: 40
End: 2020-11-06
Attending: NURSE PRACTITIONER
Payer: COMMERCIAL

## 2020-11-06 ENCOUNTER — TELEPHONE (OUTPATIENT)
Dept: PHYSICAL MEDICINE AND REHAB | Facility: MEDICAL CENTER | Age: 40
End: 2020-11-06

## 2020-11-06 DIAGNOSIS — M54.50 LOW BACK PAIN WITH RADIATION: ICD-10-CM

## 2020-11-06 DIAGNOSIS — M51.36 DDD (DEGENERATIVE DISC DISEASE), LUMBAR: ICD-10-CM

## 2020-11-06 DIAGNOSIS — M51.24 THORACIC DISC HERNIATION: ICD-10-CM

## 2020-11-06 PROCEDURE — 97110 THERAPEUTIC EXERCISES: CPT

## 2020-11-06 PROCEDURE — 97014 ELECTRIC STIMULATION THERAPY: CPT

## 2020-11-06 RX ORDER — METHYLPREDNISOLONE 4 MG/1
TABLET ORAL
Qty: 21 TAB | Refills: 0 | Status: SHIPPED | OUTPATIENT
Start: 2020-11-06 | End: 2021-04-13

## 2020-11-06 NOTE — TELEPHONE ENCOUNTER
Patient called and she is asking if you can send an Rx for prednisone to take until her next appointment 11/13/2020 and also she stated she took in the past and works for her. [employer]

## 2020-11-07 NOTE — OP THERAPY DAILY TREATMENT
Outpatient Physical Therapy  DAILY TREATMENT     [facility] Outpatient Physical Therapy [place]  [address], [address]  [place] [address]  Phone:  [phone]  Fax:  [phone]    Date: 11/06/2020    Patient: Gala Camarena  YOB: 1980  MRN: [id number]     Time Calculation    Start time: 1300  Stop time: 1340 Time Calculation (min): 40 minutes         Chief Complaint: Back Problem    Visit #: 4    SUBJECTIVE:  Patient reports that symptoms are about the same. Notes that she will be getting an injection in a few days.     OBJECTIVE:  Current objective measures:           Therapeutic Exercises (CPT 77262):     1. Nu Step, x8min L5    2. Shuttle, x4min, L5c    3. 1/2 foam roller balance, x3min    4. Foam pad ballance, x3min      Therapeutic Exercise Summary:  Access Code: [email or web address]       Exercises Tra - 10 reps - 2 sets - 1x daily - 7x weekly   Clamshell - 10 reps - 2 sets - 1x daily - 7x weekly   Modified Elijah Stretch - 3 reps - 1 sets - 15sec hold - 3x daily - 7x weekly   Standing Row with Anchored Resistance - 10 reps - 2 sets - 1x daily - 7x weekly   Shoulder Extension with Resistance - 10 reps - 2 sets - 1x daily - 7x weekly       Therapeutic Treatments and Modalities:     1. E Stim Unattended (CPT 22073), IFC with CP to back x15min  hz    Time-based treatments/modalities:    Physical Therapy Timed Treatment Charges  Therapeutic exercise minutes (CPT 71341): 25 minutes      Pain rating (1-10) before treatment:  5  Pain rating (1-10) after treatment:  5    ASSESSMENT:   Response to treatment: Patient tolerated her exercise with no increase in pain but noted no decrease post IFC.     PLAN/RECOMMENDATIONS:   Plan for treatment: therapy treatment to continue next visit.  Planned interventions for next visit: continue with current treatment.

## 2020-11-07 NOTE — TELEPHONE ENCOUNTER
Please let her know that I have sent in a medrol dose darling for her.    Thank you,     Joshua Bauman MD

## 2020-11-13 ENCOUNTER — APPOINTMENT (OUTPATIENT)
Dept: PHYSICAL THERAPY | Facility: REHABILITATION | Age: 40
End: 2020-11-13
Attending: NURSE PRACTITIONER

## 2020-11-13 ENCOUNTER — OFFICE VISIT (OUTPATIENT)
Dept: PHYSICAL MEDICINE AND REHAB | Facility: MEDICAL CENTER | Age: 40
End: 2020-11-13
Payer: COMMERCIAL

## 2020-11-13 VITALS
TEMPERATURE: 97.6 F | DIASTOLIC BLOOD PRESSURE: 70 MMHG | BODY MASS INDEX: 28.05 KG/M2 | HEART RATE: 87 BPM | WEIGHT: 158.29 LBS | SYSTOLIC BLOOD PRESSURE: 118 MMHG | OXYGEN SATURATION: 99 % | HEIGHT: 63 IN

## 2020-11-13 DIAGNOSIS — R29.2: ICD-10-CM

## 2020-11-13 DIAGNOSIS — M51.36 DDD (DEGENERATIVE DISC DISEASE), LUMBAR: ICD-10-CM

## 2020-11-13 DIAGNOSIS — M54.2 CERVICALGIA: ICD-10-CM

## 2020-11-13 DIAGNOSIS — M51.24 THORACIC DISC HERNIATION: ICD-10-CM

## 2020-11-13 DIAGNOSIS — M62.838 MUSCLE SPASM: ICD-10-CM

## 2020-11-13 PROCEDURE — 99214 OFFICE O/P EST MOD 30 MIN: CPT | Performed by: PHYSICAL MEDICINE & REHABILITATION

## 2020-11-13 RX ORDER — METHOCARBAMOL 500 MG/1
1000 TABLET, FILM COATED ORAL 2 TIMES DAILY PRN
Qty: 60 TAB | Refills: 0 | Status: SHIPPED | OUTPATIENT
Start: 2020-11-13 | End: 2020-12-13

## 2020-11-13 RX ORDER — DULOXETIN HYDROCHLORIDE 60 MG/1
60 CAPSULE, DELAYED RELEASE ORAL DAILY
Qty: 30 CAP | Refills: 1 | Status: SHIPPED | OUTPATIENT
Start: 2020-11-13 | End: 2020-12-13

## 2020-11-13 RX ORDER — DULOXETIN HYDROCHLORIDE 30 MG/1
30 CAPSULE, DELAYED RELEASE ORAL DAILY
Qty: 7 CAP | Refills: 0 | Status: SHIPPED | OUTPATIENT
Start: 2020-11-13 | End: 2020-11-20

## 2020-11-13 ASSESSMENT — PATIENT HEALTH QUESTIONNAIRE - PHQ9
CLINICAL INTERPRETATION OF PHQ2 SCORE: 2
5. POOR APPETITE OR OVEREATING: 1 - SEVERAL DAYS
SUM OF ALL RESPONSES TO PHQ QUESTIONS 1-9: 5

## 2020-11-13 ASSESSMENT — PAIN SCALES - GENERAL: PAINLEVEL: 2=MINIMAL-SLIGHT

## 2020-11-13 NOTE — PROGRESS NOTES
Follow-up patient note    Physiatry (physical medicine and  Rehabilitation), interventional spine and sports medicine    Date of Service: 11/13/2020    Chief complaint:   Chief Complaint   Patient presents with   • Follow-Up     Back and neck pain       HISTORY    HPI: Gala Camarena 40 y.o. female who presents today for follow-up.    She reports that she has been having severe pain in her neck and this started about two weeks ago.  She feels like it radiates to the head and the front of her chest.  It feels like she has not identified anything that started the pain.  She denies any particular event leading to this.  She has been trying to limit her lifting at work.    She has had to go home from work.  No tripping or stumbles.     Pain ranges from 2-8/10 on the NRS.    Currently, she is not having pain in the midback and this improved significant after the medrol dose darling.    She is able to empty her bladder.  It is difficult to empty her bowel due to pain, but she is able to do this.       Medical records review:  Dr. Robert Headley 08/13/2019 She was seen for right leg pain.  She was referred to physical therapy and physiatry at that time.    Previous treatments:    Physical Therapy: No    Medications the patient is tried: NSAIDs    Previous interventions: none     Previous surgeries to relieve the above pain:  none      ROS: Unchanged from 09/25/2020 except as noted above  GI: Ulcerative colitis, s/p colon resection 2014  Endo: Complete thyroidectomy  Red Flags ROS:   Fever, Chills, Sweats: Denies  Involuntary Weight Loss: Denies  Bladder Incontinence: Denies  Bowel Incontinence: Denies  Saddle Anesthesia: Denies    All other systems reviewed and negative.       PMHx:   Past Medical History:   Diagnosis Date   • Adrenal insufficiency (HCC) 2014    21-hydroxylase antibody = neg   • Anxiety     in remission   • Blood transfusion, without reported diagnosis 8/2013   • Chronic erosive gastritis 8/14    Dr Mercedes    • Depression     in remission   • GERD (gastroesophageal reflux disease)     omeprazole   • Headache, classical migraine 11/2014    since 8yoa, triggers=unk, imitrex works well.  freq=3-4x/month.     • Heart burn    • Hypercalcemia 2014   • Hypothyroidism, postsurgical    • IgA gammopathy 2014   • Indigestion    • Kidney disease     hx of pyelo, outpat tx   • Mixed anxiety and depressive disorder 2014   • Multiple thyroid nodules 7/31/2017   • Opioid dependence (HCC) 2014    hx of heavy use of dilaudid   • Ovarian cyst    • Pain 05-08-15    abd., rectum, 3/10   • Pancreatitis 2014   • S/P total colectomy 2014   • Sciatica 8/26/2019   • Thyrotoxicosis 2015    hyper, on methimazole, sees endo   • Type II or unspecified type diabetes mellitus without mention of complication, not stated as uncontrolled 2/2014    NIDDM, sees endo.  no GDM.  diet controlled    • Ulcerative colitis, chronic (HCC)        PSHx:   Past Surgical History:   Procedure Laterality Date   • THYROIDECTOMY TOTAL  10/31/2018    Procedure: THYROIDECTOMY TOTAL- OR NEAR TOTAL;  Surgeon: Joshua Douglass M.D.;  Location: SURGERY SAME DAY [facility];  Service: General   • THYROIDECTOMY TOTAL  2018    Thyrotoxicosis and benign nodules   • TRANSANAL PARTIAL PROCTECTOMY  5/20/2015    Procedure: TRANSANAL PARTIAL PROCTECTOMY Ileoanal J Pouch;  Surgeon: Smith Granado M.D.;  Location: SURGERY [facility];  Service:    • LOW ANTERIOR RESECTION  5/20/2015    Procedure: LOW ANTERIOR RESECTION;  Surgeon: Smith Granado M.D.;  Location: SURGERY [facility];  Service:    • EGD WITH ASP/BX  8/14     Daren   • EXPLORATORY LAPAROTOMY  5/19/2014    Performed by Smith Granado M.D. at SURGERY [facility]   • ILEO LOOP DIVERSION  5/19/2014    Performed by Smith Granado M.D. at SURGERY [facility]   • GASTROSCOPY-ENDO  5/14/2014    Performed by Celio Hill M.D. at ENDOSCOPY [facility]   • SIGMOIDOSCOPY FLEX  4/6/2014    Performed by Mauro  "Daren Contreras M.D. at ENDOSCOPY [facility] ORS   • COLECTOMY  3/19/2014    Performed by Smith Granado M.D. at SURGERY [facility] ORS   • ILEOSTOMY  3/19/2014    Performed by Smith Granado M.D. at SURGERY [facility] ORS   • COLONOSCOPY - ENDO  3/4/2014    Performed by Hernan Luna M.D. at SURGERY [facility] ORS   • CHOLECYSTECTOMY  2014   • OVARIAN CYSTECTOMY      2007       Family history   Family History   Problem Relation Age of Onset   • Hypertension Mother    • Diabetes Mother    • Cancer Maternal Grandmother         Ovarian   • Heart Disease Maternal Grandfather         chf   • Hypertension Maternal Aunt          Medications:   Current Outpatient Medications   Medication   • methocarbamol (ROBAXIN) 500 MG Tab   • DULoxetine (CYMBALTA) 30 MG Cap DR Particles   • DULoxetine (CYMBALTA) 60 MG Cap DR Particles delayed-release capsule   • methylPREDNISolone (MEDROL DOSEPAK) 4 MG Tablet Therapy Pack   • levothyroxine (SYNTHROID) 137 MCG Tab   • hydrocortisone (CORTEF) 10 MG Tab   • levothyroxine (SYNTHROID) 125 MCG Tab   • SUMAtriptan (IMITREX) 50 MG Tab   • Calcium Carb-Cholecalciferol (OYSTER SHELL CALCIUM/VITAMIN D) 250-125 MG-UNIT Tab tablet   • Ibuprofen (ADVIL) 200 MG CAPS   • omeprazole (PRILOSEC) 40 MG delayed-release capsule     No current facility-administered medications for this visit.        Allergies:   Allergies   Allergen Reactions   • Ativan      Hallucinations, restless   • Compazine Palpitations     Hypertension   • Prochlorperazine Palpitations     \"Compazine\"; severe HTN   • Tape Rash     Paper tape and tegaderm is okay.       Social Hx:   Social History     Socioeconomic History   • Marital status:      Spouse name: Not on file   • Number of children: Not on file   • Years of education: Not on file   • Highest education level: Not on file   Occupational History   • Not on file   Social Needs   • Financial resource strain: Not on file   • Food insecurity     Worry: Not on file     " "Inability: Not on file   • Transportation needs     Medical: Not on file     Non-medical: Not on file   Tobacco Use   • Smoking status: Current Some Day Smoker     Packs/day: 0.00     Years: 13.00     Pack years: 0.00     Types: Cigarettes   • Smokeless tobacco: Never Used   Substance and Sexual Activity   • Alcohol use: No     Comment: *social smoking    • Drug use: Yes     Types: Marijuana   • Sexual activity: Never     Birth control/protection: I.U.D.     Comment: mirena 8/2013   Lifestyle   • Physical activity     Days per week: Not on file     Minutes per session: Not on file   • Stress: Not on file   Relationships   • Social connections     Talks on phone: Not on file     Gets together: Not on file     Attends Spiritism service: Not on file     Active member of club or organization: Not on file     Attends meetings of clubs or organizations: Not on file     Relationship status: Not on file   • Intimate partner violence     Fear of current or ex partner: Not on file     Emotionally abused: Not on file     Physically abused: Not on file     Forced sexual activity: Not on file   Other Topics Concern   •  Service No   • Blood Transfusions Yes   • Caffeine Concern No   • Occupational Exposure Yes   • Hobby Hazards No   • Sleep Concern No   • Stress Concern No   • Weight Concern Yes   • Special Diet No   • Back Care No   • Exercise No   • Bike Helmet No   • Seat Belt Yes   • Self-Exams Yes   Social History Narrative   • Not on file         EXAMINATION     Physical Exam:   Vitals: /70 (BP Location: Right arm, Patient Position: Sitting, BP Cuff Size: Small adult)   Pulse 87   Temp 36.4 °C (97.6 °F) (Temporal)   Ht 1.6 m (5' 3\")   Wt 71.8 kg (158 lb 4.6 oz)   SpO2 99%     Constitutional:   Body Habitus: Body mass index is 28.04 kg/m².  Cooperation: Fully cooperates with exam  Appearance: Well-groomed, well-nourished, not disheveled, in no acute distress    Eyes: No scleral icterus, no proptosis "     ENT -no obvious auditory deficits, wearing a face mask    Skin -no rashes or lesions noted     Respiratory-  breathing comfortable on room air, no audible wheezing    Cardiovascular- No lower extremity edema is noted.     Psychiatric- alert and oriented ×3. Normal affect.     Gait - normal gait, no use of ambulatory device, nonantalgic.     Musculoskeletal -   Cervical spine  Decreased ROM in flexion, extension, left and right rotation    Tenderness to palpation over the upper cervical pillars bilaterally    Spurling's test causes some pain in the neck without radicular symptoms.      Thoracic/Lumbar Spine/Sacral Spine/Hips   Inspection: No evidence of atrophy in bilateral lower extremities throughout     ROM: mildly decreased AROM with flexion, extension, lateral flexion bilaterally, with pain in extension.      Palpation:   No tenderness to palpation in midline at T1-T12 levels. No tenderness to palpation in the left and right of the midline T1-L5, POSITIVE for tenderness in the trapezius and upper thoracic paraspinals.    Lumbar spine Special tests  Neuro tension  Seated straight leg test negative bilaterally    Neuro     Key points for the international standards for neurological classification of spinal cord injury (ISNCSCI) to light touch.       Dermatome R L   C4 2 2   C5 2 2   C6 2 2   C7 2 2   T1 2 2   T2 2 2     Dermatome R L   L2 2 2   L3 2 2   L4 2 2   L5 2 2   S1 2 2   S2 2 2         Motor Exam Lower Extremities    ? Myotome R L   Shoulder abduction C5 5 5   Elbow flexion C6 5 5   Elbow extension C7 5 5   Finger flexion C8 5 5   Finger abduction T1 5 5     ? Myotome R L   Hip flexion L2 5 5   Knee extension L3 5 5   Ankle dorsiflexion L4 5 5   Toe extension L5 4+ 5   Ankle plantarflexion S1 5 5     Cabral's is positive bilaterally    Reflexes  ?  R L   Biceps  2+ 2+   Triceps  2+ 2+   Patella  2+ 2+   Achilles   1+ 2+       MEDICAL DECISION MAKING    Medical records review: see under HPI section.      DATA    Labs:   Lab Results   Component Value Date/Time    SODIUM 140 09/18/2019 01:14 PM    POTASSIUM 3.5 (L) 09/18/2019 01:14 PM    CHLORIDE 105 09/18/2019 01:14 PM    CO2 27 09/18/2019 01:14 PM    ANION 8.0 09/18/2019 01:14 PM    GLUCOSE 107 (H) 09/18/2019 01:14 PM    BUN 13 09/18/2019 01:14 PM    CREATININE 1.16 09/18/2019 01:14 PM    CALCIUM 9.4 09/18/2019 01:14 PM    ASTSGOT 17 09/06/2018 01:52 PM    ALTSGPT 18 09/06/2018 01:52 PM    TBILIRUBIN 0.4 09/06/2018 01:52 PM    ALBUMIN 3.9 02/01/2019 02:26 PM    ALBUMIN 3.28 (L) 08/18/2014 12:04 PM    TOTPROTEIN 6.5 09/06/2018 01:52 PM    TOTPROTEIN 7.20 08/18/2014 12:04 PM    GLOBULIN 2.7 09/06/2018 01:52 PM    AGRATIO 1.4 09/06/2018 01:52 PM       Lab Results   Component Value Date/Time    PROTHROMBTM 14.3 05/09/2014 08:30 AM    INR 1.12 05/09/2014 08:30 AM        Lab Results   Component Value Date/Time    WBC 8.7 10/25/2018 11:18 AM    RBC 5.14 10/25/2018 11:18 AM    HEMOGLOBIN 14.8 10/25/2018 11:18 AM    HEMATOCRIT 45.9 10/25/2018 11:18 AM    MCV 89.3 10/25/2018 11:18 AM    MCH 28.8 10/25/2018 11:18 AM    MCHC 32.2 (L) 10/25/2018 11:18 AM    MPV 9.5 10/25/2018 11:18 AM    NEUTSPOLYS 72.80 (H) 10/25/2018 11:18 AM    LYMPHOCYTES 20.00 (L) 10/25/2018 11:18 AM    MONOCYTES 5.60 10/25/2018 11:18 AM    EOSINOPHILS 1.00 10/25/2018 11:18 AM    EOSINOPHILS 2 06/04/2014 11:45 AM    BASOPHILS 0.30 10/25/2018 11:18 AM    HYPOCHROMIA 1+ 08/11/2014 02:56 PM    ANISOCYTOSIS 1+ 05/12/2014 12:40 PM        Lab Results   Component Value Date/Time    HBA1C 6.7 (H) 09/06/2018 01:52 PM        Imaging: I personally reviewed following images, these are my reads  MRI thoracic spine 09/17/2020  There is note of a right paracentral disc protrusion at T11-12.  This does efface the spinal cord.  No central or foraminal stenosis.  Otherwise, there is note of a small disc protrusion at T7-8 without central or foraminal stenosis.  No other significant abnormalities in the remainder of the  thoracic spine.    MRI lumbar spine 09/17/2020  There is mild decreased disc height at L5-S1 with mild bilateral foraminal stenosis.  No central canal stenosis.  No central or foraminal canal stenosis from L1-2 through L4-5.      CT abdomen/pelvis with contrast 09/03/2014 with focus on lumbar spine  There is degenerative disc disease greatest at L5-S1    IMAGING radiology reads. I reviewed the following radiology reads     MRI thoracic spine 09/17/2020  IMPRESSION:  1.  Right paracentral disc protrusion at T11-12 which effaces the CSF space anterior to the spinal cord. No central canal or neural foraminal narrowing.  2.  Annular disc bulge with a small central disc protrusion at T7-8. No central canal or neural foraminal narrowing.  3.  No evidence of thoracic spine fracture.      MRI lumbar spine 09/03/2020  IMPRESSION:     1.  T11/12 right paracentral extrusion indents the ventral right aspect of the cord but no cord signal abnormality is detected     2.  Otherwise minimal degenerative disc disease with at most borderline left L5/S1 foraminal stenosis     3.  Abdominal wall hernia    CT abdomen/pelvis with contrast 09/03/2014     Impression       1.  Left lower lobe atelectasis/possible pneumonia and small left pleural effusion.   2.  No pulmonary nodules within the right lung base identified. The right lung base is incompletely imaged as compared to the previous examinations.   3.  Postoperative changes consistent with colectomy and right lower quadrant ileostomy. Rectal remnant demonstrated. Previously demonstrated presacral fluid collection not identified.   4.  Small/moderate increased fluid within the abdomen and pelvis.   5.  Since previous examination, 5.3 x 6.4 cm multiseptated cystic structure/loculated fluid collection is located anterior to the uterus and posterior to the urinary bladder. Differential considerations include adnexal/ovarian cystic structure. Seroma pseudocyst, or abscess are not  excluded.                                                                                              Diagnosis   Visit Diagnoses     ICD-10-CM   1. Cervicalgia  M54.2   2. Cabral's reflex positive  R29.2   3. Muscle spasm  M62.838   4. DDD (degenerative disc disease), lumbar  M51.36   5. Thoracic disc herniation  M51.24           ASSESSMENT:  Gala Camarena 40 y.o. female seen for above     Gala was seen today for follow-up.    Diagnoses and all orders for this visit:    Cervicalgia  -     DX-CERVICAL SPINE-2 OR 3 VIEWS; Future  -     MR-CERVICAL SPINE-W/O; Future  -     DULoxetine (CYMBALTA) 30 MG Cap DR Particles; Take 1 Cap by mouth every day for 7 days.  -     DULoxetine (CYMBALTA) 60 MG Cap DR Particles delayed-release capsule; Take 1 Cap by mouth every day for 30 days.    Cabral's reflex positive  -     MR-CERVICAL SPINE-W/O; Future    Muscle spasm  -     methocarbamol (ROBAXIN) 500 MG Tab; Take 2 Tabs by mouth 2 times a day as needed for up to 30 days.    DDD (degenerative disc disease), lumbar  -     DULoxetine (CYMBALTA) 30 MG Cap DR Particles; Take 1 Cap by mouth every day for 7 days.  -     DULoxetine (CYMBALTA) 60 MG Cap DR Particles delayed-release capsule; Take 1 Cap by mouth every day for 30 days.    Thoracic disc herniation          1. Discussed that her PT is on hold currently  2. Advised that she limit lifting at work, she reports that she is able to do this.  3. Xray and MRI cervical spine ordered.  Significant pain with positive Cabral's sign.  4. Trial of duloxetine 30mg for 7 days and then increase to 60mg daily.  5. Thoracic and lower back pain is improved after medrol dose darling.  Continue to follow-up.      Follow-up: Return for after MRI cervical spine.      Please note that this dictation was created using voice recognition software. I have made every reasonable attempt to correct obvious errors but there may be errors of grammar and content that I may have overlooked prior to  finalization of this note.      Joshua Bauman MD  Physical Medicine and Rehabilitation  Interventional Spine and Sports Physiatry  [facility]    CC: Dr. Granado

## 2020-11-20 ENCOUNTER — APPOINTMENT (OUTPATIENT)
Dept: PHYSICAL THERAPY | Facility: REHABILITATION | Age: 40
End: 2020-11-20
Attending: NURSE PRACTITIONER

## 2020-11-24 ENCOUNTER — APPOINTMENT (OUTPATIENT)
Dept: PHYSICAL MEDICINE AND REHAB | Facility: MEDICAL CENTER | Age: 40
End: 2020-11-24
Payer: COMMERCIAL

## 2020-12-04 ENCOUNTER — APPOINTMENT (OUTPATIENT)
Dept: PHYSICAL THERAPY | Facility: REHABILITATION | Age: 40
End: 2020-12-04
Attending: NURSE PRACTITIONER
Payer: COMMERCIAL

## 2021-03-13 DIAGNOSIS — E89.0 HYPOTHYROIDISM, POSTSURGICAL: ICD-10-CM

## 2021-03-13 DIAGNOSIS — E27.40 ADRENAL INSUFFICIENCY (HCC): ICD-10-CM

## 2021-03-13 DIAGNOSIS — E11.9 TYPE 2 DIABETES MELLITUS WITHOUT COMPLICATION, WITHOUT LONG-TERM CURRENT USE OF INSULIN (HCC): ICD-10-CM

## 2021-03-13 RX ORDER — LEVOTHYROXINE SODIUM 137 UG/1
137 TABLET ORAL
Qty: 30 TABLET | Refills: 5 | Status: SHIPPED | OUTPATIENT
Start: 2021-03-13 | End: 2022-03-25 | Stop reason: SDUPTHER

## 2021-04-05 ENCOUNTER — HOSPITAL ENCOUNTER (OUTPATIENT)
Dept: LAB | Facility: MEDICAL CENTER | Age: 41
End: 2021-04-05
Attending: INTERNAL MEDICINE
Payer: COMMERCIAL

## 2021-04-05 DIAGNOSIS — E27.40 ADRENAL INSUFFICIENCY (HCC): ICD-10-CM

## 2021-04-05 DIAGNOSIS — E89.0 HYPOTHYROIDISM, POSTSURGICAL: ICD-10-CM

## 2021-04-05 DIAGNOSIS — E11.9 TYPE 2 DIABETES MELLITUS WITHOUT COMPLICATION, WITHOUT LONG-TERM CURRENT USE OF INSULIN (HCC): ICD-10-CM

## 2021-04-05 LAB
ALBUMIN SERPL BCP-MCNC: 4.5 G/DL (ref 3.2–4.9)
ALBUMIN/GLOB SERPL: 1.6 G/DL
ALP SERPL-CCNC: 67 U/L (ref 30–99)
ALT SERPL-CCNC: 14 U/L (ref 2–50)
ANION GAP SERPL CALC-SCNC: 10 MMOL/L (ref 7–16)
AST SERPL-CCNC: 14 U/L (ref 12–45)
BASOPHILS # BLD AUTO: 0.5 % (ref 0–1.8)
BASOPHILS # BLD: 0.04 K/UL (ref 0–0.12)
BILIRUB SERPL-MCNC: 0.2 MG/DL (ref 0.1–1.5)
BUN SERPL-MCNC: 19 MG/DL (ref 8–22)
CALCIUM SERPL-MCNC: 9.3 MG/DL (ref 8.5–10.5)
CHLORIDE SERPL-SCNC: 106 MMOL/L (ref 96–112)
CO2 SERPL-SCNC: 22 MMOL/L (ref 20–33)
CORTIS SERPL-MCNC: 11.7 UG/DL (ref 0–23)
CREAT SERPL-MCNC: 0.9 MG/DL (ref 0.5–1.4)
EOSINOPHIL # BLD AUTO: 0.2 K/UL (ref 0–0.51)
EOSINOPHIL NFR BLD: 2.5 % (ref 0–6.9)
ERYTHROCYTE [DISTWIDTH] IN BLOOD BY AUTOMATED COUNT: 48.6 FL (ref 35.9–50)
EST. AVERAGE GLUCOSE BLD GHB EST-MCNC: 137 MG/DL
FASTING STATUS PATIENT QL REPORTED: NORMAL
GLOBULIN SER CALC-MCNC: 2.9 G/DL (ref 1.9–3.5)
GLUCOSE SERPL-MCNC: 121 MG/DL (ref 65–99)
HBA1C MFR BLD: 6.4 % (ref 4–5.6)
HCT VFR BLD AUTO: 43.6 % (ref 37–47)
HGB BLD-MCNC: 14 G/DL (ref 12–16)
IMM GRANULOCYTES # BLD AUTO: 0.05 K/UL (ref 0–0.11)
IMM GRANULOCYTES NFR BLD AUTO: 0.6 % (ref 0–0.9)
LYMPHOCYTES # BLD AUTO: 1.31 K/UL (ref 1–4.8)
LYMPHOCYTES NFR BLD: 16.2 % (ref 22–41)
MCH RBC QN AUTO: 30.8 PG (ref 27–33)
MCHC RBC AUTO-ENTMCNC: 32.1 G/DL (ref 33.6–35)
MCV RBC AUTO: 95.8 FL (ref 81.4–97.8)
MONOCYTES # BLD AUTO: 0.44 K/UL (ref 0–0.85)
MONOCYTES NFR BLD AUTO: 5.5 % (ref 0–13.4)
NEUTROPHILS # BLD AUTO: 6.03 K/UL (ref 2–7.15)
NEUTROPHILS NFR BLD: 74.7 % (ref 44–72)
NRBC # BLD AUTO: 0 K/UL
NRBC BLD-RTO: 0 /100 WBC
PLATELET # BLD AUTO: 243 K/UL (ref 164–446)
PMV BLD AUTO: 9.6 FL (ref 9–12.9)
POTASSIUM SERPL-SCNC: 5 MMOL/L (ref 3.6–5.5)
PROT SERPL-MCNC: 7.4 G/DL (ref 6–8.2)
RBC # BLD AUTO: 4.55 M/UL (ref 4.2–5.4)
SODIUM SERPL-SCNC: 138 MMOL/L (ref 135–145)
T4 FREE SERPL-MCNC: 1.42 NG/DL (ref 0.93–1.7)
TSH SERPL DL<=0.005 MIU/L-ACNC: 8.32 UIU/ML (ref 0.38–5.33)
WBC # BLD AUTO: 8.1 K/UL (ref 4.8–10.8)

## 2021-04-05 PROCEDURE — 84443 ASSAY THYROID STIM HORMONE: CPT

## 2021-04-05 PROCEDURE — 85025 COMPLETE CBC W/AUTO DIFF WBC: CPT

## 2021-04-05 PROCEDURE — 82533 TOTAL CORTISOL: CPT

## 2021-04-05 PROCEDURE — 84439 ASSAY OF FREE THYROXINE: CPT

## 2021-04-05 PROCEDURE — 36415 COLL VENOUS BLD VENIPUNCTURE: CPT

## 2021-04-05 PROCEDURE — 80053 COMPREHEN METABOLIC PANEL: CPT

## 2021-04-05 PROCEDURE — 82024 ASSAY OF ACTH: CPT

## 2021-04-05 PROCEDURE — 83036 HEMOGLOBIN GLYCOSYLATED A1C: CPT

## 2021-04-06 LAB — ACTH PLAS-MCNC: 12.8 PG/ML (ref 7.2–63.3)

## 2021-04-13 ENCOUNTER — OFFICE VISIT (OUTPATIENT)
Dept: ENDOCRINOLOGY | Facility: MEDICAL CENTER | Age: 41
End: 2021-04-13
Attending: INTERNAL MEDICINE
Payer: COMMERCIAL

## 2021-04-13 VITALS
OXYGEN SATURATION: 96 % | HEIGHT: 63 IN | RESPIRATION RATE: 14 BRPM | WEIGHT: 154 LBS | HEART RATE: 86 BPM | SYSTOLIC BLOOD PRESSURE: 110 MMHG | BODY MASS INDEX: 27.29 KG/M2 | DIASTOLIC BLOOD PRESSURE: 80 MMHG

## 2021-04-13 DIAGNOSIS — E89.0 HYPOTHYROIDISM, POSTSURGICAL: ICD-10-CM

## 2021-04-13 DIAGNOSIS — E27.40 ADRENAL INSUFFICIENCY (HCC): ICD-10-CM

## 2021-04-13 PROCEDURE — 99211 OFF/OP EST MAY X REQ PHY/QHP: CPT | Performed by: INTERNAL MEDICINE

## 2021-04-13 PROCEDURE — 99214 OFFICE O/P EST MOD 30 MIN: CPT | Performed by: INTERNAL MEDICINE

## 2021-04-13 RX ORDER — LEVOTHYROXINE SODIUM 137 UG/1
137 TABLET ORAL
Qty: 30 TABLET | Refills: 5 | Status: SHIPPED | OUTPATIENT
Start: 2021-04-13 | End: 2021-09-13

## 2021-04-13 RX ORDER — M-VIT,TX,IRON,MINS/CALC/FOLIC 27MG-0.4MG
1 TABLET ORAL DAILY
COMMUNITY

## 2021-04-13 RX ORDER — HYDROCORTISONE 10 MG/1
10 TABLET ORAL DAILY
Qty: 30 TABLET | Refills: 3 | Status: SHIPPED | OUTPATIENT
Start: 2021-04-13 | End: 2021-08-16

## 2021-04-13 ASSESSMENT — FIBROSIS 4 INDEX: FIB4 SCORE: 0.63

## 2021-04-13 NOTE — PROGRESS NOTES
"Chief Complaint   Patient presents with   • Hypothyroidism   • Adrenal Insufficiency        HPI:    The Covid 19 restrictions have thrown us off schedule.  Fortunately she has not been ill and working steadily.  Feeling generally well but not able to exercise as much as she would like because of the confinement.     My plan was to gradually wean her off of hydrocortisone but that has not happened.  She is still taking 10 mg p.o. every morning.  Her current cortisol before her morning dose is 11.7 with an ACTH of 12.8.  I think we can wean her off.  She will reduce the dose now to 5 mg a.m. and in 1 week repeat the cortisol and ACTH.  If that is satisfactory we will go off of hydrocortisone and do a stim test.         Clinically euthyroid taking levothyroxine 137 mcg daily.  Her current thyroid blood have changed.  She was out of thyroid for about 2 weeks last month and has been back on the full dose for 1 month.  Her blood levels 1 week ago or after 3 weeks back on thyroid indicated TSH of 8.3 and a free T4 of 1.4.  I do not think that is a accurate indicator of this particular dose.  I will  repeat her levels in another week and see if they are different still.    ROS:  Hemoglobin A1c 6.4.  She understands the meaning of this.  She is trying to exercise and diet to lose 10 pounds which should be as good as medication.    All other systems reported as negative or unchanged since last exam      Allergies:   Allergies   Allergen Reactions   • Ativan      Hallucinations, restless   • Compazine Palpitations     Hypertension   • Prochlorperazine Palpitations     \"Compazine\"; severe HTN   • Tape Rash     Paper tape and tegaderm is okay.       Current medicines including changes today:  Current Outpatient Medications   Medication Sig Dispense Refill   • therapeutic multivitamin-minerals (THERAGRAN-M) Tab Take 1 tablet by mouth every day.     • levothyroxine (SYNTHROID) 137 MCG Tab Take 1 tablet by mouth every morning on an " empty stomach. 30 tablet 5   • hydrocortisone (CORTEF) 10 MG Tab Take 1 tablet by mouth every day. 30 tablet 3   • levothyroxine (SYNTHROID) 137 MCG Tab Take 1 tablet by mouth Every morning on an empty stomach. 30 tablet 5   • hydrocortisone (CORTEF) 10 MG Tab TAKE ONE TABLET BY MOUTH TWICE A DAY 60 Tab 4   • SUMAtriptan (IMITREX) 50 MG Tab TAKE ONE TABLET BY MOUTH ONE TIME AS NEEDED FOR MIGRAINE; MAY REPEAT ONE TABLET IN 2 HOURS IF HEADACHE PERSISTS. MAX OF 2 TABLETS A DAY 9 Tab 11   • Calcium Carb-Cholecalciferol (OYSTER SHELL CALCIUM/VITAMIN D) 250-125 MG-UNIT Tab tablet Take 2 Tabs by mouth 3 times a day. 60 Tab 0   • Ibuprofen (ADVIL) 200 MG CAPS Take 4 Caps by mouth as needed. Indications: Mild to Moderate Pain     • methylPREDNISolone (MEDROL DOSEPAK) 4 MG Tablet Therapy Pack As directed on the packaging label. (Patient not taking: Reported on 4/13/2021) 21 Tab 0   • omeprazole (PRILOSEC) 40 MG delayed-release capsule Take 40 mg by mouth every day.       No current facility-administered medications for this visit.        Past Medical History:   Diagnosis Date   • Adrenal insufficiency (HCC) 2014    21-hydroxylase antibody = neg   • Anxiety     in remission   • Blood transfusion, without reported diagnosis 8/2013   • Chronic erosive gastritis 8/14    Dr Mercedes   • Depression     in remission   • GERD (gastroesophageal reflux disease)     omeprazole   • Headache, classical migraine 11/2014    since 8yoa, triggers=unk, imitrex works well.  freq=3-4x/month.     • Heart burn    • Hypercalcemia 2014   • Hypothyroidism, postsurgical    • IgA gammopathy 2014   • Indigestion    • Kidney disease     hx of pyelo, outpat tx   • Mixed anxiety and depressive disorder 2014   • Multiple thyroid nodules 7/31/2017   • Opioid dependence (HCC) 2014    hx of heavy use of dilaudid   • Ovarian cyst    • Pain 05-08-15    abd., rectum, 3/10   • Pancreatitis 2014   • S/P total colectomy 2014   • Sciatica 8/26/2019   • Thyrotoxicosis  "2015    hyper, on methimazole, sees endo   • Type II or unspecified type diabetes mellitus without mention of complication, not stated as uncontrolled 2/2014    NIDDM, sees endo.  no GDM.  diet controlled    • Ulcerative colitis, chronic (HCC)        PHYSICAL EXAM:    /80 (BP Location: Left arm, Patient Position: Sitting, BP Cuff Size: Adult)   Pulse 86   Resp 14   Ht 1.6 m (5' 3\")   Wt 69.9 kg (154 lb)   SpO2 96%   BMI 27.28 kg/m²     Gen.   appears healthy    Skin   appropriate for sex and age    HEENT  unremarkable    Neck   no palpable thyroid    Lungs  clear    Heart  regular    Extremities  no edema    Neuro  gait and station normal    Psych  appropriate    ASSESSMENT AND RECOMMENDATIONS    1. Hypothyroidism, postsurgical                  See HPI.                  Repeat thyroid levels next week    2. Adrenal insufficiency (HCC)           Weaning off of hydrocortisone.  Currently on 10 mg/day.            Reduce the dose to 5 mg/day and repeat ACTH and cortisol       DISPOSITION: Follow-up next lab by MyChart or telephone.                             Discontinue hydrocortisone if indicated.  Follow-up ACTH stim test                             Return visit in 3 months.      Sebastian Soler M.D.    Copies to: Pcp Pt States None None  "

## 2021-05-19 ENCOUNTER — TELEPHONE (OUTPATIENT)
Dept: PHYSICAL THERAPY | Facility: REHABILITATION | Age: 41
End: 2021-05-19

## 2021-05-19 NOTE — OP THERAPY DISCHARGE SUMMARY
Outpatient Physical Therapy  DISCHARGE SUMMARY NOTE      [facility]  [address], [address]  [place] [facility] [place] [address]  Phone:  [phone]  Fax:  [phone]    Date of Visit: 05/19/2021    Patient: Gala Camarena  YOB: 1980  MRN: [id number]     Referring Provider: Joshua Bauman M.D.   Referring Diagnosis DDD (degenerative disc disease), lumbar [M51.36];Low back pain with radiation [M54.5];Foraminal stenosis of lumbar region [M48.061];Thoracic disc herniation [M51.24]           Your patient is being discharged from Physical Therapy with the following comments:   · Progress plateau  · Patient has failed to schedule or reschedule follow-up visits    Comments:  Gala Camarena has been discharged due to a lapse in care greater than 30 days. Thank you for the opportunity to assist you and your patient.     Limitations Remaining:  Unknown    Recommendations:  Admin discharge    Dylan Hale PT, DPT    Date: 5/19/2021

## 2021-07-12 ENCOUNTER — APPOINTMENT (OUTPATIENT)
Dept: ENDOCRINOLOGY | Facility: MEDICAL CENTER | Age: 41
End: 2021-07-12
Attending: INTERNAL MEDICINE
Payer: COMMERCIAL

## 2021-08-14 DIAGNOSIS — E27.40 ADRENAL INSUFFICIENCY (HCC): ICD-10-CM

## 2021-08-16 ENCOUNTER — TELEPHONE (OUTPATIENT)
Dept: ENDOCRINOLOGY | Facility: MEDICAL CENTER | Age: 41
End: 2021-08-16

## 2021-08-16 DIAGNOSIS — E27.40 ADRENAL INSUFFICIENCY (HCC): ICD-10-CM

## 2021-08-16 RX ORDER — HYDROCORTISONE 10 MG/1
10 TABLET ORAL DAILY
Qty: 30 TABLET | Refills: 3 | Status: SHIPPED | OUTPATIENT
Start: 2021-08-16 | End: 2022-01-17

## 2021-08-16 NOTE — TELEPHONE ENCOUNTER
Received request via: Pharmacy    Was the patient seen in the last year in this department? Yes    Does the patient have an active prescription (recently filled or refills available) for medication(s) requested? No     REQUESTED MEDICATION:   Requested Prescriptions     Pending Prescriptions Disp Refills   • hydrocortisone (CORTEF) 10 MG Tab [Pharmacy Med Name: HYDROCORTISONE 10 MG TABLET] 30 Tablet 3     Sig: TAKE 1 TABLET BY MOUTH EVERY DAY

## 2021-08-16 NOTE — TELEPHONE ENCOUNTER
Telephone conversation with patient    We have been interrupted by the confusion about whether or not her [employer] insurance is excepted by [facility] or not.  She has not been able to do any follow-up.  Fortunately she is feeling well.  She is taking hydrocortisone she thinks 5 mg/day and doing okay so she can probably go off of it but we do need to monitor her lab.    I will renew her hydrocortisone prescription so she does not run out.  She will confirm with her insurance whether or not she is covered or not.  If not she is going to get a new PCP and I can discuss her case with her new PCP.  If we wean her off of hydrocortisone she does not need an endocrinologist.    Sebastian Soler M.D.

## 2021-09-12 DIAGNOSIS — E89.0 HYPOTHYROIDISM, POSTSURGICAL: ICD-10-CM

## 2021-09-13 RX ORDER — LEVOTHYROXINE SODIUM 137 UG/1
TABLET ORAL
Qty: 90 TABLET | Refills: 1 | Status: SHIPPED | OUTPATIENT
Start: 2021-09-13 | End: 2022-03-16

## 2021-09-13 NOTE — TELEPHONE ENCOUNTER
Received request via: Pharmacy    Was the patient seen in the last year in this department? Yes    Does the patient have an active prescription (recently filled or refills available) for medication(s) requested? No     REQUESTED MEDICATION:   Requested Prescriptions     Pending Prescriptions Disp Refills   • levothyroxine (SYNTHROID) 137 MCG Tab [Pharmacy Med Name: LEVOTHYROXINE 137 MCG TABLET] 90 Tablet 1     Sig: TAKE 1 TABLET BY MOUTH EVERY DAY IN THE MORNING ON AN EMPTY STOMACH

## 2021-11-03 ENCOUNTER — PATIENT MESSAGE (OUTPATIENT)
Dept: ENDOCRINOLOGY | Facility: MEDICAL CENTER | Age: 41
End: 2021-11-03

## 2021-11-03 ENCOUNTER — TELEPHONE (OUTPATIENT)
Dept: ENDOCRINOLOGY | Facility: MEDICAL CENTER | Age: 41
End: 2021-11-03

## 2021-11-03 DIAGNOSIS — E11.9 TYPE 2 DIABETES MELLITUS WITHOUT COMPLICATION, WITHOUT LONG-TERM CURRENT USE OF INSULIN (HCC): ICD-10-CM

## 2021-11-03 DIAGNOSIS — E27.40 ADRENAL INSUFFICIENCY (HCC): ICD-10-CM

## 2021-11-03 DIAGNOSIS — E89.0 HYPOTHYROIDISM, POSTSURGICAL: ICD-10-CM

## 2021-11-03 NOTE — TELEPHONE ENCOUNTER
PT called to set up f/v for 12/15. She would like labs to be ordered as cortisol and acth are the only ones in there

## 2021-11-03 NOTE — TELEPHONE ENCOUNTER
From: Gala Camarena  To: Physician Sebastian Soler  Sent: 11/3/2021 10:32 AM PDT  Subject: Would like an appointment    Good Morning Dr. Soler,   It appears my medical insurance is working with [facility] again. I would like to see if I could schedule an appointment with you. I have a few concerns. I haven't been feeling very well and I would like to talk to you about it. Please let me know the best way to make an appointment.     Thank you,  Gala

## 2021-11-04 NOTE — PROGRESS NOTES
Patient called asking for lab orders.  She does have an appointment and apparently were okay with her insurance coverage.    Generally feeling well and being very active but perhaps not as much stamina and energy is she should have.  Hydrocortisone is down to 5 mg in the morning.    I will update lab and review in office.    Sebastian Soler M.D.

## 2021-11-20 ENCOUNTER — HOSPITAL ENCOUNTER (OUTPATIENT)
Dept: LAB | Facility: MEDICAL CENTER | Age: 41
End: 2021-11-20
Attending: INTERNAL MEDICINE
Payer: COMMERCIAL

## 2021-11-20 DIAGNOSIS — E89.0 HYPOTHYROIDISM, POSTSURGICAL: ICD-10-CM

## 2021-11-20 DIAGNOSIS — E11.9 TYPE 2 DIABETES MELLITUS WITHOUT COMPLICATION, WITHOUT LONG-TERM CURRENT USE OF INSULIN (HCC): ICD-10-CM

## 2021-11-20 DIAGNOSIS — E27.40 ADRENAL INSUFFICIENCY (HCC): ICD-10-CM

## 2021-11-20 LAB
ALBUMIN SERPL BCP-MCNC: 4.8 G/DL (ref 3.2–4.9)
ALBUMIN/GLOB SERPL: 1.8 G/DL
ALP SERPL-CCNC: 61 U/L (ref 30–99)
ALT SERPL-CCNC: 8 U/L (ref 2–50)
ANION GAP SERPL CALC-SCNC: 8 MMOL/L (ref 7–16)
AST SERPL-CCNC: 11 U/L (ref 12–45)
BILIRUB SERPL-MCNC: 0.3 MG/DL (ref 0.1–1.5)
BUN SERPL-MCNC: 15 MG/DL (ref 8–22)
CALCIUM SERPL-MCNC: 9.8 MG/DL (ref 8.5–10.5)
CHLORIDE SERPL-SCNC: 106 MMOL/L (ref 96–112)
CO2 SERPL-SCNC: 24 MMOL/L (ref 20–33)
CORTIS SERPL-MCNC: 14.8 UG/DL (ref 0–23)
CREAT SERPL-MCNC: 0.93 MG/DL (ref 0.5–1.4)
EST. AVERAGE GLUCOSE BLD GHB EST-MCNC: 143 MG/DL
FASTING STATUS PATIENT QL REPORTED: NORMAL
GLOBULIN SER CALC-MCNC: 2.6 G/DL (ref 1.9–3.5)
GLUCOSE SERPL-MCNC: 118 MG/DL (ref 65–99)
HBA1C MFR BLD: 6.6 % (ref 4–5.6)
POTASSIUM SERPL-SCNC: 4.6 MMOL/L (ref 3.6–5.5)
PROT SERPL-MCNC: 7.4 G/DL (ref 6–8.2)
SODIUM SERPL-SCNC: 138 MMOL/L (ref 135–145)
T4 FREE SERPL-MCNC: 1.53 NG/DL (ref 0.93–1.7)
TSH SERPL DL<=0.005 MIU/L-ACNC: 2.95 UIU/ML (ref 0.38–5.33)

## 2021-11-20 PROCEDURE — 82024 ASSAY OF ACTH: CPT

## 2021-11-20 PROCEDURE — 82533 TOTAL CORTISOL: CPT

## 2021-11-20 PROCEDURE — 84443 ASSAY THYROID STIM HORMONE: CPT

## 2021-11-20 PROCEDURE — 80053 COMPREHEN METABOLIC PANEL: CPT

## 2021-11-20 PROCEDURE — 36415 COLL VENOUS BLD VENIPUNCTURE: CPT

## 2021-11-20 PROCEDURE — 84439 ASSAY OF FREE THYROXINE: CPT

## 2021-11-20 PROCEDURE — 83036 HEMOGLOBIN GLYCOSYLATED A1C: CPT

## 2021-11-22 LAB — ACTH PLAS-MCNC: 22.9 PG/ML (ref 7.2–63.3)

## 2021-12-15 ENCOUNTER — OFFICE VISIT (OUTPATIENT)
Dept: ENDOCRINOLOGY | Facility: MEDICAL CENTER | Age: 41
End: 2021-12-15
Attending: INTERNAL MEDICINE
Payer: COMMERCIAL

## 2021-12-15 VITALS
SYSTOLIC BLOOD PRESSURE: 120 MMHG | BODY MASS INDEX: 28.88 KG/M2 | HEART RATE: 93 BPM | OXYGEN SATURATION: 100 % | HEIGHT: 63 IN | DIASTOLIC BLOOD PRESSURE: 72 MMHG | WEIGHT: 163 LBS

## 2021-12-15 DIAGNOSIS — E11.9 TYPE 2 DIABETES MELLITUS WITHOUT COMPLICATION, WITHOUT LONG-TERM CURRENT USE OF INSULIN (HCC): ICD-10-CM

## 2021-12-15 DIAGNOSIS — E27.40 ADRENAL INSUFFICIENCY (HCC): ICD-10-CM

## 2021-12-15 PROBLEM — E04.2 MULTIPLE THYROID NODULES: Status: RESOLVED | Noted: 2017-07-31 | Resolved: 2021-12-15

## 2021-12-15 PROCEDURE — 99214 OFFICE O/P EST MOD 30 MIN: CPT | Performed by: INTERNAL MEDICINE

## 2021-12-15 PROCEDURE — 99211 OFF/OP EST MAY X REQ PHY/QHP: CPT | Performed by: INTERNAL MEDICINE

## 2021-12-15 ASSESSMENT — FIBROSIS 4 INDEX: FIB4 SCORE: 0.67

## 2021-12-15 NOTE — PROGRESS NOTES
Chief Complaint   Patient presents with   • Adrenal Insufficiency   • Diabetes Mellitus        HPI:    Adrenal insufficiency, 2014           This was a very confusing time in her life with multiple illnesses and the discovery of low cortisol levels which led to a presumptive diagnosis of adrenal insufficiency.  At the time her main issue was uncontrolled ulcerative colitis with significant weight loss and a superimposed episode of hypercalcemia and acute pancreatitis.  During one of her admissions a random cortisol 3.0 was found and another  of 1.9.  No ACTH was done.  It was difficult to know if she had received any steroids prior to those determinations.  She was placed on hydrocortisone and did very well so there was never a good time in the beginning to take her off of cortisone and reassess.  She has never had a very high ACTH.  She does not have 21-hydroxylase antibodies.         More recently we have tried to wean her off of hydrocortisone and this is working out quite well. She has gradually reduced her dose down to just 5 mg of hydrocortisone per day.  Her recent morning cortisol level is 14.8 with an ACTH of 22.9.  She will now go off of hydrocortisone and in 1 week repeat these levels.  She currently has no symptoms suggesting adrenal insufficiency.    She also has diet-controlled diabetes mellitus.  She is struggling to control her weight.  Because of some back disc problems she is not able to exercise vigorously as she had in the past.  Her current a.m. FBS is 118 and A1c is 6.6.  When we settle the adrenal issue I think we can help her with the diabetes and weight control.    ROS:  She is experiencing intermittent throat irritation without obvious etiology.  This is not pain or tenderness in the neck or thyroid bed post thyroidectomy.  My examination of her neck and throat is negative.      Allergies:   Allergies   Allergen Reactions   • Ativan      Hallucinations, restless   • Compazine Palpitations  "    Hypertension   • Prochlorperazine Palpitations     \"Compazine\"; severe HTN   • Tape Rash     Paper tape and tegaderm is okay.       Current medicines including changes today:  Current Outpatient Medications   Medication Sig Dispense Refill   • levothyroxine (SYNTHROID) 137 MCG Tab TAKE 1 TABLET BY MOUTH EVERY DAY IN THE MORNING ON AN EMPTY STOMACH 90 Tablet 1   • hydrocortisone (CORTEF) 10 MG Tab TAKE 1 TABLET BY MOUTH EVERY DAY 30 Tablet 3   • therapeutic multivitamin-minerals (THERAGRAN-M) Tab Take 1 tablet by mouth every day.     • levothyroxine (SYNTHROID) 137 MCG Tab Take 1 tablet by mouth Every morning on an empty stomach. 30 tablet 5   • hydrocortisone (CORTEF) 10 MG Tab TAKE ONE TABLET BY MOUTH TWICE A DAY 60 Tab 4   • SUMAtriptan (IMITREX) 50 MG Tab TAKE ONE TABLET BY MOUTH ONE TIME AS NEEDED FOR MIGRAINE; MAY REPEAT ONE TABLET IN 2 HOURS IF HEADACHE PERSISTS. MAX OF 2 TABLETS A DAY 9 Tab 11   • Calcium Carb-Cholecalciferol (OYSTER SHELL CALCIUM/VITAMIN D) 250-125 MG-UNIT Tab tablet Take 2 Tabs by mouth 3 times a day. 60 Tab 0   • Ibuprofen (ADVIL) 200 MG CAPS Take 4 Caps by mouth as needed. Indications: Mild to Moderate Pain       No current facility-administered medications for this visit.        Past Medical History:   Diagnosis Date   • Adrenal insufficiency (HCC) 2014    21-hydroxylase antibody = neg   • Anxiety     in remission   • Blood transfusion, without reported diagnosis 8/2013   • Chronic erosive gastritis 8/14    Dr Mercedes   • Depression     in remission   • GERD (gastroesophageal reflux disease)     omeprazole   • Headache, classical migraine 11/2014    since 8yoa, triggers=unk, imitrex works well.  freq=3-4x/month.     • Heart burn    • Hypercalcemia 2014   • Hypothyroidism, postsurgical    • IgA gammopathy 2014   • Indigestion    • Kidney disease     hx of pyelo, outpat tx   • Mixed anxiety and depressive disorder 2014   • Multiple thyroid nodules 7/31/2017   • Opioid dependence (HCC) " "2014    hx of heavy use of dilaudid   • Ovarian cyst    • Pain 05-08-15    abd., rectum, 3/10   • Pancreatitis 2014   • S/P total colectomy 2014   • Sciatica 8/26/2019   • Thyrotoxicosis 2015    hyper, on methimazole, sees endo   • Type II or unspecified type diabetes mellitus without mention of complication, not stated as uncontrolled 2/2014    NIDDM, sees endo.  no GDM.  diet controlled    • Ulcerative colitis, chronic (HCC)        PHYSICAL EXAM:    /72 (BP Location: Left arm, Patient Position: Sitting, BP Cuff Size: Adult)   Pulse 93   Ht 1.6 m (5' 3\")   Wt 73.9 kg (163 lb)   SpO2 100%   BMI 28.87 kg/m²     Gen. mildly overweight but otherwise appears healthy    Skin   appropriate for sex and age    HEENT  unremarkable    Neck   no adenopathy             No palpable thyroid post thyroidectomy    Heart  regular    Extremities  no edema    Neuro  gait and station normal    Psych  appropriate      ASSESSMENT AND RECOMMENDATIONS    1. Adrenal insufficiency (HCC)            See HPI, apparently resolved             Discontinue hydrocortisone completely next week due in the morning ACTH cortisol levels and discussed by phone.    2. Type 2 diabetes mellitus without complication, without long-term current use of insulin (HCC)              Return to clinic after the holidays to meet [facility]ous[facility] nurse practitioner to consult diabetic status       DISPOSITION: We discussed my FPC the end of this year.  I will follow-up on the adrenal status and refer her to the other providers in this clinic for diabetic management.  She is in agreement.      Sebastian Soler M.D.    Copies to: Pcp Pt States None None  "

## 2022-01-17 DIAGNOSIS — E27.40 ADRENAL INSUFFICIENCY (HCC): ICD-10-CM

## 2022-01-17 RX ORDER — HYDROCORTISONE 10 MG/1
10 TABLET ORAL DAILY
Qty: 30 TABLET | Refills: 3 | Status: SHIPPED | OUTPATIENT
Start: 2022-01-17 | End: 2022-03-25

## 2022-03-16 DIAGNOSIS — E89.0 HYPOTHYROIDISM, POSTSURGICAL: ICD-10-CM

## 2022-03-16 RX ORDER — LEVOTHYROXINE SODIUM 137 UG/1
TABLET ORAL
Qty: 90 TABLET | Refills: 1 | Status: SHIPPED | OUTPATIENT
Start: 2022-03-16 | End: 2022-03-25

## 2022-03-25 ENCOUNTER — TELEPHONE (OUTPATIENT)
Dept: ENDOCRINOLOGY | Facility: MEDICAL CENTER | Age: 42
End: 2022-03-25
Payer: COMMERCIAL

## 2022-03-25 ENCOUNTER — HOSPITAL ENCOUNTER (OUTPATIENT)
Dept: LAB | Facility: MEDICAL CENTER | Age: 42
End: 2022-03-25
Attending: INTERNAL MEDICINE
Payer: COMMERCIAL

## 2022-03-25 ENCOUNTER — OFFICE VISIT (OUTPATIENT)
Dept: ENDOCRINOLOGY | Facility: MEDICAL CENTER | Age: 42
End: 2022-03-25
Attending: INTERNAL MEDICINE
Payer: COMMERCIAL

## 2022-03-25 VITALS
HEIGHT: 63 IN | HEART RATE: 68 BPM | OXYGEN SATURATION: 97 % | DIASTOLIC BLOOD PRESSURE: 62 MMHG | SYSTOLIC BLOOD PRESSURE: 102 MMHG | WEIGHT: 161 LBS | BODY MASS INDEX: 28.53 KG/M2

## 2022-03-25 DIAGNOSIS — E89.0 HYPOTHYROIDISM, POSTSURGICAL: ICD-10-CM

## 2022-03-25 DIAGNOSIS — E11.9 CONTROLLED TYPE 2 DIABETES MELLITUS WITHOUT COMPLICATION, WITHOUT LONG-TERM CURRENT USE OF INSULIN (HCC): ICD-10-CM

## 2022-03-25 DIAGNOSIS — E55.9 VITAMIN D DEFICIENCY: ICD-10-CM

## 2022-03-25 LAB
25(OH)D3 SERPL-MCNC: 41 NG/ML (ref 30–100)
ALBUMIN SERPL BCP-MCNC: 4.2 G/DL (ref 3.2–4.9)
ALBUMIN/GLOB SERPL: 1.7 G/DL
ALP SERPL-CCNC: 78 U/L (ref 30–99)
ALT SERPL-CCNC: 12 U/L (ref 2–50)
ANION GAP SERPL CALC-SCNC: 12 MMOL/L (ref 7–16)
AST SERPL-CCNC: 16 U/L (ref 12–45)
BILIRUB SERPL-MCNC: 0.3 MG/DL (ref 0.1–1.5)
BUN SERPL-MCNC: 14 MG/DL (ref 8–22)
CALCIUM SERPL-MCNC: 9.7 MG/DL (ref 8.4–10.2)
CHLORIDE SERPL-SCNC: 103 MMOL/L (ref 96–112)
CHOLEST SERPL-MCNC: 137 MG/DL (ref 100–199)
CO2 SERPL-SCNC: 24 MMOL/L (ref 20–33)
CREAT SERPL-MCNC: 0.98 MG/DL (ref 0.5–1.4)
CREAT UR-MCNC: 101.85 MG/DL
FASTING STATUS PATIENT QL REPORTED: NORMAL
GFR SERPLBLD CREATININE-BSD FMLA CKD-EPI: 74 ML/MIN/1.73 M 2
GLOBULIN SER CALC-MCNC: 2.5 G/DL (ref 1.9–3.5)
GLUCOSE SERPL-MCNC: 110 MG/DL (ref 65–99)
HBA1C MFR BLD: 6.2 % (ref 0–5.6)
HDLC SERPL-MCNC: 51 MG/DL
INT CON NEG: ABNORMAL
INT CON POS: ABNORMAL
LDLC SERPL CALC-MCNC: 73 MG/DL
MICROALBUMIN UR-MCNC: <1.2 MG/DL
MICROALBUMIN/CREAT UR: NORMAL MG/G (ref 0–30)
POTASSIUM SERPL-SCNC: 4.3 MMOL/L (ref 3.6–5.5)
PROT SERPL-MCNC: 6.7 G/DL (ref 6–8.2)
SODIUM SERPL-SCNC: 139 MMOL/L (ref 135–145)
TRIGL SERPL-MCNC: 63 MG/DL (ref 0–149)
TSH SERPL DL<=0.005 MIU/L-ACNC: 1.97 UIU/ML (ref 0.38–5.33)

## 2022-03-25 PROCEDURE — 99212 OFFICE O/P EST SF 10 MIN: CPT | Performed by: INTERNAL MEDICINE

## 2022-03-25 PROCEDURE — 99214 OFFICE O/P EST MOD 30 MIN: CPT | Performed by: INTERNAL MEDICINE

## 2022-03-25 PROCEDURE — 82043 UR ALBUMIN QUANTITATIVE: CPT

## 2022-03-25 PROCEDURE — 84443 ASSAY THYROID STIM HORMONE: CPT

## 2022-03-25 PROCEDURE — 82570 ASSAY OF URINE CREATININE: CPT

## 2022-03-25 PROCEDURE — 80053 COMPREHEN METABOLIC PANEL: CPT

## 2022-03-25 PROCEDURE — 83036 HEMOGLOBIN GLYCOSYLATED A1C: CPT | Performed by: INTERNAL MEDICINE

## 2022-03-25 PROCEDURE — 36415 COLL VENOUS BLD VENIPUNCTURE: CPT

## 2022-03-25 PROCEDURE — 80061 LIPID PANEL: CPT

## 2022-03-25 PROCEDURE — 82306 VITAMIN D 25 HYDROXY: CPT

## 2022-03-25 RX ORDER — LEVOTHYROXINE SODIUM 137 UG/1
137 TABLET ORAL
Qty: 90 TABLET | Refills: 2 | Status: SHIPPED | OUTPATIENT
Start: 2022-03-25 | End: 2023-01-06 | Stop reason: SDUPTHER

## 2022-03-25 ASSESSMENT — PATIENT HEALTH QUESTIONNAIRE - PHQ9: CLINICAL INTERPRETATION OF PHQ2 SCORE: 0

## 2022-03-25 ASSESSMENT — FIBROSIS 4 INDEX: FIB4 SCORE: 0.67

## 2022-03-25 NOTE — PROGRESS NOTES
Chief Complaint: Follow up for postsurgical hypothyroidism    HPI:     Gala Camarena is a 41 y.o. female here for follow up of postsurgical hypothyroidism.  She underwent a total thyroidectomy in 2018 at [facility] with benign findings.  She also has type 2 diabetes diagnosed in 2015 but it is diet controlled.  She is not on Metformin or other oral hypoglycemic agents and she is happy with this.  She does have a history of ulcerative colitis with previous total colectomy and history of chronic back pain secondary to degenerative disc disease and multiple herniated disc that she controls with over-the-counter Advil.      For her thyroid she is on levothyroxine 137 MCG daily which has been her medication for the past year she reports good compliance and denies missing any daily doses.  She takes her thyroid horn before breakfast.  She denies taking any iron, calcium, and antacids.    Her weight is stable.    Her energy is good and she denies constipation and cold intolerance.  We do not have updated thyroid labs on him.  Her last TSH was normal 2.9 back on January 2021.      With respect to her diabetes.  Her A1c today was 6.2% on March 25, 2022.  Previously her A1c was 6.6%  Again she is on oral hypoglycemic agents.  She denies any diabetes related vascular complications such as neuropathy, retinopathy,  She is overdue for an eye exam  We do not have updated labs including a fasting lipid, serum creatinine, urine microalbumin      Finally she has a history of vitamin D deficiency, we do not have updated labs this        Patient's medications, allergies, and social histories were reviewed and updated as appropriate.      ROS:     CONS:     No fever, no chills   EYES:     No diplopia, no blurry vision   CV:           No chest pain, no palpitations   PULM:     No SOB, no cough, no hemoptysis.   GI:            No nausea, no vomiting, no diarrhea, no constipation   ENDO:     No polyuria, no polydipsia, no heat  intolerance, no cold intolerance       Past Medical History:  Problem List:  2019-09: Adrenal insufficiency ([facility])  2019-08: Sciatica  2019-01: Hypothyroidism, postsurgical  2017-07: Multiple thyroid nodules  2016-06: IUD (intrauterine device) in place-mirena 8/2013 2015-12: Acne vulgaris  2015-12: Type 2 diabetes mellitus  2015-06: Type II diabetes mellitus ([facility])  2015-05: Attention to ileostomy (CMS-HCC)  2015-05: Ulcerative colitis (CMS-HCC)  2015-03: Chronic abdominal pain  2014-11: Thyrotoxicosis  2014-11: Headache, classical migraine  2014-08: Adrenal insufficiency ([facility])  2014-08: 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[facility])  2014-05: Ulcerative colitis ([facility])  2014-04: Opioid dependence (CMS-HCC)  2014-04: Mixed anxiety and depressive disorder  2014-04: S/P colectomy  IgA gammopathy      Past Surgical History:  Past Surgical History:   Procedure Laterality Date   • THYROIDECTOMY TOTAL  10/31/2018    Procedure: THYROIDECTOMY TOTAL- OR NEAR TOTAL;  Surgeon: Joshua Douglass M.D.;  Location: SURGERY SAME DAY [facility];  Service: General   • THYROIDECTOMY TOTAL  2018    Thyrotoxicosis and benign nodules   • TRANSANAL PARTIAL PROCTECTOMY  5/20/2015    Procedure: TRANSANAL PARTIAL PROCTECTOMY Ileoanal J Pouch;  Surgeon: Smith Granado M.D.;  Location: SURGERY [facility];  Service:    • LOW ANTERIOR RESECTION  5/20/2015    Procedure: LOW ANTERIOR RESECTION;  Surgeon: Smith Granado M.D.;  Location: SURGERY [facility];  Service:    • EGD WITH ASP/BX  8/14     [facility]   • EXPLORATORY LAPAROTOMY  5/19/2014    Performed by Smith Granado M.D. at SURGERY [facility]   • ILEO LOOP DIVERSION  5/19/2014    Performed by Smith Granado M.D. at SURGERY [facility]   • GASTROSCOPY-ENDO  5/14/2014    Performed by Celio Hill M.D. at ENDOSCOPY [facility]   • SIGMOIDOSCOPY FLEX  4/6/2014    Performed by Mauro Mercedes Jr., M.D. at ENDOSCOPY [facility]   • COLECTOMY  3/19/2014    Performed by Smith Granado M.D.  "at SURGERY [facility]S   • ILEOSTOMY  3/19/2014    Performed by Smith Granado M.D. at SURGERY [facility] ORS   • COLONOSCOPY - ENDO  3/4/2014    Performed by Hernan Luna M.D. at SURGERY [facility] ORS   • CHOLECYSTECTOMY  2014   • OVARIAN CYSTECTOMY      2007        Allergies:  Ativan, Compazine, Prochlorperazine, and Tape     Social History:  Social History     Tobacco Use   • Smoking status: Current Some Day Smoker     Packs/day: 0.00     Years: 13.00     Pack years: 0.00     Types: Cigarettes   • Smokeless tobacco: Never Used   Vaping Use   • Vaping Use: Never used   Substance Use Topics   • Alcohol use: No     Comment: *social smoking    • Drug use: Yes     Types: Marijuana        Family History:   family history includes Cancer in her maternal grandmother; Diabetes in her mother; Heart Disease in her maternal grandfather; Hypertension in her maternal aunt and mother.      PHYSICAL EXAM:   Vital signs: /62   Pulse 68   Ht 1.6 m (5' 3\")   Wt 73 kg (161 lb)   SpO2 97%   BMI 28.52 kg/m²   GENERAL: Well-developed, well-nourished in no apparent distress.   EYE:  No ocular asymmetry, PERRLA  HENT: Pink, moist mucous membranes.    NECK: No thyromegaly.   CARDIOVASCULAR:  No murmurs  LUNGS: Clear breath sounds  ABDOMEN: Soft, nontender   EXTREMITIES: No clubbing, cyanosis, or edema.   NEUROLOGICAL: No gross focal motor abnormalities   LYMPH: No cervical adenopathy palpated.   SKIN: No rashes, lesions.       Labs:  Lab Results   Component Value Date/Time    SODIUM 138 11/20/2021 07:55 AM    POTASSIUM 4.6 11/20/2021 07:55 AM    CHLORIDE 106 11/20/2021 07:55 AM    CO2 24 11/20/2021 07:55 AM    ANION 8.0 11/20/2021 07:55 AM    GLUCOSE 118 (H) 11/20/2021 07:55 AM    BUN 15 11/20/2021 07:55 AM    CREATININE 0.93 11/20/2021 07:55 AM    CALCIUM 9.8 11/20/2021 07:55 AM    ASTSGOT 11 (L) 11/20/2021 07:55 AM    ALTSGPT 8 11/20/2021 07:55 AM    TBILIRUBIN 0.3 11/20/2021 07:55 AM    ALBUMIN 4.8 11/20/2021 07:55 AM "    ALBUMIN 3.28 (L) 08/18/2014 12:04 PM    TOTPROTEIN 7.4 11/20/2021 07:55 AM    TOTPROTEIN 7.20 08/18/2014 12:04 PM    GLOBULIN 2.6 11/20/2021 07:55 AM    AGRATIO 1.8 11/20/2021 07:55 AM       Lab Results   Component Value Date/Time    SODIUM 138 11/20/2021 0755    POTASSIUM 4.6 11/20/2021 0755    CHLORIDE 106 11/20/2021 0755    CO2 24 11/20/2021 0755    GLUCOSE 118 (H) 11/20/2021 0755    BUN 15 11/20/2021 0755    CREATININE 0.93 11/20/2021 0755    CALCIUM 9.8 11/20/2021 0755    ANION 8.0 11/20/2021 0755       Lab Results   Component Value Date/Time    CHOLSTRLTOT 158 02/13/2016 1002    TRIGLYCERIDE 54 02/13/2016 1002    HDL 66 02/13/2016 1002    LDL 81 02/13/2016 1002       Lab Results   Component Value Date/Time    TSHULTRASEN 2.950 11/20/2021 0755     Lab Results   Component Value Date/Time    FREET4 1.53 11/20/2021 0755     Lab Results   Component Value Date/Time    FREET3 4.21 (H) 07/31/2017 0648     Lab Results   Component Value Date/Time    THYSTIMIG 92 03/28/2016 1040       Lab Results   Component Value Date/Time    MICROSOMALA 0.8 06/26/2017 0742         Imaging:      ASSESSMENT/PLAN:     1. Hypothyroidism, postsurgical  Controlled  She prefers generic medication because of cost  Continue levothyroxine 137 MCG daily  We will repeat her TSH today and in 6 months    2. Controlled type 2 diabetes mellitus without complication, without long-term current use of insulin (HCC)  Stable diet controlled  A1c is 6.2%  Offered [employer] nutrition visit but patient declined  I want her to get labs today to check her serum creatinine, fasting lipid, TSH, urine albumin and I will update her  I recommended she continue diet and exercise and lifestyle changes  We will repeat her A1c in 6 months    3. Vitamin D deficiency  We will check calcium vitamin D levels today and in 6 months      Return in about 6 months (around 9/25/2022).      Thank you kindly for allowing me to participate in the thyroid care plan for this  patient.    Andriy James MD, FACE, [facility]  03/25/22    CC:   Pcp Pt States None

## 2022-12-05 ENCOUNTER — HOSPITAL ENCOUNTER (EMERGENCY)
Facility: MEDICAL CENTER | Age: 42
End: 2022-12-05
Payer: COMMERCIAL

## 2022-12-05 ENCOUNTER — OFFICE VISIT (OUTPATIENT)
Dept: URGENT CARE | Facility: PHYSICIAN GROUP | Age: 42
End: 2022-12-05
Payer: COMMERCIAL

## 2022-12-05 VITALS
BODY MASS INDEX: 24.8 KG/M2 | SYSTOLIC BLOOD PRESSURE: 118 MMHG | DIASTOLIC BLOOD PRESSURE: 82 MMHG | TEMPERATURE: 97.6 F | RESPIRATION RATE: 14 BRPM | HEART RATE: 71 BPM | WEIGHT: 140 LBS | HEIGHT: 63 IN | OXYGEN SATURATION: 100 %

## 2022-12-05 DIAGNOSIS — R53.83 OTHER FATIGUE: ICD-10-CM

## 2022-12-05 DIAGNOSIS — R42 EPISODIC LIGHTHEADEDNESS: ICD-10-CM

## 2022-12-05 DIAGNOSIS — Z90.49 HISTORY OF COLECTOMY: ICD-10-CM

## 2022-12-05 DIAGNOSIS — Z86.39 HISTORY OF DEHYDRATION: ICD-10-CM

## 2022-12-05 DIAGNOSIS — E27.1 ADRENAL INSUFFICIENCY (ADDISON'S DISEASE) (HCC): ICD-10-CM

## 2022-12-05 DIAGNOSIS — Z86.39 HISTORY OF ENDOCRINE DISORDER: ICD-10-CM

## 2022-12-05 PROCEDURE — 99205 OFFICE O/P NEW HI 60 MIN: CPT | Performed by: NURSE PRACTITIONER

## 2022-12-05 ASSESSMENT — FIBROSIS 4 INDEX: FIB4 SCORE: 0.82

## 2022-12-05 NOTE — PROGRESS NOTES
"Gala Camarena is a 42 y.o. female who presents for Dehydration (X 1 month; went to [facility] and they said severely dehydrated. Can't keep anything down. )      HPI  This is a new problem. Gala Camarena is a 42 y.o. patient who presents to urgent care with c/o: can't keep anything down. Was tested for everything about a week ago. Urine is very dark. Feeling dehydrated and dizzy. Hx of colectomy and always has watery stools. Now increased liquid stool.   Thinks that her adrenal glands could be shutting down which has made her feel this way now.   Was recently seen at [facility] and was told that she was severely dehydration.   Treatments tried: increased fluids  Denies fever, chills, body aches, sore throat, cough, nausea, abd pain, bloody stools, melena.    No other aggravating or alleviating factors.       ROS See HPI    Allergies:       Allergies   Allergen Reactions    Ativan      Hallucinations, restless    Compazine Palpitations     Hypertension    Prochlorperazine Palpitations     \"Compazine\"; severe HTN    Tape Rash     Paper tape and tegaderm is okay.       PMSFS Hx:  Past Medical History:   Diagnosis Date    Adrenal insufficiency (HCC) 2014    21-hydroxylase antibody = neg    Anxiety     in remission    Blood transfusion, without reported diagnosis 8/2013    Chronic erosive gastritis 8/14    Dr Mercedes    Depression     in remission    GERD (gastroesophageal reflux disease)     omeprazole    Headache, classical migraine 11/2014    since 8yoa, triggers=unk, imitrex works well.  freq=3-4x/month.      Heart burn     Hypercalcemia 2014    Hypothyroidism, postsurgical     IgA gammopathy 2014    Indigestion     Kidney disease     hx of pyelo, outpat tx    Mixed anxiety and depressive disorder 2014    Multiple thyroid nodules 7/31/2017    Opioid dependence (HCC) 2014    hx of heavy use of dilaudid    Ovarian cyst     Pain 05-08-15    abd., rectum, 3/10    Pancreatitis 2014    S/P total " colectomy 2014    Sciatica 8/26/2019    Thyrotoxicosis 2015    hyper, on methimazole, sees endo    Type II or unspecified type diabetes mellitus without mention of complication, not stated as uncontrolled 2/2014    NIDDM, sees endo.  no GDM.  diet controlled     Ulcerative colitis, chronic (HCC)      Past Surgical History:   Procedure Laterality Date    THYROIDECTOMY TOTAL  10/31/2018    Procedure: THYROIDECTOMY TOTAL- OR NEAR TOTAL;  Surgeon: Joshua Douglass M.D.;  Location: SURGERY SAME DAY [facility];  Service: General    THYROIDECTOMY TOTAL  2018    Thyrotoxicosis and benign nodules    TRANSANAL PARTIAL PROCTECTOMY  5/20/2015    Procedure: TRANSANAL PARTIAL PROCTECTOMY Ileoanal J Pouch;  Surgeon: Smith Granado M.D.;  Location: SURGERY [facility];  Service:     LOW ANTERIOR RESECTION  5/20/2015    Procedure: LOW ANTERIOR RESECTION;  Surgeon: Smith Granado M.D.;  Location: SURGERY [facility];  Service:     EGD WITH ASP/BX  8/14     [facility]    EXPLORATORY LAPAROTOMY  5/19/2014    Performed by Smith Granado M.D. at SURGERY [facility]    ILEO LOOP DIVERSION  5/19/2014    Performed by Smith Granado M.D. at SURGERY [facility]    GASTROSCOPY-ENDO  5/14/2014    Performed by Celio Hill M.D. at ENDOSCOPY [facility]    SIGMOIDOSCOPY FLEX  4/6/2014    Performed by Mauro Mercedes Jr., M.D. at ENDOSCOPY [facility]    COLECTOMY  3/19/2014    Performed by Smith Granado M.D. at SURGERY [facility]    ILEOSTOMY  3/19/2014    Performed by Smith Granado M.D. at SURGERY [facility]    COLONOSCOPY - ENDO  3/4/2014    Performed by Hernan Luna M.D. at SURGERY [facility]    CHOLECYSTECTOMY  2014    OVARIAN CYSTECTOMY      2007     Family History   Problem Relation Age of Onset    Hypertension Mother     Diabetes Mother     Cancer Maternal Grandmother         Ovarian    Heart Disease Maternal Grandfather         chf    Hypertension Maternal Aunt      Social History     Tobacco Use     "Smoking status: Every Day     Packs/day: 0.00     Years: 13.00     Pack years: 0.00     Types: Cigarettes    Smokeless tobacco: Never   Substance Use Topics    Alcohol use: No     Comment: *social smoking        Problems:   Patient Active Problem List   Diagnosis    S/P colectomy    Ulcerative colitis (HCC)    Mixed anxiety and depressive disorder    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HCC)    IgA gammopathy    Headache, classical migraine    Chronic abdominal pain    Ulcerative colitis (CMS-HCC)    Type 2 diabetes mellitus    Acne vulgaris    IUD (intrauterine device) in place-mirena 8/2013    Hypothyroidism, postsurgical    Sciatica    Adrenal insufficiency (HCC)       Medications:   Current Outpatient Medications on File Prior to Visit   Medication Sig Dispense Refill    levothyroxine (SYNTHROID) 137 MCG Tab Take 1 Tablet by mouth every morning on an empty stomach. 90 Tablet 2    therapeutic multivitamin-minerals (THERAGRAN-M) Tab Take 1 tablet by mouth every day.      SUMAtriptan (IMITREX) 50 MG Tab TAKE ONE TABLET BY MOUTH ONE TIME AS NEEDED FOR MIGRAINE; MAY REPEAT ONE TABLET IN 2 HOURS IF HEADACHE PERSISTS. MAX OF 2 TABLETS A DAY 9 Tab 11    Calcium Carb-Cholecalciferol (OYSTER SHELL CALCIUM/VITAMIN D) 250-125 MG-UNIT Tab tablet Take 2 Tabs by mouth 3 times a day. 60 Tab 0    Ibuprofen 200 MG Cap Take 4 Caps by mouth as needed. Indications: Mild to Moderate Pain       No current facility-administered medications on file prior to visit.          Objective:     /82   Pulse 71   Temp 36.4 °C (97.6 °F)   Resp 14   Ht 1.6 m (5' 3\")   Wt 63.5 kg (140 lb)   SpO2 100%   BMI 24.80 kg/m²     Physical Exam  Vitals and nursing note reviewed.   Constitutional:       Appearance: She is normal weight. She is ill-appearing.   HENT:      Mouth/Throat:      Mouth: Mucous membranes are moist.      Pharynx: Posterior oropharyngeal erythema present.      Comments: Thick coating on tongue    Cardiovascular:      Rate and " Rhythm: Normal rate.      Pulses: Normal pulses.      Heart sounds: Normal heart sounds.   Pulmonary:      Effort: Pulmonary effort is normal.   Abdominal:      Palpations: Abdomen is soft.   Skin:     General: Skin is warm and dry.      Capillary Refill: Capillary refill takes less than 2 seconds.   Neurological:      Mental Status: She is alert and oriented to person, place, and time.   Psychiatric:         Mood and Affect: Mood normal.         Behavior: Behavior normal.         Thought Content: Thought content normal.         Assessment /Associated Orders:      1. History of dehydration        2. History of endocrine disorder        3. History of colectomy        4. Other fatigue        5. Episodic lightheadedness        6. Adrenal insufficiency (Zach's disease) (HCC)              Medical Decision Making:        Pt's clinical presentation and exam today indicate a need for higher level of care with further evaluation and/or diagnostics. Vital signs stable today in urgent care. Pt reports that she feels like her cortisol level may be low and she would need a corticosteroid.  She feels she is becoming severely dehydrated.  She was seen recently at [facility] ER and had some IV hydration but feels it was not long-lasting it helping her feel better.  She said they did do a lot of labs but did not really tell her the results of her tests.  [facility] was  called to arrange transfer to higher level of care in ER.  Pt is to be transported via POV with her spouse later today.   I have reiterated to patient that although an Urgent Care to ER transfer was made this will not necessarily expedite the ER process        Please note that this dictation was created using voice recognition software. I have worked with consultants from the vendor as well as technical experts from [facility] to optimize the interface. I have made every reasonable attempt to correct obvious errors, but I expect that there are  errors of grammar and possibly content that I did not discover before finalizing the note.  This note was electronically signed by provider

## 2022-12-30 DIAGNOSIS — K21.9 GASTRIC REFLUX: ICD-10-CM

## 2023-01-03 RX ORDER — FAMOTIDINE 20 MG/1
TABLET, FILM COATED ORAL
Qty: 60 TABLET | Refills: 0 | Status: SHIPPED | OUTPATIENT
Start: 2023-01-03 | End: 2023-01-06

## 2023-01-04 DIAGNOSIS — E27.40 ADRENAL INSUFFICIENCY (HCC): ICD-10-CM

## 2023-01-04 DIAGNOSIS — E11.9 CONTROLLED TYPE 2 DIABETES MELLITUS WITHOUT COMPLICATION, WITHOUT LONG-TERM CURRENT USE OF INSULIN (HCC): ICD-10-CM

## 2023-01-04 DIAGNOSIS — E55.9 VITAMIN D DEFICIENCY: ICD-10-CM

## 2023-01-04 DIAGNOSIS — E89.0 HYPOTHYROIDISM, POSTSURGICAL: ICD-10-CM

## 2023-01-05 ENCOUNTER — HOSPITAL ENCOUNTER (OUTPATIENT)
Dept: LAB | Facility: MEDICAL CENTER | Age: 43
End: 2023-01-05
Attending: INTERNAL MEDICINE
Payer: COMMERCIAL

## 2023-01-05 DIAGNOSIS — E27.40 ADRENAL INSUFFICIENCY (HCC): ICD-10-CM

## 2023-01-05 DIAGNOSIS — E11.9 CONTROLLED TYPE 2 DIABETES MELLITUS WITHOUT COMPLICATION, WITHOUT LONG-TERM CURRENT USE OF INSULIN (HCC): ICD-10-CM

## 2023-01-05 DIAGNOSIS — E89.0 HYPOTHYROIDISM, POSTSURGICAL: ICD-10-CM

## 2023-01-05 DIAGNOSIS — E55.9 VITAMIN D DEFICIENCY: ICD-10-CM

## 2023-01-05 LAB
25(OH)D3 SERPL-MCNC: 30 NG/ML (ref 30–100)
ALBUMIN SERPL BCP-MCNC: 4.4 G/DL (ref 3.2–4.9)
ALBUMIN/GLOB SERPL: 1.5 G/DL
ALP SERPL-CCNC: 78 U/L (ref 30–99)
ALT SERPL-CCNC: 10 U/L (ref 2–50)
ANION GAP SERPL CALC-SCNC: 12 MMOL/L (ref 7–16)
AST SERPL-CCNC: 14 U/L (ref 12–45)
BILIRUB SERPL-MCNC: 0.4 MG/DL (ref 0.1–1.5)
BUN SERPL-MCNC: 12 MG/DL (ref 8–22)
CALCIUM ALBUM COR SERPL-MCNC: 9.1 MG/DL (ref 8.5–10.5)
CALCIUM SERPL-MCNC: 9.4 MG/DL (ref 8.5–10.5)
CHLORIDE SERPL-SCNC: 104 MMOL/L (ref 96–112)
CHOLEST SERPL-MCNC: 155 MG/DL (ref 100–199)
CO2 SERPL-SCNC: 22 MMOL/L (ref 20–33)
CORTIS SERPL-MCNC: 9.2 UG/DL (ref 0–23)
CREAT SERPL-MCNC: 0.91 MG/DL (ref 0.5–1.4)
CREAT UR-MCNC: 220.29 MG/DL
FASTING STATUS PATIENT QL REPORTED: NORMAL
GFR SERPLBLD CREATININE-BSD FMLA CKD-EPI: 80 ML/MIN/1.73 M 2
GLOBULIN SER CALC-MCNC: 3 G/DL (ref 1.9–3.5)
GLUCOSE SERPL-MCNC: 105 MG/DL (ref 65–99)
HDLC SERPL-MCNC: 56 MG/DL
LDLC SERPL CALC-MCNC: 88 MG/DL
MICROALBUMIN UR-MCNC: <1.2 MG/DL
MICROALBUMIN/CREAT UR: NORMAL MG/G (ref 0–30)
POTASSIUM SERPL-SCNC: 3.9 MMOL/L (ref 3.6–5.5)
PROT SERPL-MCNC: 7.4 G/DL (ref 6–8.2)
SODIUM SERPL-SCNC: 138 MMOL/L (ref 135–145)
T4 FREE SERPL-MCNC: 1.82 NG/DL (ref 0.93–1.7)
TRIGL SERPL-MCNC: 57 MG/DL (ref 0–149)
TSH SERPL DL<=0.005 MIU/L-ACNC: 1.58 UIU/ML (ref 0.38–5.33)

## 2023-01-05 PROCEDURE — 82306 VITAMIN D 25 HYDROXY: CPT

## 2023-01-05 PROCEDURE — 82024 ASSAY OF ACTH: CPT

## 2023-01-05 PROCEDURE — 84443 ASSAY THYROID STIM HORMONE: CPT

## 2023-01-05 PROCEDURE — 82533 TOTAL CORTISOL: CPT

## 2023-01-05 PROCEDURE — 82043 UR ALBUMIN QUANTITATIVE: CPT

## 2023-01-05 PROCEDURE — 82570 ASSAY OF URINE CREATININE: CPT

## 2023-01-05 PROCEDURE — 36415 COLL VENOUS BLD VENIPUNCTURE: CPT

## 2023-01-05 PROCEDURE — 80061 LIPID PANEL: CPT

## 2023-01-05 PROCEDURE — 80053 COMPREHEN METABOLIC PANEL: CPT

## 2023-01-05 PROCEDURE — 84439 ASSAY OF FREE THYROXINE: CPT

## 2023-01-05 PROCEDURE — 83516 IMMUNOASSAY NONANTIBODY: CPT

## 2023-01-06 ENCOUNTER — OFFICE VISIT (OUTPATIENT)
Dept: ENDOCRINOLOGY | Facility: MEDICAL CENTER | Age: 43
End: 2023-01-06
Attending: INTERNAL MEDICINE
Payer: COMMERCIAL

## 2023-01-06 VITALS
DIASTOLIC BLOOD PRESSURE: 70 MMHG | HEIGHT: 63 IN | SYSTOLIC BLOOD PRESSURE: 124 MMHG | BODY MASS INDEX: 26.93 KG/M2 | WEIGHT: 152 LBS | HEART RATE: 80 BPM

## 2023-01-06 DIAGNOSIS — E78.5 DYSLIPIDEMIA: ICD-10-CM

## 2023-01-06 DIAGNOSIS — E11.9 CONTROLLED TYPE 2 DIABETES MELLITUS WITHOUT COMPLICATION, WITHOUT LONG-TERM CURRENT USE OF INSULIN (HCC): ICD-10-CM

## 2023-01-06 DIAGNOSIS — E55.9 VITAMIN D DEFICIENCY: ICD-10-CM

## 2023-01-06 DIAGNOSIS — E89.0 HYPOTHYROIDISM, POSTSURGICAL: ICD-10-CM

## 2023-01-06 DIAGNOSIS — E27.40 ADRENAL INSUFFICIENCY (HCC): ICD-10-CM

## 2023-01-06 LAB
HBA1C MFR BLD: 6.8 % (ref 0–5.6)
INT CON NEG: ABNORMAL
INT CON POS: ABNORMAL

## 2023-01-06 PROCEDURE — 99212 OFFICE O/P EST SF 10 MIN: CPT | Performed by: INTERNAL MEDICINE

## 2023-01-06 PROCEDURE — 83036 HEMOGLOBIN GLYCOSYLATED A1C: CPT | Performed by: INTERNAL MEDICINE

## 2023-01-06 PROCEDURE — 92250 FUNDUS PHOTOGRAPHY W/I&R: CPT | Performed by: INTERNAL MEDICINE

## 2023-01-06 PROCEDURE — 99214 OFFICE O/P EST MOD 30 MIN: CPT | Performed by: INTERNAL MEDICINE

## 2023-01-06 RX ORDER — CHOLECALCIFEROL (VITAMIN D3) 50 MCG
TABLET ORAL DAILY
COMMUNITY

## 2023-01-06 RX ORDER — LEVOTHYROXINE SODIUM 137 UG/1
137 TABLET ORAL
Qty: 90 TABLET | Refills: 3 | Status: SHIPPED | OUTPATIENT
Start: 2023-01-06

## 2023-01-06 RX ORDER — ATORVASTATIN CALCIUM 20 MG/1
20 TABLET, FILM COATED ORAL DAILY
Qty: 90 TABLET | Refills: 3 | Status: SHIPPED | OUTPATIENT
Start: 2023-01-06 | End: 2024-01-11

## 2023-01-06 ASSESSMENT — PATIENT HEALTH QUESTIONNAIRE - PHQ9: CLINICAL INTERPRETATION OF PHQ2 SCORE: 0

## 2023-01-06 ASSESSMENT — FIBROSIS 4 INDEX: FIB4 SCORE: 0.78

## 2023-01-06 NOTE — PROGRESS NOTES
Chief Complaint: Follow up for postsurgical hypothyroidism and diet-controlled type 2 diabetes    HPI:     Gala Camarena is a 42 y.o. female here for follow up of of the above medical issues      She underwent a total thyroidectomy in 2018 at [facility] with benign findings.  She also has type 2 diabetes diagnosed in 2015 but it is diet controlled.    She is not on Metformin or other oral hypoglycemic agents which is her personal preference  She does have a history of ulcerative colitis with previous total colectomy and history of chronic back pain (CLBP) secondary to degenerative disc disease and multiple herniated disc.   She controls her CLBP with over-the-counter Advil.  Social history wise she works for the [employer]        She is on Levothyroxine 137 MCG daily   She has been on this medication for the past 2 years.    She reports good compliance and denies missing any daily doses.  She denies taking any iron, calcium, and antacids.    Her weight is stable.  She denies constipation cold intolerance  She denies tremors and palpitations  Her TSH is normal 1.58 on January 5, 2023        With respect to her diet controlled type 2 diabetes.    Her A1c today is 6.8% on January 6, 2023  Her A1c was 6.2% on March 25, 2022.  She admits that she has been eating more baked goods over the holidays  She denies any diabetes related vascular complications such as neuropathy, retinopathy, and nephropathy    She is currently not on a statin for prior prevention  We talked about initiating atorvastatin for primary prevention  Her LDL cholesterol was 88 on January 5, 2023  She does not have albuminuria  Her U ACR was less than 30 on January 5, 2023    We are getting a point-of-care eye exam today      Her vitamin D is 30 on general 5/20/2023 with normal calcium levels          Patient's medications, allergies, and social histories were reviewed and updated as appropriate.      ROS:     CONS:     No fever, no chills   EYES:      No diplopia, no blurry vision   CV:           No chest pain, no palpitations   PULM:     No SOB, no cough, no hemoptysis.   GI:            No nausea, no vomiting, no diarrhea, no constipation   ENDO:     No polyuria, no polydipsia, no heat intolerance, no cold intolerance       Past Medical History:  Problem List:  2023-01: Dyslipidemia  2019-09: Adrenal insufficiency ([facility])  2019-08: Sciatica  2019-01: Hypothyroidism, postsurgical  2017-07: Multiple thyroid nodules  2016-06: IUD (intrauterine device) in place-mirena 8/2013 2015-12: Acne vulgaris  2015-12: Controlled type 2 diabetes mellitus without complication,   without long-term current use of insulin ([facility])  2015-06: Type II diabetes mellitus ([facility])  2015-05: Attention to ileostomy (CMS-HCC)  2015-05: Ulcerative colitis (CMS-HCC)  2015-03: Chronic abdominal pain  2014-11: Thyrotoxicosis  2014-11: Headache, classical migraine  2014-08: Adrenal insufficiency ([facility])  2014-08: 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[facility])  2014-05: Ulcerative colitis ([facility])  2014-04: Opioid dependence (CMS-HCC)  2014-04: Mixed anxiety and depressive disorder  2014-04: S/P colectomy  IgA gammopathy      Past Surgical History:  Past Surgical History:   Procedure Laterality Date    THYROIDECTOMY TOTAL  10/31/2018    Procedure: THYROIDECTOMY TOTAL- OR NEAR TOTAL;  Surgeon: Joshua Douglass M.D.;  Location: SURGERY SAME DAY [facility];  Service: General    THYROIDECTOMY TOTAL  2018    Thyrotoxicosis and benign nodules    TRANSANAL PARTIAL PROCTECTOMY  5/20/2015    Procedure: TRANSANAL PARTIAL PROCTECTOMY Ileoanal J Pouch;  Surgeon: Smith Granado M.D.;  Location: SURGERY [facility];  Service:     LOW ANTERIOR RESECTION  5/20/2015    Procedure: LOW ANTERIOR RESECTION;  Surgeon: Smith Granado M.D.;  Location: SURGERY [facility];  Service:     EGD WITH ASP/BX  8/14     Mer[place]    EXPLORATORY LAPAROTOMY  5/19/2014    Performed by Smith Granado M.D. at [facility]    ILEO LOOP  DIVERSION  5/19/2014    Performed by Smith Granado M.D. at SURGERY [facility]    GASTROSCOPY-ENDO  5/14/2014    Performed by Celio Hill M.D. at ENDOSCOPY [facility]    SIGMOIDOSCOPY FLEX  4/6/2014    Performed by Mauro Mercedes Jr., M.D. at ENDOSCOPY [facility]    COLECTOMY  3/19/2014    Performed by Smith Granado M.D. at SURGERY [facility]    ILEOSTOMY  3/19/2014    Performed by Smith Granado M.D. at SURGERY [facility]    COLONOSCOPY - ENDO  3/4/2014    Performed by Hernan Luna M.D. at SURGERY [facility]    CHOLECYSTECTOMY  2014    OVARIAN CYSTECTOMY      2007        Allergies:  Ativan, Compazine, Prochlorperazine, and Tape     Social History:  Social History     Tobacco Use    Smoking status: Every Day     Packs/day: 0.00     Years: 13.00     Pack years: 0.00     Types: Cigarettes    Smokeless tobacco: Never   Vaping Use    Vaping Use: Never used   Substance Use Topics    Alcohol use: No     Comment: *social smoking     Drug use: Yes     Types: Marijuana        Family History:   family history includes Cancer in her maternal grandmother; Diabetes in her mother; Heart Disease in her maternal grandfather; Hypertension in her maternal aunt and mother.      PHYSICAL EXAM:   Vital signs: There were no vitals taken for this visit.  GENERAL: Well-developed, well-nourished in no apparent distress.   EYE:  No ocular asymmetry, PERRLA  HENT: Pink, moist mucous membranes.    NECK: No thyromegaly.   CARDIOVASCULAR:  No murmurs  LUNGS: Clear breath sounds  ABDOMEN: Soft, nontender   EXTREMITIES: No clubbing, cyanosis, or edema.   NEUROLOGICAL: No gross focal motor abnormalities   LYMPH: No cervical adenopathy palpated.   SKIN: No rashes, lesions.       Labs:  Lab Results   Component Value Date/Time    SODIUM 138 01/05/2023 08:18 AM    POTASSIUM 3.9 01/05/2023 08:18 AM    CHLORIDE 104 01/05/2023 08:18 AM    CO2 22 01/05/2023 08:18 AM    ANION 12.0 01/05/2023 08:18 AM    GLUCOSE 105 (H)  01/05/2023 08:18 AM    BUN 12 01/05/2023 08:18 AM    CREATININE 0.91 01/05/2023 08:18 AM    CALCIUM 9.4 01/05/2023 08:18 AM    ASTSGOT 14 01/05/2023 08:18 AM    ALTSGPT 10 01/05/2023 08:18 AM    TBILIRUBIN 0.4 01/05/2023 08:18 AM    ALBUMIN 4.4 01/05/2023 08:18 AM    ALBUMIN 3.28 (L) 08/18/2014 12:04 PM    TOTPROTEIN 7.4 01/05/2023 08:18 AM    TOTPROTEIN 7.20 08/18/2014 12:04 PM    GLOBULIN 3.0 01/05/2023 08:18 AM    AGRATIO 1.5 01/05/2023 08:18 AM       Lab Results   Component Value Date/Time    SODIUM 138 11/20/2021 0755    POTASSIUM 4.6 11/20/2021 0755    CHLORIDE 106 11/20/2021 0755    CO2 24 11/20/2021 0755    GLUCOSE 118 (H) 11/20/2021 0755    BUN 15 11/20/2021 0755    CREATININE 0.93 11/20/2021 0755    CALCIUM 9.8 11/20/2021 0755    ANION 8.0 11/20/2021 0755       Lab Results   Component Value Date/Time    CHOLSTRLTOT 158 02/13/2016 1002    TRIGLYCERIDE 54 02/13/2016 1002    HDL 66 02/13/2016 1002    LDL 81 02/13/2016 1002       Lab Results   Component Value Date/Time    TSHULTRASEN 2.950 11/20/2021 0755     Lab Results   Component Value Date/Time    FREET4 1.53 11/20/2021 0755     Lab Results   Component Value Date/Time    FREET3 4.21 (H) 07/31/2017 0648     Lab Results   Component Value Date/Time    THYSTIMIG 92 03/28/2016 1040       Lab Results   Component Value Date/Time    MICROSOMALA 0.8 06/26/2017 0742         Imaging:      ASSESSMENT/PLAN:     1. Controlled type 2 diabetes mellitus without complication, without long-term current use of insulin (HCC)  Stable   she is diet controlled  She prefers not to take oral agents which is reasonable  Continue diet and exercise   she is up-to-date with her labs  We are getting a point-of-care eye exam today  Follow-up in 1 year with repeat labs  She should notify me if she has symptoms of uncontrolled hyperglycemia uncontrolled type 2 diabetes    2. Hypothyroidism, postsurgical  Controlled  Continue levothyroxine 137 MCG daily  Reviewed proper administration of  thyroid hormone  Patient should take thyroid hormone 30-60 minutes before breakfast on an empty stomach plain water and not take it together with food, iron, calcium, and antacids.  Iron, calcium, and antacids should be taken at least 4 hours apart from thyroid hormone.  Repeat TSH levels in 6 to 12 months    3. Dyslipidemia  Stable  Start atorvastatin for primary prevention of cardiovascular disease because of her underlying type 2 diabetes  Reviewed side effects and proper administration of atorvastatin  Repeat fasting lipids in 6 to 12 months    4. Adrenal insufficiency (HCC)  Resolved her morning cortisol levels are normal I am awaiting her ACTH and other labs but it does not appear that she has adrenal insufficiency    5. Vitamin D deficiency  Stable   Vitamin D labs were reviewed with patient  Continue current supplements  Continue monitoring levels       Return in about 1 year (around 1/6/2024).      Thank you kindly for allowing me to participate in the thyroid care plan for this patient.    Andriy James MD, NUVIA, NU      CC:   Pcp Pt States None

## 2023-01-07 LAB — RETINAL SCREEN: POSITIVE

## 2023-01-08 LAB — ACTH PLAS-MCNC: 14.2 PG/ML (ref 7.2–63.3)

## 2023-01-12 LAB — 21HYDROXYLASE AB SER QL: NEGATIVE

## 2023-05-24 NOTE — TELEPHONE ENCOUNTER
Patient came in for appointment today. Informed patient that we need an updated referral from their PCP and to request this. Patient verbalized understanding.      bilateral UE Active ROM was WNL (within normal limits)

## 2024-01-04 DIAGNOSIS — E11.9 CONTROLLED TYPE 2 DIABETES MELLITUS WITHOUT COMPLICATION, WITHOUT LONG-TERM CURRENT USE OF INSULIN (HCC): ICD-10-CM

## 2024-01-04 DIAGNOSIS — E89.0 HYPOTHYROIDISM, POSTSURGICAL: ICD-10-CM

## 2024-01-11 ENCOUNTER — OFFICE VISIT (OUTPATIENT)
Dept: ENDOCRINOLOGY | Facility: MEDICAL CENTER | Age: 44
End: 2024-01-11
Attending: INTERNAL MEDICINE
Payer: COMMERCIAL

## 2024-01-11 VITALS
HEART RATE: 109 BPM | HEIGHT: 63 IN | RESPIRATION RATE: 17 BRPM | DIASTOLIC BLOOD PRESSURE: 62 MMHG | SYSTOLIC BLOOD PRESSURE: 104 MMHG | BODY MASS INDEX: 28.51 KG/M2 | OXYGEN SATURATION: 99 % | WEIGHT: 160.9 LBS

## 2024-01-11 DIAGNOSIS — E11.9 CONTROLLED TYPE 2 DIABETES MELLITUS WITHOUT COMPLICATION, WITHOUT LONG-TERM CURRENT USE OF INSULIN (HCC): ICD-10-CM

## 2024-01-11 DIAGNOSIS — E78.5 DYSLIPIDEMIA: ICD-10-CM

## 2024-01-11 DIAGNOSIS — E55.9 VITAMIN D DEFICIENCY: ICD-10-CM

## 2024-01-11 DIAGNOSIS — E89.0 HYPOTHYROIDISM, POSTSURGICAL: ICD-10-CM

## 2024-01-11 LAB
HBA1C MFR BLD: 6.6 % (ref ?–5.8)
POCT INT CON NEG: NEGATIVE
POCT INT CON POS: POSITIVE

## 2024-01-11 PROCEDURE — 83036 HEMOGLOBIN GLYCOSYLATED A1C: CPT | Performed by: INTERNAL MEDICINE

## 2024-01-11 PROCEDURE — 99215 OFFICE O/P EST HI 40 MIN: CPT | Performed by: INTERNAL MEDICINE

## 2024-01-11 PROCEDURE — 99417 PROLNG OP E/M EACH 15 MIN: CPT | Performed by: INTERNAL MEDICINE

## 2024-01-11 PROCEDURE — 3074F SYST BP LT 130 MM HG: CPT | Performed by: INTERNAL MEDICINE

## 2024-01-11 PROCEDURE — 99213 OFFICE O/P EST LOW 20 MIN: CPT | Performed by: INTERNAL MEDICINE

## 2024-01-11 PROCEDURE — 3078F DIAST BP <80 MM HG: CPT | Performed by: INTERNAL MEDICINE

## 2024-01-11 RX ORDER — FERROUS SULFATE 325(65) MG
325 TABLET ORAL DAILY
COMMUNITY

## 2024-01-11 NOTE — PROGRESS NOTES
"RN-CDE Note    Subjective:   Endocrinology Clinic Progress Note  PCP: Pcp Pt States None    HPI:  Gala Camarena is a 43 y.o. old patient who is seen today by the Diabetes Nurse Specialist for review of her controlled type 2 diabetes and hypothyroid.      Recent changes in health: none  DM:   Last A1c:   Lab Results   Component Value Date/Time    HBA1C 6.6 (A) 01/11/2024 03:52 PM      Previous A1c was 6.8 on 1/6/23  A1C GOAL: < 7    Diabetes Medications:   none      Exercise: sporadic irregular exercise, <half hour walking weekly  (just started a new job 2  months ago and is now sedentary most of the time)   Diet: \"unhealthy\" diet in general  Patient's body mass index is 28.5 kg/m². Exercise and nutrition counseling were performed at this visit.    Glucose monitoring frequency: not testing      Lab Results   Component Value Date/Time    MALBCRT see below 01/05/2023 08:18 AM    MICROALBUR <1.2 01/05/2023 08:18 AM        ACR Albumin/Creatinine Ratio goal <30     HTN:   Blood pressure goal <130/<80 .   Currently Rx ACE/ARB: Not Indicated     Dyslipidemia:    Lab Results   Component Value Date/Time    CHOLSTRLTOT 155 01/05/2023 08:18 AM    LDL 88 01/05/2023 08:18 AM    HDL 56 01/05/2023 08:18 AM    TRIGLYCERIDE 57 01/05/2023 08:18 AM         Currently Rx Statin: Yes     She  reports that she has been smoking cigarettes. She has never used smokeless tobacco.      Plan:     Discussed and educated on:   - All medications, side effects and compliance (discussed carefully)  - HbA1C: target  - Home glucose monitoring emphasized  - Weight control and daily exercise    Recommended medication changes: no changes    "

## 2024-01-12 NOTE — PROGRESS NOTES
Chief Complaint: Follow up for postsurgical hypothyroidism and diet-controlled type 2 diabetes    HPI:     Gala Camarena is a 43 y.o. female here for follow up of of the above medical issues      She underwent a total thyroidectomy in 2018 at [facility] with benign findings.  She also has type 2 diabetes diagnosed in 2015 but it is diet controlled.    She is not on Metformin or other oral hypoglycemic agents which is her personal preference  She does have a history of ulcerative colitis with previous total colectomy and history of chronic back pain (CLBP) secondary to degenerative disc disease and multiple herniated disc.   She controls her CLBP with over-the-counter Advil.  Social history wise she works for the [employer]        She is on Levothyroxine 137 MCG daily   She has been on this medication for the past 2 years.    She reports good compliance and denies missing any daily doses.  She denies taking any iron, calcium, and antacids.    Her weight is stable.  She denies constipation cold intolerance  She denies tremors and palpitations  Her TSH is normal 1.58 on January 5, 2023    She was unable to get labs done prior to this visit  She reports that she was unable to get labs done because of changes with her insurance      With respect to her diet controlled type 2 diabetes.    Her A1c today is 6.6% on January 11, 2024  Her [id number]% on January 6, 2023  Her A1c was 6.2% on March 25, 2022.  She admits that she has been eating more baked goods over the holidays  She denies any diabetes related vascular complications such as neuropathy, retinopathy, and nephropathy      She was unable to get labs done prior to this visit to check her CMP lipids and urine albumin    She is currently not on a statin for prior prevention  We talked about initiating atorvastatin for primary prevention  Her LDL cholesterol was 88 on January 5, 2023  She does not have albuminuria  Her U ACR was less than 30 on January 5, 2023    We  are getting another point-of-care eye exam today  Foot exam was completed today          Patient's medications, allergies, and social histories were reviewed and updated as appropriate.      ROS:     CONS:     No fever, no chills   EYES:     No diplopia, no blurry vision   CV:           No chest pain, no palpitations   PULM:     No SOB, no cough, no hemoptysis.   GI:            No nausea, no vomiting, no diarrhea, no constipation   ENDO:     No polyuria, no polydipsia, no heat intolerance, no cold intolerance       Past Medical History:  Problem List:  2023-01: Dyslipidemia  2019-09: Adrenal insufficiency ([facility])  2019-08: Sciatica  2019-01: Hypothyroidism, postsurgical  2017-07: Multiple thyroid nodules  2016-06: IUD (intrauterine device) in place-mirena 8/2013 2015-12: Acne vulgaris  2015-12: Controlled type 2 diabetes mellitus without complication,   without long-term current use of insulin ([facility])  2015-06: Type II diabetes mellitus ([facility])  2015-05: Attention to ileostomy (CMS-HCC)  2015-05: Ulcerative colitis (CMS-HCC)  2015-03: Chronic abdominal pain  2014-11: Thyrotoxicosis  2014-11: Headache, classical migraine  2014-08: Adrenal insufficiency ([facility])  2014-08: 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;  Service: General    THYROIDECTOMY TOTAL  2018    Thyrotoxicosis and benign nodules    TRANSANAL PARTIAL PROCTECTOMY  5/20/2015    Procedure: TRANSANAL PARTIAL PROCTECTOMY Ileoanal J Pouch;  Surgeon: Smith Granado M.D.;  Location: SURGERY [facility];  Service:     LOW ANTERIOR RESECTION  5/20/2015    Procedure: LOW ANTERIOR  "RESECTION;  Surgeon: Smith Granado M.D.;  Location: SURGERY [facility];  Service:     EGD WITH ASP/BX  8/14     [place]    EXPLORATORY LAPAROTOMY  5/19/2014    Performed by Smith Granado M.D. at SURGERY [facility]    ILEO LOOP DIVERSION  5/19/2014    Performed by Smith Granado M.D. at SURGERY [facility]    GASTROSCOPY-ENDO  5/14/2014    Performed by Celio Hill M.D. at ENDOSCOPY [facility]    SIGMOIDOSCOPY FLEX  4/6/2014    Performed by Mauro Mercedes Jr., M.D. at ENDOSCOPY [facility]    COLECTOMY  3/19/2014    Performed by Smith Granado M.D. at SURGERY [facility]    ILEOSTOMY  3/19/2014    Performed by Smith Granado M.D. at SURGERY [facility]    COLONOSCOPY - ENDO  3/4/2014    Performed by Hernan Luna M.D. at SURGERY [facility]    CHOLECYSTECTOMY  2014    OVARIAN CYSTECTOMY      2007        Allergies:  Ativan, Compazine, Prochlorperazine, and Tape     Social History:  Social History     Tobacco Use    Smoking status: Every Day     Current packs/day: 0.00     Types: Cigarettes    Smokeless tobacco: Never   Vaping Use    Vaping Use: Never used   Substance Use Topics    Alcohol use: No     Comment: *social smoking     Drug use: Yes     Types: Marijuana        Family History:   family history includes Cancer in her maternal grandmother; Diabetes in her mother; Heart Disease in her maternal grandfather; Hypertension in her maternal aunt and mother.      PHYSICAL EXAM:   Vital signs: /62 (BP Location: Left arm, Patient Position: Sitting)   Pulse (!) 109   Resp 17   Ht 1.6 m (5' 3\")   Wt 73 kg (160 lb 14.4 oz)   SpO2 99%   BMI 28.50 kg/m²   GENERAL: Well-developed, well-nourished in no apparent distress.   EYE:  No ocular asymmetry, PERRLA  HENT: Pink, moist mucous membranes.    NECK: No thyromegaly.   CARDIOVASCULAR:  No murmurs  LUNGS: Clear breath sounds  ABDOMEN: Soft, nontender   EXTREMITIES: No clubbing, cyanosis, or edema.   NEUROLOGICAL: No gross focal " motor abnormalities   LYMPH: No cervical adenopathy palpated.   SKIN: No rashes, lesions.       Labs:  Lab Results   Component Value Date/Time    SODIUM 138 01/05/2023 08:18 AM    POTASSIUM 3.9 01/05/2023 08:18 AM    CHLORIDE 104 01/05/2023 08:18 AM    CO2 22 01/05/2023 08:18 AM    ANION 12.0 01/05/2023 08:18 AM    GLUCOSE 105 (H) 01/05/2023 08:18 AM    BUN 12 01/05/2023 08:18 AM    CREATININE 0.91 01/05/2023 08:18 AM    CALCIUM 9.4 01/05/2023 08:18 AM    ASTSGOT 14 01/05/2023 08:18 AM    ALTSGPT 10 01/05/2023 08:18 AM    TBILIRUBIN 0.4 01/05/2023 08:18 AM    ALBUMIN 4.4 01/05/2023 08:18 AM    ALBUMIN 3.28 (L) 08/18/2014 12:04 PM    TOTPROTEIN 7.4 01/05/2023 08:18 AM    TOTPROTEIN 7.20 08/18/2014 12:04 PM    GLOBULIN 3.0 01/05/2023 08:18 AM    AGRATIO 1.5 01/05/2023 08:18 AM       Lab Results   Component Value Date/Time    SODIUM 138 11/20/2021 0755    POTASSIUM 4.6 11/20/2021 0755    CHLORIDE 106 11/20/2021 0755    CO2 24 11/20/2021 0755    GLUCOSE 118 (H) 11/20/2021 0755    BUN 15 11/20/2021 0755    CREATININE 0.93 11/20/2021 0755    CALCIUM 9.8 11/20/2021 0755    ANION 8.0 11/20/2021 0755       Lab Results   Component Value Date/Time    CHOLSTRLTOT 158 02/13/2016 1002    TRIGLYCERIDE 54 02/13/2016 1002    HDL 66 02/13/2016 1002    LDL 81 02/13/2016 1002       Lab Results   Component Value Date/Time    TSHULTRASEN 2.950 11/20/2021 0755     Lab Results   Component Value Date/Time    FREET4 1.53 11/20/2021 0755     Lab Results   Component Value Date/Time    FREET3 4.21 (H) 07/31/2017 0648     Lab Results   Component Value Date/Time    THYSTIMIG 92 03/28/2016 1040       Lab Results   Component Value Date/Time    MICROSOMALA 0.8 06/26/2017 0742         Imaging:      ASSESSMENT/PLAN:     1. Controlled type 2 diabetes mellitus without complication, without long-term current use of insulin (HCC)  Stable   she is diet controlled  She prefers not to take oral agents which is reasonable  Continue diet and exercise   I want  her to get updated labs for CMP, lipids, urine albumin, and  TSH this week  Foot exam was completed today  Plan of care eye exam was completed today  I will see her again in 6 months        2. Hypothyroidism, postsurgical  Controlled  Continue levothyroxine 137 MCG daily  I want her to get updated labs for her thyroid this week  I will see her again in 6 months    3. Dyslipidemia  Stable  She is not taking atorvastatin at this time  She prefers not to take statins  Repeat fasting lipids this week    5. Vitamin D deficiency  Stable   I want her to get updated 25-hydroxy vitamin D labs this week      Return in about 7 months (around 8/11/2024).      Total time spent on day of service was over 60 minutes which included obtaining a detailed history and physical exam, ordering labs, coordinating care and scheduling future follow-up    Thank you kindly for allowing me to participate in the thyroid care plan for this patient.    Andriy James MD, FACE, ECNU      CC:   Pcp Pt States None

## 2024-02-02 ENCOUNTER — OFFICE VISIT (OUTPATIENT)
Dept: URGENT CARE | Facility: PHYSICIAN GROUP | Age: 44
End: 2024-02-02
Payer: COMMERCIAL

## 2024-02-02 ENCOUNTER — HOSPITAL ENCOUNTER (OUTPATIENT)
Facility: MEDICAL CENTER | Age: 44
End: 2024-02-02
Attending: PHYSICIAN ASSISTANT
Payer: COMMERCIAL

## 2024-02-02 ENCOUNTER — HOSPITAL ENCOUNTER (OUTPATIENT)
Dept: LAB | Facility: MEDICAL CENTER | Age: 44
End: 2024-02-02
Attending: INTERNAL MEDICINE
Payer: COMMERCIAL

## 2024-02-02 VITALS
BODY MASS INDEX: 27.46 KG/M2 | OXYGEN SATURATION: 99 % | HEART RATE: 116 BPM | HEIGHT: 63 IN | RESPIRATION RATE: 20 BRPM | TEMPERATURE: 97.5 F | WEIGHT: 155 LBS | SYSTOLIC BLOOD PRESSURE: 118 MMHG | DIASTOLIC BLOOD PRESSURE: 72 MMHG

## 2024-02-02 DIAGNOSIS — J03.90 EXUDATIVE TONSILLITIS: ICD-10-CM

## 2024-02-02 DIAGNOSIS — R00.0 TACHYCARDIA: ICD-10-CM

## 2024-02-02 DIAGNOSIS — E78.5 DYSLIPIDEMIA: ICD-10-CM

## 2024-02-02 DIAGNOSIS — E89.0 HYPOTHYROIDISM, POSTSURGICAL: ICD-10-CM

## 2024-02-02 DIAGNOSIS — E55.9 VITAMIN D DEFICIENCY: ICD-10-CM

## 2024-02-02 DIAGNOSIS — E11.9 CONTROLLED TYPE 2 DIABETES MELLITUS WITHOUT COMPLICATION, WITHOUT LONG-TERM CURRENT USE OF INSULIN (HCC): ICD-10-CM

## 2024-02-02 LAB
25(OH)D3 SERPL-MCNC: 28 NG/ML (ref 30–100)
ALBUMIN SERPL BCP-MCNC: 4.3 G/DL (ref 3.2–4.9)
ALBUMIN/GLOB SERPL: 1.4 G/DL
ALP SERPL-CCNC: 86 U/L (ref 30–99)
ALT SERPL-CCNC: 42 U/L (ref 2–50)
ANION GAP SERPL CALC-SCNC: 16 MMOL/L (ref 7–16)
AST SERPL-CCNC: 24 U/L (ref 12–45)
BILIRUB SERPL-MCNC: 0.3 MG/DL (ref 0.1–1.5)
BUN SERPL-MCNC: 12 MG/DL (ref 8–22)
CALCIUM ALBUM COR SERPL-MCNC: 9.1 MG/DL (ref 8.5–10.5)
CALCIUM SERPL-MCNC: 9.3 MG/DL (ref 8.5–10.5)
CHLORIDE SERPL-SCNC: 105 MMOL/L (ref 96–112)
CHOLEST SERPL-MCNC: 131 MG/DL (ref 100–199)
CO2 SERPL-SCNC: 17 MMOL/L (ref 20–33)
CREAT SERPL-MCNC: 0.76 MG/DL (ref 0.5–1.4)
CREAT UR-MCNC: 788.61 MG/DL
FASTING STATUS PATIENT QL REPORTED: NORMAL
GFR SERPLBLD CREATININE-BSD FMLA CKD-EPI: 99 ML/MIN/1.73 M 2
GLOBULIN SER CALC-MCNC: 3.1 G/DL (ref 1.9–3.5)
GLUCOSE SERPL-MCNC: 109 MG/DL (ref 65–99)
HDLC SERPL-MCNC: 48 MG/DL
LDLC SERPL CALC-MCNC: 71 MG/DL
MICROALBUMIN UR-MCNC: 13.6 MG/DL
MICROALBUMIN/CREAT UR: 17 MG/G (ref 0–30)
POTASSIUM SERPL-SCNC: 3.6 MMOL/L (ref 3.6–5.5)
PROT SERPL-MCNC: 7.4 G/DL (ref 6–8.2)
S PYO DNA SPEC NAA+PROBE: NOT DETECTED
SODIUM SERPL-SCNC: 138 MMOL/L (ref 135–145)
T4 FREE SERPL-MCNC: 1.67 NG/DL (ref 0.93–1.7)
TRIGL SERPL-MCNC: 59 MG/DL (ref 0–149)
TSH SERPL DL<=0.005 MIU/L-ACNC: 1.17 UIU/ML (ref 0.38–5.33)

## 2024-02-02 PROCEDURE — 87651 STREP A DNA AMP PROBE: CPT | Performed by: PHYSICIAN ASSISTANT

## 2024-02-02 PROCEDURE — 3074F SYST BP LT 130 MM HG: CPT | Performed by: PHYSICIAN ASSISTANT

## 2024-02-02 PROCEDURE — 87070 CULTURE OTHR SPECIMN AEROBIC: CPT

## 2024-02-02 PROCEDURE — 80053 COMPREHEN METABOLIC PANEL: CPT

## 2024-02-02 PROCEDURE — 3078F DIAST BP <80 MM HG: CPT | Performed by: PHYSICIAN ASSISTANT

## 2024-02-02 PROCEDURE — 84443 ASSAY THYROID STIM HORMONE: CPT

## 2024-02-02 PROCEDURE — 99213 OFFICE O/P EST LOW 20 MIN: CPT | Performed by: PHYSICIAN ASSISTANT

## 2024-02-02 PROCEDURE — 82043 UR ALBUMIN QUANTITATIVE: CPT

## 2024-02-02 PROCEDURE — 82570 ASSAY OF URINE CREATININE: CPT

## 2024-02-02 PROCEDURE — 84439 ASSAY OF FREE THYROXINE: CPT

## 2024-02-02 PROCEDURE — 87077 CULTURE AEROBIC IDENTIFY: CPT

## 2024-02-02 PROCEDURE — 36415 COLL VENOUS BLD VENIPUNCTURE: CPT

## 2024-02-02 PROCEDURE — 82306 VITAMIN D 25 HYDROXY: CPT

## 2024-02-02 PROCEDURE — 80061 LIPID PANEL: CPT

## 2024-02-02 RX ORDER — LIDOCAINE HYDROCHLORIDE 20 MG/ML
5-15 SOLUTION OROPHARYNGEAL EVERY 4 HOURS PRN
Qty: 100 ML | Refills: 1 | Status: SHIPPED | OUTPATIENT
Start: 2024-02-02

## 2024-02-02 RX ORDER — DEXAMETHASONE SODIUM PHOSPHATE 10 MG/ML
10 INJECTION INTRAMUSCULAR; INTRAVENOUS ONCE
Status: COMPLETED | OUTPATIENT
Start: 2024-02-02 | End: 2024-02-02

## 2024-02-02 RX ADMIN — DEXAMETHASONE SODIUM PHOSPHATE 10 MG: 10 INJECTION INTRAMUSCULAR; INTRAVENOUS at 12:00

## 2024-02-02 ASSESSMENT — ENCOUNTER SYMPTOMS
SWOLLEN GLANDS: 1
COUGH: 0
CHILLS: 1
NAUSEA: 0
TROUBLE SWALLOWING: 1
STRIDOR: 0
SHORTNESS OF BREATH: 0
ABDOMINAL PAIN: 0
DIARRHEA: 0
VOMITING: 0
SORE THROAT: 1
FEVER: 1
MYALGIAS: 1
HEADACHES: 1

## 2024-02-02 NOTE — PROGRESS NOTES
Subjective     Gala Camarena is a 43 y.o. female who presents with Sore Throat (X3days hard to swallow, fever)      Pharyngitis   This is a new problem. The current episode started in the past 7 days (started 2 days ago). The problem has been gradually worsening. The pain is worse on the right side. Maximum temperature: subjective fever. The pain is moderate. Associated symptoms include congestion, ear pain, headaches, a plugged ear sensation, swollen glands and trouble swallowing. Pertinent negatives include no abdominal pain, coughing, diarrhea, shortness of breath, stridor or vomiting. She has had no exposure to strep or mono. She has tried NSAIDs and acetaminophen (Last dose of acetaminophen was around 9 AM today) for the symptoms. The treatment provided mild relief.       Review of Systems   Constitutional:  Positive for chills and fever (subjective).   HENT:  Positive for congestion, ear pain, sore throat and trouble swallowing.    Respiratory:  Negative for cough, shortness of breath and stridor.    Gastrointestinal:  Negative for abdominal pain, diarrhea, nausea and vomiting.   Musculoskeletal:  Positive for myalgias.   Neurological:  Positive for headaches.             PMH:  has a past medical history of Adrenal insufficiency ([facility]) (2014), Anxiety, Blood transfusion, without reported diagnosis (8/2013), Chronic erosive gastritis (8/14), Depression, Dyslipidemia (1/6/2023), GERD (gastroesophageal reflux disease), Headache, classical migraine (11/2014), Heart burn, Hypercalcemia (2014), Hypothyroidism, postsurgical, IgA gammopathy (2014), Indigestion, Kidney disease, Mixed anxiety and depressive disorder (2014), Multiple thyroid nodules (7/31/2017), Opioid dependence ([facility]) (2014), Ovarian cyst, Pain (05-08-15), Pancreatitis (2014), S/P total colectomy (2014), Sciatica (8/26/2019), Thyrotoxicosis (2015), Type II or unspecified type diabetes mellitus without mention of complication, not stated as  "uncontrolled (2/2014), and Ulcerative colitis, chronic ([facility]).    She has no past medical history of Clotting disorder ([facility]), COPD (chronic obstructive pulmonary disease) ([facility]), Meningitis, Osteoporosis, Pituitary disease ([facility]), or Substance abuse ([facility]).  MEDS:   Current Outpatient Medications:     lidocaine (XYLOCAINE) 2 % Solution, Take 5-15 mL by mouth every four hours as needed for Throat/Mouth Pain., Disp: 100 mL, Rfl: 1    ferrous sulfate 325 (65 Fe) MG tablet, Take 325 mg by mouth every day., Disp: , Rfl:     Cholecalciferol (VITAMIN D) 2000 UNIT Tab, Take  by mouth every day., Disp: , Rfl:     levothyroxine (SYNTHROID) 137 MCG Tab, Take 1 Tablet by mouth every morning on an empty stomach., Disp: 90 Tablet, Rfl: 3    therapeutic multivitamin-minerals (THERAGRAN-M) Tab, Take 1 tablet by mouth every day., Disp: , Rfl:     Calcium Carb-Cholecalciferol (OYSTER SHELL CALCIUM/VITAMIN D) 250-125 MG-UNIT Tab tablet, Take 2 Tabs by mouth 3 times a day., Disp: 60 Tab, Rfl: 0    Ibuprofen 200 MG Cap, Take 4 Caps by mouth as needed. Indications: Mild to Moderate Pain, Disp: , Rfl:     SUMAtriptan (IMITREX) 50 MG Tab, TAKE ONE TABLET BY MOUTH ONE TIME AS NEEDED FOR MIGRAINE; MAY REPEAT ONE TABLET IN 2 HOURS IF HEADACHE PERSISTS. MAX OF 2 TABLETS A DAY (Patient not taking: Reported on 2/2/2024), Disp: 9 Tab, Rfl: 11  ALLERGIES:   Allergies   Allergen Reactions    Ativan      Hallucinations, restless    Compazine Palpitations     Hypertension    Prochlorperazine Palpitations     \"Compazine\"; severe HTN    Tape Rash     Paper tape and tegaderm is okay.     SURGHX:   Past Surgical History:   Procedure Laterality Date    THYROIDECTOMY TOTAL  10/31/2018    Procedure: THYROIDECTOMY TOTAL- OR NEAR TOTAL;  Surgeon: Joshua Douglass M.D.;  Location: SURGERY SAME DAY [facility];  Service: General    THYROIDECTOMY TOTAL  2018    Thyrotoxicosis and benign nodules    TRANSANAL PARTIAL PROCTECTOMY  5/20/2015    Procedure: TRANSANAL " "PARTIAL PROCTECTOMY Ileoanal J Pouch;  Surgeon: Smith Granado M.D.;  Location: SURGERY [facility];  Service:     LOW ANTERIOR RESECTION  5/20/2015    Procedure: LOW ANTERIOR RESECTION;  Surgeon: Smith Granado M.D.;  Location: SURGERY [facility];  Service:     EGD WITH ASP/BX  8/14     Daren    EXPLORATORY LAPAROTOMY  5/19/2014    Performed by Smith Granado M.D. at SURGERY [facility]    ILEO LOOP DIVERSION  5/19/2014    Performed by Smith Granado M.D. at SURGERY [facility]    GASTROSCOPY-ENDO  5/14/2014    Performed by Celio Hill M.D. at ENDOSCOPY [facility]    SIGMOIDOSCOPY FLEX  4/6/2014    Performed by Mauro Mercedes Jr., M.D. at ENDOSCOPY [facility]    COLECTOMY  3/19/2014    Performed by Smith Granado M.D. at SURGERY [facility]    ILEOSTOMY  3/19/2014    Performed by Smith Granado M.D. at SURGERY [facility]    COLONOSCOPY - ENDO  3/4/2014    Performed by Hernan Luna M.D. at SURGERY [facility]    CHOLECYSTECTOMY  2014    OVARIAN CYSTECTOMY      2007     SOCHX:  reports that she has been smoking cigarettes. She has never used smokeless tobacco. She reports that she does not currently use drugs after having used the following drugs: Marijuana. She reports that she does not drink alcohol.  FH: Family history was reviewed, no pertinent findings to report      Objective     /72   Pulse (!) 116   Temp 36.4 °C (97.5 °F) (Temporal)   Resp 20   Ht 1.6 m (5' 3\")   Wt 70.3 kg (155 lb)   SpO2 99%   BMI 27.46 kg/m²      Physical Exam  Constitutional:       Appearance: She is well-developed.   HENT:      Head: Normocephalic and atraumatic.      Right Ear: Tympanic membrane, ear canal and external ear normal.      Left Ear: Tympanic membrane, ear canal and external ear normal.      Nose: Mucosal edema present. No congestion or rhinorrhea.      Mouth/Throat:      Lips: Pink.      Mouth: Mucous membranes are moist.      Pharynx: Uvula midline. Oropharyngeal " exudate and posterior oropharyngeal erythema present. No uvula swelling.      Tonsils: Tonsillar exudate present. No tonsillar abscesses. 2+ on the right. 1+ on the left.   Eyes:      Conjunctiva/sclera: Conjunctivae normal.      Pupils: Pupils are equal, round, and reactive to light.   Cardiovascular:      Rate and Rhythm: Regular rhythm. Tachycardia present.      Heart sounds: Normal heart sounds. No murmur heard.  Pulmonary:      Effort: Pulmonary effort is normal.      Breath sounds: Normal breath sounds. No wheezing.   Musculoskeletal:      Cervical back: Normal range of motion.   Lymphadenopathy:      Cervical: Cervical adenopathy present.      Right cervical: Superficial cervical adenopathy present.      Left cervical: Superficial cervical adenopathy present.   Skin:     General: Skin is warm and dry.      Capillary Refill: Capillary refill takes less than 2 seconds.   Neurological:      Mental Status: She is alert and oriented to person, place, and time.   Psychiatric:         Behavior: Behavior normal.         Judgment: Judgment normal.                   POCT GROUP A STREP, PCR - Negative      Assessment & Plan     1. Exudative tonsillitis  - dexamethasone (Decadron) injection (check route below) 10 mg  - lidocaine (XYLOCAINE) 2 % Solution; Take 5-15 mL by mouth every four hours as needed for Throat/Mouth Pain.  Dispense: 100 mL; Refill: 1  - POCT GROUP A STREP, PCR  - CULTURE THROAT; Future  PCR strep test negative.  Will obtain a throat culture to further rule out bacterial cause.  For now we will assume symptoms are viral in etiology.  Recommend symptomatic and supportive care.  She may use OTC analgesics.  Can also do salt water gargles, throat lozenges, drink tea.    2. Tachycardia  - Likely secondary to some dehydration.  Recommend that she increase her fluid intake.              Differential Diagnosis, natural history, and supportive care discussed. Return to the Urgent Care or follow up with your  PCP if symptoms fail to resolve, or for any new or worsening symptoms. Emergency room precautions discussed. Patient and/or family appears understanding of information.

## 2024-02-04 LAB
BACTERIA SPEC RESP CULT: NORMAL
SIGNIFICANT IND 70042: NORMAL
SITE SITE: NORMAL
SOURCE SOURCE: NORMAL

## 2024-03-28 DIAGNOSIS — E89.0 HYPOTHYROIDISM, POSTSURGICAL: ICD-10-CM

## 2024-03-28 RX ORDER — LEVOTHYROXINE SODIUM 137 UG/1
137 TABLET ORAL
Qty: 90 TABLET | Refills: 3 | Status: SHIPPED | OUTPATIENT
Start: 2024-03-28

## 2024-08-15 ENCOUNTER — OFFICE VISIT (OUTPATIENT)
Dept: ENDOCRINOLOGY | Facility: MEDICAL CENTER | Age: 44
End: 2024-08-15
Attending: INTERNAL MEDICINE
Payer: COMMERCIAL

## 2024-08-15 VITALS
HEIGHT: 63 IN | WEIGHT: 172.3 LBS | SYSTOLIC BLOOD PRESSURE: 112 MMHG | HEART RATE: 107 BPM | OXYGEN SATURATION: 99 % | DIASTOLIC BLOOD PRESSURE: 68 MMHG | BODY MASS INDEX: 30.53 KG/M2

## 2024-08-15 DIAGNOSIS — E78.5 DYSLIPIDEMIA: ICD-10-CM

## 2024-08-15 DIAGNOSIS — E89.0 HYPOTHYROIDISM, POSTSURGICAL: ICD-10-CM

## 2024-08-15 DIAGNOSIS — E11.9 CONTROLLED TYPE 2 DIABETES MELLITUS WITHOUT COMPLICATION, WITHOUT LONG-TERM CURRENT USE OF INSULIN (HCC): ICD-10-CM

## 2024-08-15 DIAGNOSIS — E55.9 VITAMIN D DEFICIENCY: ICD-10-CM

## 2024-08-15 LAB
HBA1C MFR BLD: 6.4 % (ref ?–5.8)
POCT INT CON NEG: NEGATIVE
POCT INT CON POS: POSITIVE
RETINAL SCREEN: NORMAL

## 2024-08-15 PROCEDURE — 3078F DIAST BP <80 MM HG: CPT | Performed by: INTERNAL MEDICINE

## 2024-08-15 PROCEDURE — 3074F SYST BP LT 130 MM HG: CPT | Performed by: INTERNAL MEDICINE

## 2024-08-15 PROCEDURE — 83036 HEMOGLOBIN GLYCOSYLATED A1C: CPT | Performed by: INTERNAL MEDICINE

## 2024-08-15 PROCEDURE — 99214 OFFICE O/P EST MOD 30 MIN: CPT | Performed by: INTERNAL MEDICINE

## 2024-08-15 PROCEDURE — 99212 OFFICE O/P EST SF 10 MIN: CPT | Performed by: INTERNAL MEDICINE

## 2024-08-15 NOTE — PROGRESS NOTES
Chief Complaint: Follow up for postsurgical hypothyroidism and diet-controlled type 2 diabetes    HPI:     Gala Camarena is a 44 y.o. female here for follow up of of the above medical issues      She underwent a total thyroidectomy in 2018 at [facility] with benign findings.  She also has type 2 diabetes diagnosed in 2015 but it is diet controlled.    She is not on Metformin or other oral hypoglycemic agents which is her personal preference  She does have a history of ulcerative colitis with previous total colectomy and history of chronic back pain (CLBP) secondary to degenerative disc disease and multiple herniated disc.   She controls her CLBP with over-the-counter Advil.  Social history wise she works for the [employer]        She is on Levothyroxine 137 MCG daily   She has been on this medication for the past 3 years.    She reports good compliance and denies missing any daily doses.  She denies taking any iron, calcium, and antacids.    Her weight is stable.  She denies constipation cold intolerance  She denies tremors and palpitations  Her last TSH was normal at 6.4 in August 15, 2024    With respect to her diet controlled type 2 diabetes.    Her A1c today is 6.4% on August 15, 2024  Her A1c today is 6.6% on January 11, 2024  Her A1cwas 6.8% on January 6, 2023  Her A1c was 6.2% on March 25, 2022.  She prefers to manage her diabetes with diet and does not want to take metformin or GLP-1 analogs or SGLT2 inhibitors      She denies any diabetes related vascular complications such as neuropathy, retinopathy, and nephropathy        She is currently not on a statin for prior prevention  We talked about initiating atorvastatin for primary prevention  She declines statin therapy  Her LDL cholesterol was 71 on Feb 2024    She does not have albuminuria  Her UACR was less than 30 on February 2024    We are getting another point-of-care eye exam today    Foot exam was completed today          Patient's medications,  allergies, and social histories were reviewed and updated as appropriate.      ROS:     CONS:     No fever, no chills   EYES:     No diplopia, no blurry vision   CV:           No chest pain, no palpitations   PULM:     No SOB, no cough, no hemoptysis.   GI:            No nausea, no vomiting, no diarrhea, no constipation   ENDO:     No polyuria, no polydipsia, no heat intolerance, no cold intolerance       Past Medical History:  Problem List:  2023-01: Dyslipidemia  2019-09: Adrenal insufficiency ([facility])  2019-08: Sciatica  2019-01: Hypothyroidism, postsurgical  2017-07: Multiple thyroid nodules  2016-06: IUD (intrauterine device) in place-mirena 8/2013 2015-12: Acne vulgaris  2015-12: Controlled type 2 diabetes mellitus without complication,   without long-term current use of insulin ([facility])  2015-06: Type II diabetes mellitus ([facility])  2015-05: Attention to ileostomy (CMS-HCC)  2015-05: Ulcerative colitis (CMS-HCC)  2015-03: Chronic abdominal pain  2014-11: Thyrotoxicosis  2014-11: Headache, classical migraine  2014-08: Adrenal insufficiency ([facility])  2014-08: 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[facility])  2014-05: Ulcerative colitis ([facility])  2014-04: Opioid dependence (CMS-HCC)  2014-04: Mixed anxiety and depressive disorder  2014-04: S/P colectomy  IgA gammopathy      Past Surgical History:  Past Surgical History:   Procedure Laterality Date    THYROIDECTOMY TOTAL  10/31/2018    Procedure: THYROIDECTOMY TOTAL- OR NEAR TOTAL;  Surgeon: Joshua Douglass M.D.;  Location: SURGERY SAME DAY [facility];  Service: General    THYROIDECTOMY TOTAL  2018    Thyrotoxicosis and benign nodules    TRANSANAL PARTIAL PROCTECTOMY  5/20/2015    Procedure: TRANSANAL PARTIAL PROCTECTOMY Ileoanal J Pouch;  Surgeon: Smith Granado M.D.;  Location: SURGERY [facility];  Service:     LOW ANTERIOR RESECTION  5/20/2015    Procedure: LOW ANTERIOR RESECTION;  Surgeon: Smith Granado M.D.;  Location: SURGERY [facility];  Service:     EGD WITH ASP/BX  8/14   "   Daren    EXPLORATORY LAPAROTOMY  5/19/2014    Performed by Smith Granado M.D. at SURGERY [facility]    ILEO LOOP DIVERSION  5/19/2014    Performed by Smith Granado M.D. at SURGERY [facility]    GASTROSCOPY-ENDO  5/14/2014    Performed by Celio Hill M.D. at ENDOSCOPY [facility]    SIGMOIDOSCOPY FLEX  4/6/2014    Performed by Mauro Mercedes Jr., M.D. at ENDOSCOPY [facility]    COLECTOMY  3/19/2014    Performed by Smith Granado M.D. at SURGERY [facility]    ILEOSTOMY  3/19/2014    Performed by Smith Granado M.D. at SURGERY [facility]    COLONOSCOPY - ENDO  3/4/2014    Performed by Hernan Luna M.D. at SURGERY [facility]    CHOLECYSTECTOMY  2014    OVARIAN CYSTECTOMY      2007        Allergies:  Ativan, Compazine, Prochlorperazine, and Tape     Social History:  Social History     Tobacco Use    Smoking status: Every Day     Current packs/day: 0.00     Types: Cigarettes    Smokeless tobacco: Never   Vaping Use    Vaping status: Never Used   Substance Use Topics    Alcohol use: No     Comment: *social smoking     Drug use: Not Currently     Types: Marijuana        Family History:   family history includes Cancer in her maternal grandmother; Diabetes in her mother; Heart Disease in her maternal grandfather; Hypertension in her maternal aunt and mother.      PHYSICAL EXAM:   Vital signs: /68   Pulse (!) 107   Ht 1.6 m (5' 3\") Comment: pt stated  Wt 78.2 kg (172 lb 4.8 oz)   SpO2 99%   BMI 30.52 kg/m²   GENERAL: Well-developed, well-nourished in no apparent distress.   EYE:  No ocular asymmetry, PERRLA  HENT: Pink, moist mucous membranes.    NECK: No thyromegaly.   CARDIOVASCULAR:  No murmurs  LUNGS: Clear breath sounds  ABDOMEN: Soft, nontender   EXTREMITIES: No clubbing, cyanosis, or edema.   NEUROLOGICAL: No gross focal motor abnormalities   LYMPH: No cervical adenopathy palpated.   SKIN: No rashes, lesions.       Labs:  Lab Results   Component Value Date/Time    " SODIUM 138 02/02/2024 10:22 AM    POTASSIUM 3.6 02/02/2024 10:22 AM    CHLORIDE 105 02/02/2024 10:22 AM    CO2 17 (L) 02/02/2024 10:22 AM    ANION 16.0 02/02/2024 10:22 AM    GLUCOSE 109 (H) 02/02/2024 10:22 AM    BUN 12 02/02/2024 10:22 AM    CREATININE 0.76 02/02/2024 10:22 AM    CALCIUM 9.3 02/02/2024 10:22 AM    ASTSGOT 24 02/02/2024 10:22 AM    ALTSGPT 42 02/02/2024 10:22 AM    TBILIRUBIN 0.3 02/02/2024 10:22 AM    ALBUMIN 4.3 02/02/2024 10:22 AM    ALBUMIN 3.28 (L) 08/18/2014 12:04 PM    TOTPROTEIN 7.4 02/02/2024 10:22 AM    TOTPROTEIN 7.20 08/18/2014 12:04 PM    GLOBULIN 3.1 02/02/2024 10:22 AM    AGRATIO 1.4 02/02/2024 10:22 AM       Lab Results   Component Value Date/Time    SODIUM 138 11/20/2021 0755    POTASSIUM 4.6 11/20/2021 0755    CHLORIDE 106 11/20/2021 0755    CO2 24 11/20/2021 0755    GLUCOSE 118 (H) 11/20/2021 0755    BUN 15 11/20/2021 0755    CREATININE 0.93 11/20/2021 0755    CALCIUM 9.8 11/20/2021 0755    ANION 8.0 11/20/2021 0755       Lab Results   Component Value Date/Time    CHOLSTRLTOT 158 02/13/2016 1002    TRIGLYCERIDE 54 02/13/2016 1002    HDL 66 02/13/2016 1002    LDL 81 02/13/2016 1002       Lab Results   Component Value Date/Time    TSHULTRASEN 2.950 11/20/2021 0755     Lab Results   Component Value Date/Time    FREET4 1.53 11/20/2021 0755     Lab Results   Component Value Date/Time    FREET3 4.21 (H) 07/31/2017 0648     Lab Results   Component Value Date/Time    THYSTIMIG 92 03/28/2016 1040       Lab Results   Component Value Date/Time    MICROSOMALA 0.8 06/26/2017 0742         Imaging:      ASSESSMENT/PLAN:     1. Controlled type 2 diabetes mellitus without complication, without long-term current use of insulin (HCC)  Stable   she is diet controlled  She prefers not to take oral agents which is reasonable  Continue diet and exercise   Plan of care eye exam was completed today  I will see her again in 6 months with complete fasting labs    2. Hypothyroidism,  postsurgical  Controlled  Continue levothyroxine 137 MCG daily  I will see her again in 6 months with repeat thyroid labs    3. Dyslipidemia  Stable  She is not taking atorvastatin at this time  She prefers not to take statins  Repeat fasting lipids in 6 months    5. Vitamin D deficiency  Stable   Vitamin D labs were reviewed with patient  Continue current supplements  Continue monitoring levels       Return in about 6 months (around 2/15/2025).      Thank you kindly for allowing me to participate in the thyroid care plan for this patient.    Andriy James MD, FACE, ECNU      CC:   Pcp Pt States None

## 2025-02-02 DIAGNOSIS — E89.0 HYPOTHYROIDISM, POSTSURGICAL: ICD-10-CM

## 2025-02-03 RX ORDER — LEVOTHYROXINE SODIUM 137 UG/1
137 TABLET ORAL
Qty: 90 TABLET | Refills: 3 | Status: SHIPPED | OUTPATIENT
Start: 2025-02-03 | End: 2025-02-26 | Stop reason: SDUPTHER

## 2025-02-26 ENCOUNTER — OFFICE VISIT (OUTPATIENT)
Dept: ENDOCRINOLOGY | Facility: MEDICAL CENTER | Age: 45
End: 2025-02-26
Attending: INTERNAL MEDICINE
Payer: COMMERCIAL

## 2025-02-26 VITALS
SYSTOLIC BLOOD PRESSURE: 94 MMHG | BODY MASS INDEX: 31.15 KG/M2 | HEIGHT: 63 IN | WEIGHT: 175.8 LBS | DIASTOLIC BLOOD PRESSURE: 60 MMHG | OXYGEN SATURATION: 99 % | HEART RATE: 64 BPM

## 2025-02-26 DIAGNOSIS — E55.9 VITAMIN D DEFICIENCY: ICD-10-CM

## 2025-02-26 DIAGNOSIS — E89.0 HYPOTHYROIDISM, POSTSURGICAL: ICD-10-CM

## 2025-02-26 DIAGNOSIS — E11.9 CONTROLLED TYPE 2 DIABETES MELLITUS WITHOUT COMPLICATION, WITHOUT LONG-TERM CURRENT USE OF INSULIN (HCC): ICD-10-CM

## 2025-02-26 DIAGNOSIS — L70.0 ACNE VULGARIS: ICD-10-CM

## 2025-02-26 DIAGNOSIS — E78.5 DYSLIPIDEMIA: ICD-10-CM

## 2025-02-26 LAB
HBA1C MFR BLD: 6.3 % (ref ?–5.8)
POCT INT CON NEG: NEGATIVE
POCT INT CON POS: POSITIVE

## 2025-02-26 PROCEDURE — 99213 OFFICE O/P EST LOW 20 MIN: CPT | Performed by: INTERNAL MEDICINE

## 2025-02-26 PROCEDURE — 83036 HEMOGLOBIN GLYCOSYLATED A1C: CPT | Performed by: INTERNAL MEDICINE

## 2025-02-26 RX ORDER — MINOCYCLINE HYDROCHLORIDE 100 MG/1
100 CAPSULE ORAL 2 TIMES DAILY
Qty: 60 CAPSULE | Refills: 0 | Status: SHIPPED | OUTPATIENT
Start: 2025-02-26

## 2025-02-26 RX ORDER — LEVOTHYROXINE SODIUM 137 UG/1
137 TABLET ORAL
Qty: 90 TABLET | Refills: 3 | Status: SHIPPED | OUTPATIENT
Start: 2025-02-26

## 2025-02-26 NOTE — PROGRESS NOTES
"RN-CDE Note    Subjective:   Endocrinology Clinic Progress Note  PCP: Pcp Pt States None    HPI:  Gala Camarena is a 44 y.o. old patient who is seen today by the Diabetes Nurse Specialist for review of her pre diabetes (she has been over 6 months with A1c less than 6.5 without medication .   Recent changes in health: quit smoking  DM:   Last A1c:   Lab Results   Component Value Date/Time    HBA1C 6.3 (A) 02/26/2025 02:12 PM      Previous A1c was 6.4 on 8/15/2024  A1C GOAL: < 7    Diabetes Medications:   none         Exercise: strenuous regular exercise, aerobic > 3 hours a week  Diet: \"healthy\" diet  in general  Patient's body mass index is 31.14 kg/m². Exercise and nutrition counseling were performed at this visit.        Hypoglycemic episodes: no      Lab Results   Component Value Date/Time    MALBCRT 17 02/02/2024 10:22 AM    MICROALBUR 13.6 02/02/2024 10:22 AM        ACR Albumin/Creatinine Ratio goal <30     HTN:   Blood pressure goal <130/<80   Currently Rx ACE/ARB:  no    Dyslipidemia:    Lab Results   Component Value Date/Time    CHOLSTRLTOT 131 02/02/2024 10:22 AM    LDL 71 02/02/2024 10:22 AM    HDL 48 02/02/2024 10:22 AM    TRIGLYCERIDE 59 02/02/2024 10:22 AM         Currently Rx Statin:  no    She  reports that she quit smoking about 2 months ago. Her smoking use included cigarettes. She has never used smokeless tobacco.    Plan:     Discussed and educated on:   - All medications, side effects and compliance (discussed carefully)  - HbA1C: target  - Home glucose monitoring emphasized  - Weight control and daily exercise    Recommended medication changes: none     "

## 2025-02-26 NOTE — PROGRESS NOTES
Chief Complaint: Follow up for postsurgical hypothyroidism and diet-controlled type 2 diabetes    HPI:     Gala Camarena is a 44 y.o. female here for follow up of of the above medical issues      She underwent a total thyroidectomy in 2018 at [facility] by Dr Douglass with benign findings.    She also has type 2 diabetes diagnosed in 2015 but it is diet controlled and now diabetes is in remission as of 2025.    She is not on Metformin or other oral hypoglycemic agents which is her personal preference  She does have a history of ulcerative colitis with previous total colectomy and history of chronic back pain (CLBP) secondary to degenerative disc disease and multiple herniated disc.   She controls her CLBP with over-the-counter Advil and exercise  Social history wise she used to  work for the [employer]  She now works for [employer]          She is on Levothyroxine 137 MCG daily   She has been on this medication for the past 5 years.    She reports good compliance and denies missing any daily doses.  She denies taking any iron, calcium, and antacids.    Her weight is stable.  She denies constipation cold intolerance  She denies tremors and palpitations    We dont have updated labs   Her last TSH was normal at 1.17 on Feb 2024        With respect to her diet controlled type 2 diabetes she remains off meds and A1c is better  Basically her diabetes is In remission as she has off meds for over 12 mos and A1c is  is below 6.5%  Her A1c today is 6.3% on 2/26/2025  Her A1c today is 6.4% on August 15, 2024  Her A1c today is 6.6% on January 11, 2024  Her A1cwas 6.8% on January 6, 2023  Her A1c was 6.2% on March 25, 2022.  She prefers to manage her diabetes with diet and does not want to take metformin or GLP-1 analogs or SGLT2 inhibitors      She denies any diabetes related vascular complications such as neuropathy, retinopathy, and nephropathy      She was not able to get labs done         She is currently not on a  statin for prior prevention  We talked about initiating atorvastatin for primary prevention  She declines statin therapy  Her LDL cholesterol was 71 on Feb 2024    She does not have albuminuria  Her UACR was less than 30 on February 2024    We are getting another point-of-care eye exam today    Foot exam was completed today          Patient's medications, allergies, and social histories were reviewed and updated as appropriate.      ROS:     CONS:     No fever, no chills   EYES:     No diplopia, no blurry vision   CV:           No chest pain, no palpitations   PULM:     No SOB, no cough, no hemoptysis.   GI:            No nausea, no vomiting, no diarrhea, no constipation   ENDO:     No polyuria, no polydipsia, no heat intolerance, no cold intolerance       Past Medical History:  Problem List:  2023-01: Dyslipidemia  2019-09: Adrenal insufficiency ([facility])  2019-08: Sciatica  2019-01: Hypothyroidism, postsurgical  2017-07: Multiple thyroid nodules  2016-06: IUD (intrauterine device) in place-mirena 8/2013 2015-12: Acne vulgaris  2015-12: Controlled type 2 diabetes mellitus without complication,   without long-term current use of insulin (Former[facility])  2015-06: Type II diabetes mellitus ([facility])  2015-05: Attention to ileostomy (CMS-HCC)  2015-05: Ulcerative colitis (CMS-HCC)  2015-03: Chronic abdominal pain  2014-11: Thyrotoxicosis  2014-11: Headache, classical migraine  2014-08: Adrenal insufficiency ([facility])  2014-08: 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Formerly [facility])  2014-05: Ulcerative colitis ([facility])  2014-04: Opioid dependence (CMS-HCC)  2014-04: Mixed anxiety and depressive disorder  2014-04: S/P colectomy  IgA gammopathy      Past Surgical History:  Past Surgical History:   Procedure Laterality Date    THYROIDECTOMY TOTAL  10/31/2018    Procedure: THYROIDECTOMY TOTAL- OR NEAR TOTAL;  Surgeon: Joshua Douglass M.D.;  Location: SURGERY SAME DAY [facility];  Service: General    THYROIDECTOMY TOTAL  2018    Thyrotoxicosis and benign nodules     "TRANSANAL PARTIAL PROCTECTOMY  2015    Procedure: TRANSANAL PARTIAL PROCTECTOMY Ileoanal J Pouch;  Surgeon: Smith Granado M.D.;  Location: SURGERY [facility];  Service:     LOW ANTERIOR RESECTION  2015    Procedure: LOW ANTERIOR RESECTION;  Surgeon: Smith Granado M.D.;  Location: SURGERY [facility];  Service:     EGD WITH ASP/BX       Daren    EXPLORATORY LAPAROTOMY  2014    Performed by Smith Granado M.D. at SURGERY [facility]    ILEO LOOP DIVERSION  2014    Performed by Smith Granado M.D. at SURGERY [facility]    GASTROSCOPY-ENDO  2014    Performed by Celio Hill M.D. at ENDOSCOPY [facility] [place]    SIGMOIDOSCOPY FLEX  2014    Performed by Mauro Mercedes Jr., M.D. at ENDOSCOPY [facility] [place]    COLECTOMY  3/19/2014    Performed by Smith Granado M.D. at SURGERY [facility]    ILEOSTOMY  3/19/2014    Performed by Smith Granado M.D. at SURGERY [facility]    COLONOSCOPY - ENDO  3/4/2014    Performed by Hernan Luna M.D. at SURGERY [facility]    CHOLECYSTECTOMY  2014    OVARIAN CYSTECTOMY              Allergies:  Ativan, Compazine, Prochlorperazine, and Tape     Social History:  Social History     Tobacco Use    Smoking status: Former     Current packs/day: 0.00     Types: Cigarettes     Quit date: 2024     Years since quittin.2    Smokeless tobacco: Never   Vaping Use    Vaping status: Never Used   Substance Use Topics    Alcohol use: No     Comment: *social smoking     Drug use: Not Currently     Types: Marijuana        Family History:   family history includes Cancer in her maternal grandmother; Diabetes in her mother; Heart Disease in her maternal grandfather; Hypertension in her maternal aunt and mother.      PHYSICAL EXAM:   Vital signs: BP 94/60 (BP Location: Left arm, Patient Position: Sitting)   Pulse 64   Ht 1.6 m (5' 3\")   Wt 79.7 kg (175 lb 12.8 oz)   SpO2 99%   BMI 31.14 kg/m²   GENERAL: Well-developed, " well-nourished in no apparent distress.   EYE:  No ocular asymmetry, PERRLA  HENT: Pink, moist mucous membranes.    NECK: No thyromegaly.   CARDIOVASCULAR:  No murmurs  LUNGS: Clear breath sounds  ABDOMEN: Soft, nontender   EXTREMITIES: No clubbing, cyanosis, or edema.   NEUROLOGICAL: No gross focal motor abnormalities   LYMPH: No cervical adenopathy palpated.   SKIN: No rashes, lesions.       Labs:  Lab Results   Component Value Date/Time    SODIUM 138 02/02/2024 10:22 AM    POTASSIUM 3.6 02/02/2024 10:22 AM    CHLORIDE 105 02/02/2024 10:22 AM    CO2 17 (L) 02/02/2024 10:22 AM    ANION 16.0 02/02/2024 10:22 AM    GLUCOSE 109 (H) 02/02/2024 10:22 AM    BUN 12 02/02/2024 10:22 AM    CREATININE 0.76 02/02/2024 10:22 AM    CALCIUM 9.3 02/02/2024 10:22 AM    ASTSGOT 24 02/02/2024 10:22 AM    ALTSGPT 42 02/02/2024 10:22 AM    TBILIRUBIN 0.3 02/02/2024 10:22 AM    ALBUMIN 4.3 02/02/2024 10:22 AM    ALBUMIN 3.28 (L) 08/18/2014 12:04 PM    TOTPROTEIN 7.4 02/02/2024 10:22 AM    TOTPROTEIN 7.20 08/18/2014 12:04 PM    GLOBULIN 3.1 02/02/2024 10:22 AM    AGRATIO 1.4 02/02/2024 10:22 AM       Lab Results   Component Value Date/Time    SODIUM 138 11/20/2021 0755    POTASSIUM 4.6 11/20/2021 0755    CHLORIDE 106 11/20/2021 0755    CO2 24 11/20/2021 0755    GLUCOSE 118 (H) 11/20/2021 0755    BUN 15 11/20/2021 0755    CREATININE 0.93 11/20/2021 0755    CALCIUM 9.8 11/20/2021 0755    ANION 8.0 11/20/2021 0755       Lab Results   Component Value Date/Time    CHOLSTRLTOT 158 02/13/2016 1002    TRIGLYCERIDE 54 02/13/2016 1002    HDL 66 02/13/2016 1002    LDL 81 02/13/2016 1002       Lab Results   Component Value Date/Time    TSHULTRASEN 2.950 11/20/2021 0755     Lab Results   Component Value Date/Time    FREET4 1.53 11/20/2021 0755     Lab Results   Component Value Date/Time    FREET3 4.21 (H) 07/31/2017 0648     Lab Results   Component Value Date/Time    THYSTIMIG 92 03/28/2016 1040       Lab Results   Component Value Date/Time     MICROSOMALA 0.8 06/26/2017 0742         Imaging:      ASSESSMENT/PLAN:     1. Controlled type 2 diabetes mellitus without complication, without long-term current use of insulin (HCC)  Resolved diabetes is in remission as she is not on medications and A1c is below 6.5%  I am going to try to release her to her primary care  She stated that she established with a new primary care in [place] but she does not recall the details  I do want her to get updated labs for CMP and lipids and urine albumin    2. Hypothyroidism, postsurgical  Controlled  Continue levothyroxine 137 MCG daily  I refilled her medications for 1 year as she is going to establish with a new primary care  I do want her to get updated labs for TSH    3. Dyslipidemia  Stable  She is not taking atorvastatin at this time  She prefers not to take statins  Get updated fasting lipids    4. Vitamin D deficiency  I want her to get an updated vitamin D    5. Acne vulgaris  As a courtesy I am going to refill her acne medication for 1 time until she gets to see her new primary care in [place]        Return if symptoms worsen or fail to improve.      Thank you kindly for allowing me to participate in the thyroid care plan for this patient.    Andriy James MD, FACE, ECNU      CC:   Pcp Pt States None

## 2025-04-11 ENCOUNTER — APPOINTMENT (OUTPATIENT)
Dept: MEDICAL GROUP | Facility: CLINIC | Age: 45
End: 2025-04-11
Payer: COMMERCIAL

## 2025-04-11 VITALS
BODY MASS INDEX: 30.74 KG/M2 | HEIGHT: 63 IN | WEIGHT: 173.5 LBS | SYSTOLIC BLOOD PRESSURE: 130 MMHG | HEART RATE: 75 BPM | OXYGEN SATURATION: 98 % | DIASTOLIC BLOOD PRESSURE: 80 MMHG | TEMPERATURE: 99.1 F

## 2025-04-11 DIAGNOSIS — L70.0 ACNE VULGARIS: ICD-10-CM

## 2025-04-11 DIAGNOSIS — E66.9 OBESITY (BMI 30-39.9): ICD-10-CM

## 2025-04-11 DIAGNOSIS — K51.919 ULCERATIVE COLITIS WITH COMPLICATION, UNSPECIFIED LOCATION (HCC): ICD-10-CM

## 2025-04-11 DIAGNOSIS — Z12.31 ENCOUNTER FOR SCREENING MAMMOGRAM FOR BREAST CANCER: ICD-10-CM

## 2025-04-11 DIAGNOSIS — Z13.79 GENETIC SCREENING: ICD-10-CM

## 2025-04-11 DIAGNOSIS — E89.0 HYPOTHYROIDISM, POSTSURGICAL: ICD-10-CM

## 2025-04-11 DIAGNOSIS — N83.519 OVARIAN TORSION: ICD-10-CM

## 2025-04-11 PROCEDURE — 3075F SYST BP GE 130 - 139MM HG: CPT

## 2025-04-11 PROCEDURE — 3079F DIAST BP 80-89 MM HG: CPT

## 2025-04-11 PROCEDURE — 99203 OFFICE O/P NEW LOW 30 MIN: CPT

## 2025-04-11 RX ORDER — LEVOTHYROXINE SODIUM 137 UG/1
137 TABLET ORAL
Qty: 90 TABLET | Refills: 3 | Status: SHIPPED | OUTPATIENT
Start: 2025-05-01

## 2025-04-11 RX ORDER — MINOCYCLINE HYDROCHLORIDE 100 MG/1
100 CAPSULE ORAL 2 TIMES DAILY
Qty: 180 CAPSULE | Refills: 1 | Status: SHIPPED | OUTPATIENT
Start: 2025-04-11

## 2025-04-11 ASSESSMENT — ENCOUNTER SYMPTOMS
VOMITING: 0
SHORTNESS OF BREATH: 0
BLOOD IN STOOL: 0
ABDOMINAL PAIN: 0
HEADACHES: 0
BACK PAIN: 1
CHILLS: 0
FEVER: 0
CONSTIPATION: 0
DIARRHEA: 1
BLURRED VISION: 0
NAUSEA: 0

## 2025-04-11 ASSESSMENT — PATIENT HEALTH QUESTIONNAIRE - PHQ9: CLINICAL INTERPRETATION OF PHQ2 SCORE: 0

## 2025-04-11 NOTE — PROGRESS NOTES
"This note is formatted in an APSO format, for additional subjective and objective evaluation please scroll to the bottom of the note.    New Patient Visit    CC:    Chief Complaint   Patient presents with    Establish Care       Assessment & Plan:   44 y.o. female with the following -    Problem List Items Addressed This Visit       Acne vulgaris    Relevant Medications    minocycline (MINOCIN) 100 MG Cap    Hypothyroidism, postsurgical    Relevant Medications    levothyroxine (SYNTHROID) 137 MCG Tab (Start on 2025)    Ulcerative colitis (HCC)    Relevant Orders    Referral to Gastroenterology     Other Visit Diagnoses         Encounter for screening mammogram for breast cancer        Relevant Orders    MA-SCREENING MAMMO BILAT W/TOMOSYNTHESIS W/CAD      Ovarian torsion        Relevant Orders    Referral to OB/Gyn      Obesity (BMI 30-39.9)        Relevant Orders    Comp Metabolic Panel    Lipid Profile    CBC WITH DIFFERENTIAL    TSH WITH REFLEX TO FT4    Referral to Genetic Research Studies      Genetic screening        Relevant Orders    Referral to Genetic Research Studies            Subjective:     HISTORY OF THE PRESENT ILLNESS: Patient is a 44 y.o. female, here today to establish care.     No problems updated.    PMHx - Specialists:  Ulcerative Colitis - colectomy with interval re-anastamosis  DM2 - resolved  Adrenal insufficiency - used to see an endocrinology  Thyroid masses - thyroidectomy ( or 2018)  Migraine hx - exercises for this, has not had one in a \"long time\"  Back pain - controlled with exericse    PSurgHx:  Ovarian torsion ()  Colectomy ()  Re-anastamosis ()  Thyroidectomy ( or 2018)  Cholecystectomy     FHx:  Cancer: Ovarian cancer in maternal grandmother (passed away at 54)   Heart disease: mgpa heart disease in his 40's    SocHx:  Smokin pack-year hx, quit 2024  Vaping: Non-vaper  Alcohol: Rare/None  Marijuana: None  Drug Use: Never  Hobbies: Hiking, walking, " "gambling  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  Lives at home with her , 2 dogs, bearzohra gutiérrezon, 2 kids (16 and 18)    Diet:    FF 2x/week, doesn't handle vegetables well  Exercise:   Hiking/walking dogs  --Immunizations:   Immunization History   Administered Date(s) Administered    INFLUENZA TIV (IM) 02/24/2014    Influenza Seasonal Injectable - Historical Data 02/24/2014    Influenza Vaccine Quad Inj (Pf) 11/01/2018, 10/06/2020    Influenza Vaccine Quad Inj (Preserved) 12/19/2014    PFIZER PURPLE CAP SARS-COV-2 VACCINATION (12+) 02/18/2021, 03/11/2021, 11/28/2021       ROS:   Review of Systems   Constitutional:  Negative for chills and fever.   HENT:  Negative for hearing loss.    Eyes:  Negative for blurred vision.   Respiratory:  Negative for shortness of breath.    Cardiovascular:  Negative for chest pain.   Gastrointestinal:  Positive for diarrhea. Negative for abdominal pain, blood in stool, constipation, melena, nausea and vomiting.   Genitourinary:  Negative for dysuria.   Musculoskeletal:  Positive for back pain (chronic).   Neurological:  Negative for headaches.         Objective:     Exam: /80 (BP Location: Left arm, Patient Position: Sitting, BP Cuff Size: Adult)   Pulse 75   Temp 37.3 °C (99.1 °F)   Ht 1.6 m (5' 3\")   Wt 78.7 kg (173 lb 8 oz)   SpO2 98%  Body mass index is 30.73 kg/m².    Physical Exam  Constitutional:       Appearance: Normal appearance.   HENT:      Head: Normocephalic and atraumatic.      Right Ear: Tympanic membrane normal.      Left Ear: Tympanic membrane normal.      Nose: Nose normal.      Mouth/Throat:      Mouth: Mucous membranes are moist.      Pharynx: No oropharyngeal exudate.   Eyes:      General: No scleral icterus.     Conjunctiva/sclera: Conjunctivae normal.      Pupils: Pupils are equal, round, and reactive to light.   Cardiovascular:      Rate and Rhythm: Normal rate and regular rhythm.      Pulses: Normal pulses. "      Heart sounds: Normal heart sounds. No murmur heard.  Pulmonary:      Effort: Pulmonary effort is normal. No respiratory distress.      Breath sounds: Normal breath sounds.   Abdominal:      General: There is no distension.      Palpations: Abdomen is soft.      Tenderness: There is no abdominal tenderness. There is no guarding.   Musculoskeletal:         General: Normal range of motion.      Cervical back: Normal range of motion and neck supple.   Skin:     General: Skin is warm and dry.      Capillary Refill: Capillary refill takes less than 2 seconds.   Neurological:      General: No focal deficit present.      Mental Status: She is alert and oriented to person, place, and time.         Return in about 2 months (around 6/11/2025) for FU Labs.

## 2025-04-16 NOTE — Clinical Note
REFERRAL APPROVAL NOTICE         Sent on April 16, 2025                   Gala Camarena  [address]   [place] [address]                   Dear MsTru Camarena,    After a careful review of the medical information and benefit coverage, [facility] has processed your referral. See below for additional details.    If applicable, you must be actively enrolled with your insurance for coverage of the authorized service. If you have any questions regarding your coverage, please contact your insurance directly.    REFERRAL INFORMATION   Referral #:  [id number]  Referred-To Provider    Referred-By Provider:  Gastroenterology    Bam Cheung M.D.   [facility]      [address] [place] [address]  [phone] [address] [place] [id number] [phone]    Referral Start Date:  04/11/2025  Referral End Date:   04/11/2026             SCHEDULING  If you do not already have an appointment, please call [phone] to make an appointment.     MORE INFORMATION  If you do not already have a [employer] account, sign up at: [email or web address]  You can access your medical information, make appointments, see lab results, billing information, and more.  If you have questions regarding this referral, please contact  the [facility] Referrals department at:             [phone]. Monday - Friday 8:00AM - 5:00PM.     Sincerely,    [facility]

## 2025-04-16 NOTE — Clinical Note
REFERRAL APPROVAL NOTICE         Sent on April 16, 2025                   Gala Camarena  [address]   [place] [address]                   Dear MsTru Camarena,    After a careful review of the medical information and benefit coverage, [facility] has processed your referral. See below for additional details.    If applicable, you must be actively enrolled with your insurance for coverage of the authorized service. If you have any questions regarding your coverage, please contact your insurance directly.    REFERRAL INFORMATION   Referral #:  [id number]  Referred-To Department    Referred-By Provider:  OB/Gyn    Bam Cheung M.D.   [facility]      [address]  [facility] [address]  [phone] [address], [address]  [facility] [id number] [phone]    Referral Start Date:  04/11/2025  Referral End Date:   04/11/2026             SCHEDULING  If you do not already have an appointment, please call [phone] to make an appointment.     MORE INFORMATION  If you do not already have a [employer] account, sign up at: [email or web address]  You can access your medical information, make appointments, see lab results, billing information, and more.  If you have questions regarding this referral, please contact  the [facility] Referrals department at:             [phone]. Monday - Friday 8:00AM - 5:00PM.     Sincerely,    [facility]

## 2025-04-23 ENCOUNTER — HOSPITAL ENCOUNTER (OUTPATIENT)
Dept: RADIOLOGY | Facility: MEDICAL CENTER | Age: 45
End: 2025-04-23
Payer: COMMERCIAL

## 2025-04-23 ENCOUNTER — TELEPHONE (OUTPATIENT)
Dept: HEALTH INFORMATION MANAGEMENT | Facility: OTHER | Age: 45
End: 2025-04-23
Payer: COMMERCIAL

## 2025-04-23 ENCOUNTER — RESULTS FOLLOW-UP (OUTPATIENT)
Dept: MEDICAL GROUP | Facility: CLINIC | Age: 45
End: 2025-04-23

## 2025-04-23 DIAGNOSIS — Z12.31 ENCOUNTER FOR SCREENING MAMMOGRAM FOR BREAST CANCER: ICD-10-CM

## 2025-04-23 PROCEDURE — 77067 SCR MAMMO BI INCL CAD: CPT

## 2025-05-01 ENCOUNTER — RESEARCH ENCOUNTER (OUTPATIENT)
Dept: RESEARCH | Facility: MEDICAL CENTER | Age: 45
End: 2025-05-01
Payer: COMMERCIAL

## 2025-05-01 DIAGNOSIS — Z00.6 RESEARCH STUDY PATIENT: ICD-10-CM

## 2025-05-01 NOTE — RESEARCH NOTE
Patient has been referred by Bam Cheung M.D.. The initial referral follow-up message, which includes the consent form link, has been sent to the patient.    Eligible Studies: HNP/MNASH

## 2025-06-23 ENCOUNTER — APPOINTMENT (OUTPATIENT)
Dept: MEDICAL GROUP | Facility: CLINIC | Age: 45
End: 2025-06-23
Payer: COMMERCIAL

## (undated) DEVICE — SUTURE 3-0 VICRYL PLUS SH - 8X 18 INCH (12/BX)

## (undated) DEVICE — FIBRILLAR SURGICEL 4X4 - 10/CA

## (undated) DEVICE — CATHETER IV 20 GA X 1-1/4 ---SURG.& SDS ONLY--- (50EA/BX)

## (undated) DEVICE — PACK MINOR BASIN - (2EA/CA)

## (undated) DEVICE — SUTURE 3-0 VICRYL PLUS SH - 27 INCH (36/BX)

## (undated) DEVICE — SUTURE 2-0 SILK 12 X 18" (36PK/BX)"

## (undated) DEVICE — WATER IRRIGATION STERILE 1000ML (12EA/CA)

## (undated) DEVICE — CANISTER SUCTION RIGID RED 1500CC (40EA/CA)

## (undated) DEVICE — KIT ANESTHESIA W/CIRCUIT & 3/LT BAG W/FILTER (20EA/CA)

## (undated) DEVICE — GLOVE BIOGEL INDICATOR SZ 7SURGICAL PF LTX - (50/BX 4BX/CA)

## (undated) DEVICE — PROTECTOR ULNA NERVE - (36PR/CA)

## (undated) DEVICE — SUTURE 3-0 VICRYL PLUS RB-1 - 8 X 18 INCH (12/BX)

## (undated) DEVICE — NEPTUNE 4 PORT MANIFOLD - (20/PK)

## (undated) DEVICE — GLOVE BIOGEL PI INDICATOR SZ 7.5 SURGICAL PF LF -(50/BX 4BX/CA)

## (undated) DEVICE — CLIP MED INTNL HRZN TI ESCP - (25/BX)

## (undated) DEVICE — CLIP SM INTNL HRZN TI ESCP LGT - (24EA/PK 25PK/BX)

## (undated) DEVICE — TUBING CLEARLINK DUO-VENT - C-FLO (48EA/CA)

## (undated) DEVICE — GOWN SURGEONS LARGE - (32/CA)

## (undated) DEVICE — SYRINGE SAFETY 3 ML 18 GA X 1 1/2 BLUNT LL (100/BX 8BX/CA)

## (undated) DEVICE — SENSOR SPO2 NEO LNCS ADHESIVE (20/BX) SEE USER NOTES

## (undated) DEVICE — SHEAR HS FOCUS 9CM CVD - (6/BX)

## (undated) DEVICE — KIT  I.V. START (100EA/CA)

## (undated) DEVICE — TRAY SKIN SCRUB PVP WET (20EA/CA) PART #DYND70356 DISCONTINUED

## (undated) DEVICE — SUCTION INSTRUMENT YANKAUER BULBOUS TIP W/O VENT (50EA/CA)

## (undated) DEVICE — MASK ANESTHESIA ADULT  - (100/CA)

## (undated) DEVICE — GLOVE BIOGEL SZ 7 SURGICAL PF LTX - (50PR/BX 4BX/CA)

## (undated) DEVICE — GLOVE BIOGEL PI INDICATOR SZ 7.0 SURGICAL PF LF - (50/BX 4BX/CA)

## (undated) DEVICE — HEAD HOLDER JUNIOR/ADULT

## (undated) DEVICE — TUBE CONNECTING SUCTION - CLEAR PLASTIC STERILE 72 IN (50EA/CA)

## (undated) DEVICE — SUTURE GENERAL

## (undated) DEVICE — BOVIE BLADE COATED &INSULATED (50EA/PK)

## (undated) DEVICE — SLEEVE, VASO, THIGH, MED

## (undated) DEVICE — SPONGE PEANUT - (5/PK 50PK/CA)

## (undated) DEVICE — GLOVE SZ 6.5 BIOGEL PI MICRO - PF LF (50PR/BX)

## (undated) DEVICE — SPONGE XRAY 8X4 STERL. 12PL - (10EA/TY 80TY/CA)

## (undated) DEVICE — DERMABOND ADVANCED - (12EA/BX)

## (undated) DEVICE — DRAPE STRLE REG TOWEL 18X24 - (10/BX 4BX/CA)"

## (undated) DEVICE — SODIUM CHL IRRIGATION 0.9% 1000ML (12EA/CA)

## (undated) DEVICE — ELECTRODE DUAL RETURN W/ CORD - (50/PK)

## (undated) DEVICE — SUT NABSB 4-0 P-3 18IN PRLN - (12/BX)

## (undated) DEVICE — CANISTER SUCTION 3000ML MECHANICAL FILTER AUTO SHUTOFF MEDI-VAC NONSTERILE LF DISP  (40EA/CA)

## (undated) DEVICE — LACTATED RINGERS INJ 1000 ML - (14EA/CA 60CA/PF)

## (undated) DEVICE — ELECTRODE 850 FOAM ADHESIVE - HYDROGEL RADIOTRNSPRNT (50/PK)

## (undated) DEVICE — GOWN WARMING STANDARD FLEX - (30/CA)

## (undated) DEVICE — TUBE EMG NIM TRIVANTAGE 6MM (3EA/PK)

## (undated) DEVICE — BLANKET WARMING LOWER BODY - (10/CA) INACTIVE USE #8585

## (undated) DEVICE — SET LEADWIRE 5 LEAD BEDSIDE DISPOSABLE ECG (1SET OF 5/EA)

## (undated) DEVICE — DRAPE MAGNETIC (INSTRA-MAG) - (30/CA)

## (undated) DEVICE — PROBE PRASS STAND STIMULATING (5EA/PK)

## (undated) DEVICE — DRAPE SURGICAL U 77X120 - (10/CA)